# Patient Record
Sex: MALE | Race: WHITE | Employment: FULL TIME | URBAN - METROPOLITAN AREA
[De-identification: names, ages, dates, MRNs, and addresses within clinical notes are randomized per-mention and may not be internally consistent; named-entity substitution may affect disease eponyms.]

---

## 2017-12-08 PROBLEM — G72.0 STATIN MYOPATHY: Status: ACTIVE | Noted: 2017-12-08

## 2017-12-08 PROBLEM — T46.6X5A STATIN MYOPATHY: Status: ACTIVE | Noted: 2017-12-08

## 2018-03-22 RX ORDER — CEFAZOLIN SODIUM/WATER 2 G/20 ML
2 SYRINGE (ML) INTRAVENOUS ONCE
Status: CANCELLED | OUTPATIENT
Start: 2018-03-22 | End: 2018-03-22

## 2018-03-27 ENCOUNTER — HOSPITAL ENCOUNTER (OUTPATIENT)
Dept: SURGERY | Age: 58
Discharge: HOME | End: 2018-03-27
Attending: ORTHOPAEDIC SURGERY
Payer: COMMERCIAL

## 2018-03-27 ENCOUNTER — HOSPITAL ENCOUNTER (OUTPATIENT)
Dept: PHYSICAL THERAPY | Age: 58
Discharge: HOME | End: 2018-03-27
Payer: COMMERCIAL

## 2018-03-27 LAB
ALBUMIN SERPL-MCNC: 3.8 G/DL (ref 3.5–5)
ANION GAP SERPL CALC-SCNC: 12 MMOL/L (ref 7–16)
APPEARANCE UR: CLEAR
APTT PPP: 36.9 SEC (ref 23.2–35.3)
ATRIAL RATE: 61 BPM
BACTERIA SPEC CULT: NORMAL
BASOPHILS # BLD: 0.1 K/UL (ref 0–0.2)
BASOPHILS NFR BLD: 1 % (ref 0–2)
BILIRUB UR QL: NEGATIVE
BUN SERPL-MCNC: 19 MG/DL (ref 6–23)
CALCIUM SERPL-MCNC: 8.8 MG/DL (ref 8.3–10.4)
CALCULATED P AXIS, ECG09: 57 DEGREES
CALCULATED R AXIS, ECG10: 59 DEGREES
CALCULATED T AXIS, ECG11: 55 DEGREES
CHLORIDE SERPL-SCNC: 105 MMOL/L (ref 98–107)
CO2 SERPL-SCNC: 23 MMOL/L (ref 21–32)
COLOR UR: YELLOW
CREAT SERPL-MCNC: 1.04 MG/DL (ref 0.8–1.5)
DIAGNOSIS, 93000: NORMAL
DIFFERENTIAL METHOD BLD: ABNORMAL
EOSINOPHIL # BLD: 0.3 K/UL (ref 0–0.8)
EOSINOPHIL NFR BLD: 5 % (ref 0.5–7.8)
ERYTHROCYTE [DISTWIDTH] IN BLOOD BY AUTOMATED COUNT: 14.5 % (ref 11.9–14.6)
EST. AVERAGE GLUCOSE BLD GHB EST-MCNC: 111 MG/DL
GLUCOSE SERPL-MCNC: 180 MG/DL (ref 65–100)
GLUCOSE UR STRIP.AUTO-MCNC: NEGATIVE MG/DL
HBA1C MFR BLD: 5.5 % (ref 4.8–6)
HCT VFR BLD AUTO: 48 % (ref 41.1–50.3)
HGB BLD-MCNC: 16.2 G/DL (ref 13.6–17.2)
HGB UR QL STRIP: NEGATIVE
IMM GRANULOCYTES # BLD: 0 K/UL (ref 0–0.5)
IMM GRANULOCYTES NFR BLD AUTO: 0 % (ref 0–5)
INR PPP: 1
KETONES UR QL STRIP.AUTO: NEGATIVE MG/DL
LEUKOCYTE ESTERASE UR QL STRIP.AUTO: NEGATIVE
LYMPHOCYTES # BLD: 1.3 K/UL (ref 0.5–4.6)
LYMPHOCYTES NFR BLD: 20 % (ref 13–44)
MCH RBC QN AUTO: 31.7 PG (ref 26.1–32.9)
MCHC RBC AUTO-ENTMCNC: 33.8 G/DL (ref 31.4–35)
MCV RBC AUTO: 93.9 FL (ref 79.6–97.8)
MONOCYTES # BLD: 0.3 K/UL (ref 0.1–1.3)
MONOCYTES NFR BLD: 5 % (ref 4–12)
NEUTS SEG # BLD: 4.3 K/UL (ref 1.7–8.2)
NEUTS SEG NFR BLD: 69 % (ref 43–78)
NITRITE UR QL STRIP.AUTO: NEGATIVE
P-R INTERVAL, ECG05: 162 MS
PH UR STRIP: 5.5 [PH] (ref 5–9)
PLATELET # BLD AUTO: 198 K/UL (ref 150–450)
PMV BLD AUTO: 10.7 FL (ref 10.8–14.1)
POTASSIUM SERPL-SCNC: 4.1 MMOL/L (ref 3.5–5.1)
PROT UR STRIP-MCNC: NEGATIVE MG/DL
PROTHROMBIN TIME: 12.9 SEC (ref 11.5–14.5)
Q-T INTERVAL, ECG07: 412 MS
QRS DURATION, ECG06: 90 MS
QTC CALCULATION (BEZET), ECG08: 414 MS
RBC # BLD AUTO: 5.11 M/UL (ref 4.23–5.67)
SERVICE CMNT-IMP: NORMAL
SODIUM SERPL-SCNC: 140 MMOL/L (ref 136–145)
SP GR UR REFRACTOMETRY: 1.02 (ref 1–1.02)
UROBILINOGEN UR QL STRIP.AUTO: 0.2 EU/DL (ref 0.2–1)
VENTRICULAR RATE, ECG03: 61 BPM
WBC # BLD AUTO: 6.3 K/UL (ref 4.3–11.1)

## 2018-03-27 PROCEDURE — 80307 DRUG TEST PRSMV CHEM ANLYZR: CPT | Performed by: ORTHOPAEDIC SURGERY

## 2018-03-27 PROCEDURE — 77030027138 HC INCENT SPIROMETER -A

## 2018-03-27 PROCEDURE — 82040 ASSAY OF SERUM ALBUMIN: CPT | Performed by: ORTHOPAEDIC SURGERY

## 2018-03-27 PROCEDURE — 87641 MR-STAPH DNA AMP PROBE: CPT | Performed by: ORTHOPAEDIC SURGERY

## 2018-03-27 PROCEDURE — 93005 ELECTROCARDIOGRAM TRACING: CPT | Performed by: ORTHOPAEDIC SURGERY

## 2018-03-27 PROCEDURE — 81003 URINALYSIS AUTO W/O SCOPE: CPT | Performed by: ORTHOPAEDIC SURGERY

## 2018-03-27 PROCEDURE — 36415 COLL VENOUS BLD VENIPUNCTURE: CPT | Performed by: ORTHOPAEDIC SURGERY

## 2018-03-27 PROCEDURE — 85730 THROMBOPLASTIN TIME PARTIAL: CPT | Performed by: ORTHOPAEDIC SURGERY

## 2018-03-27 PROCEDURE — 85025 COMPLETE CBC W/AUTO DIFF WBC: CPT | Performed by: ORTHOPAEDIC SURGERY

## 2018-03-27 PROCEDURE — 97161 PT EVAL LOW COMPLEX 20 MIN: CPT

## 2018-03-27 PROCEDURE — 80048 BASIC METABOLIC PNL TOTAL CA: CPT | Performed by: ORTHOPAEDIC SURGERY

## 2018-03-27 PROCEDURE — 83036 HEMOGLOBIN GLYCOSYLATED A1C: CPT | Performed by: ORTHOPAEDIC SURGERY

## 2018-03-27 PROCEDURE — 85610 PROTHROMBIN TIME: CPT | Performed by: ORTHOPAEDIC SURGERY

## 2018-03-27 NOTE — PERIOP NOTES
Patient verified name, , and surgery as listed in [facility]. Type 3 surgery, PAT joint assessment complete. Labs per surgeon: cbc, bmp, UA, PT, PTT, albumin, nicotine, HgbA1C, MRSA nasal swab; results pending. Labs per anesthesia protocol: no additional labs needed. EKG:Done today- within anesthesia guidelines. Requested Echo report and carotid US report to be faxed to [phone] from Dr. Maggi Mckinley office. Hibiclens and instructions to return bottle on DOS given per hospital policy. Nasal Swab collected per MD order and instructions for Mupirocin nasal ointment if required. Patient provided with handouts including Guide to Surgery, Pain Management, Hand Hygiene, Blood Transfusion Education, and [place] [facility] Brochure. Patient answered medical/surgical history questions at their best of ability. All prior to admission medications documented in [facility]. Original medication prescription bottle not visualized during patient appointment. Patient instructed to hold all vitamins 7 days prior to surgery and NSAIDS 5 days prior to surgery. Medications to be held: mobic. Patient instructed to continue previous medications as prescribed prior to surgery and to take the following medications the day of surgery according to anesthesia guidelines with a small sip of water: atenolol, crestor and norvasc. Patient teach back successful and patient demonstrates knowledge of instruction.

## 2018-03-27 NOTE — PERIOP NOTES
PTT elevated and glucose 180- pt is not a known diabetic. HgbA1C is normal. Will have anesthesia review abnormal labs results. All other lab results within anesthesia guidelines. Lab results sent to PCP per surgeon's request.     Recent Results (from the past 12 hour(s))   CBC WITH AUTOMATED DIFF    Collection Time: 03/27/18  7:40 AM   Result Value Ref Range    WBC 6.3 4.3 - 11.1 K/uL    RBC 5.11 4.23 - 5.67 M/uL    HGB 16.2 13.6 - 17.2 g/dL    HCT 48.0 41.1 - 50.3 %    MCV 93.9 79.6 - 97.8 FL    MCH 31.7 26.1 - 32.9 PG    MCHC 33.8 31.4 - 35.0 g/dL    RDW 14.5 11.9 - 14.6 %    PLATELET 093 414 - 979 K/uL    MPV 10.7 (L) 10.8 - 14.1 FL    DF AUTOMATED      NEUTROPHILS 69 43 - 78 %    LYMPHOCYTES 20 13 - 44 %    MONOCYTES 5 4.0 - 12.0 %    EOSINOPHILS 5 0.5 - 7.8 %    BASOPHILS 1 0.0 - 2.0 %    IMMATURE GRANULOCYTES 0 0.0 - 5.0 %    ABS. NEUTROPHILS 4.3 1.7 - 8.2 K/UL    ABS. LYMPHOCYTES 1.3 0.5 - 4.6 K/UL    ABS. MONOCYTES 0.3 0.1 - 1.3 K/UL    ABS. EOSINOPHILS 0.3 0.0 - 0.8 K/UL    ABS. BASOPHILS 0.1 0.0 - 0.2 K/UL    ABS. IMM.  GRANS. 0.0 0.0 - 0.5 K/UL   PROTHROMBIN TIME + INR    Collection Time: 03/27/18  7:40 AM   Result Value Ref Range    Prothrombin time 12.9 11.5 - 14.5 sec    INR 1.0     PTT    Collection Time: 03/27/18  7:40 AM   Result Value Ref Range    aPTT 36.9 (H) 23.2 - 29.4 SEC   METABOLIC PANEL, BASIC    Collection Time: 03/27/18  7:40 AM   Result Value Ref Range    Sodium 140 136 - 145 mmol/L    Potassium 4.1 3.5 - 5.1 mmol/L    Chloride 105 98 - 107 mmol/L    CO2 23 21 - 32 mmol/L    Anion gap 12 7 - 16 mmol/L    Glucose 180 (H) 65 - 100 mg/dL    BUN 19 6 - 23 MG/DL    Creatinine 1.04 0.8 - 1.5 MG/DL    GFR est AA >60 >60 ml/min/1.73m2    GFR est non-AA >60 >60 ml/min/1.73m2    Calcium 8.8 8.3 - 10.4 MG/DL   URINALYSIS W/ RFLX MICROSCOPIC    Collection Time: 03/27/18  7:40 AM   Result Value Ref Range    Color YELLOW      Appearance CLEAR      Specific gravity 1.020 1.001 - 1.023      pH (UA) 5.5 5.0 - 9.0      Protein NEGATIVE  NEG mg/dL    Glucose NEGATIVE  mg/dL    Ketone NEGATIVE  NEG mg/dL    Bilirubin NEGATIVE  NEG      Blood NEGATIVE  NEG      Urobilinogen 0.2 0.2 - 1.0 EU/dL    Nitrites NEGATIVE  NEG      Leukocyte Esterase NEGATIVE  NEG     ALBUMIN    Collection Time: 03/27/18  7:40 AM   Result Value Ref Range    Albumin 3.8 3.5 - 5.0 g/dL   HEMOGLOBIN A1C WITH EAG    Collection Time: 03/27/18  7:40 AM   Result Value Ref Range    Hemoglobin A1c 5.5 4.8 - 6.0 %    Est. average glucose 111 mg/dL   EKG, 12 LEAD, INITIAL    Collection Time: 03/27/18  8:35 AM   Result Value Ref Range    Ventricular Rate 61 BPM    Atrial Rate 61 BPM    P-R Interval 162 ms    QRS Duration 90 ms    Q-T Interval 412 ms    QTC Calculation (Bezet) 414 ms    Calculated P Axis 57 degrees    Calculated R Axis 59 degrees    Calculated T Axis 55 degrees    Diagnosis       Normal sinus rhythm  Normal ECG  When compared with ECG of 16-SEP-2002 20:43,  QT has shortened  Confirmed by Rosy Finney MD (), COTY RODRIGUES ([id number]) on 3/27/2018 10:21:13 AM

## 2018-03-27 NOTE — PERIOP NOTES
Received recent echo report and carotid US report- both placed on chart for anesthesia reference if needed.

## 2018-03-27 NOTE — PROGRESS NOTES
Miky Bhtaia  : (29 y.o.) [facility] at [address], [place] [address].  [phone]       Physical Therapy Prehab Plan of Treatment and Evaluation Summary:3/27/2018    ICD-10: Treatment Diagnosis:   · Pain in Right Knee (M25.561)  · Stiffness of Right Knee, Not elsewhere classified (M25.661)  Precautions/Allergies:   Ambien [zolpidem]; Celebrex [celecoxib]; Rozerem [ramelteon]; and Sulfa (sulfonamide antibiotics)  MEDICAL/REFERRING DIAGNOSIS:  Unilateral primary osteoarthritis, right knee [M17.11]  REFERRING PHYSICIAN: India Odell MD  DATE OF SURGERY: 3/26/18   Assessment:   Comments:  He is motivated and prepared for surgery. He [place] going home at [place] with his son's support     PROBLEM LIST (Impacting functional limitations):  Mr. Bhatia presents with the following right lower extremity(s) problems:  1. Strength  2. Range of Motion  3. Home Exercise Program  4. Pain   INTERVENTIONS PLANNED:  1. Home Exercise Program  2. Educational Discussion     TREATMENT PLAN: Effective Dates: 3/27/2018 TO 3/27/2018. Frequency/Duration: Patient to continue to perform home exercise program at least twice per day up until his surgery. GOALS: (Goals have been discussed and agreed upon with patient.)  Discharge Goals: Time Frame: 1 Day  1. Patient will demonstrate independence with a home exercise program designed to increase strength, range of motion and pain control to minimize functional deficits and optimize patient for total joint replacement. Rehabilitation Potential For Stated Goals: Good  Regarding Miky Balwinder Powell's therapy, I certify that the treatment plan above will be carried out by a therapist or under their direction.   Thank you for this referral,  Robin Martinze, PT               HISTORY:   Present Symptoms:  Pain Intensity 1: 8 (at its worst)  Pain Location 1: Knee  Pain Orientation 1: Anterior, Right, Medial   History of Present Injury/Illness (Reason for Referral):  Medical/Referring Diagnosis: Unilateral primary osteoarthritis, right knee [M17.11]   Past Medical History/Comorbidities:   Mr. Swetha Antoine  has a past medical history of Alcohol dependence, binge pattern ([facility] [address] (9/16/2002); Back pain (4/13/10); CAD (coronary artery disease) (4/30/10); Chest pain (12/16/13); Cocaine abuse (1995); Degenerative arthritis of knee (9/10/09); Frontal sinusitis (6/29/10); Hyperlipidemia (3/28/14); Knee effusion (6/23/09); Knee pain (6/23/09); Tobacco use (6/29/10); Unspecified essential hypertension (4/11/14); Marquette Glance (04/11/14); and Viral warts, unspecified (4/18/14). Mr. Swetha Antoine  has a past surgical history that includes hx appendectomy (1982) and hx heent. Social History/Living Environment:   Home Environment: Private residence  # Steps to Enter: 3  Rails to Enter: No  One/Two Story Residence: One story  Living Alone: Yes  Support Systems: Child(anila)  Patient Expects to be Discharged to[de-identified] Private residence  Current DME Used/Available at Home: Sparkle Edwards, [address] [facility], [address] [facility] [address] chair  Tub or Shower Type: Shower  Work/Activity:  Works as a . Is on his feet for 10 hrs.   Dominant Side:  RIGHT  Current Medications:  See Pre-assessment nursing note   Number of Personal Factors/Comorbidities that affect the Plan of Care: 1-2: MODERATE COMPLEXITY   EXAMINATION:   ADLs (Current Functional Status):   Ambulation:  [x] Independent  [] Walk Indoors Only  [x] Walk Outdoors  [] Use Assistive Device  [] Use Wheelchair Only Dressing:  [x] Independent  Requires Assistance from Someone for:  [] Sock/Shoes  [] Pants  [] Everything   Bathing/Showering:   [x] Independent  [] Requires Assistance from Someone  [] Sponge Bath Only Household Activities:  [] Routine house and yard work  [] Light Housework Only  [x] None   Observation/Orthostatic Postural Assessment:    Genu varus right, Genu varus left, Leg length discrepancy, right (R LE 1/2\" shorter than L)  ROM/Flexibility:   AROM: Within functional limits (L LE)                       RLE AROM  R Knee Flexion: 85  R Knee Extension: 15   Strength:   Strength: Within functional limits (L LE)              RLE Strength  R Hip Flexion: 3+  R Knee Extension: 3+  R Ankle Dorsiflexion: 4   Functional Mobility:    Sensation: Intact (L LE)    Stand to Sit: Independent  Sit to Stand: Independent  Stand Pivot Transfers: Independent  Distance (ft): 1000 Feet (ft)  Ambulation - Level of Assistance: Independent  Speed/Mckenzie: Pace decreased (<100 feet/min)  Gait Abnormalities: Antalgic          Balance:    Sitting: Intact  Standing: Intact   Body Structures Involved:  1. Bones  2. Joints  3. Muscles Body Functions Affected:  1. Neuromusculoskeletal  2. Movement Related Activities and Participation Affected:  1. Mobility   Number of elements that affect the Plan of Care: 4+: HIGH COMPLEXITY   CLINICAL PRESENTATION:   Presentation: Stable and uncomplicated: LOW COMPLEXITY   CLINICAL DECISION MAKING:   Outcome Measure: Tool Used: Lower Extremity Functional Scale (LEFS)  Score:  Initial: 25/80 Most Recent: X/80 (Date: -- )   Interpretation of Score: 20 questions each scored on a 5 point scale with 0 representing \"extreme difficulty or unable to perform\" and 4 representing \"no difficulty\". The lower the score, the greater the functional disability. 80/80 represents no disability. Minimal detectable change is 9 points. Score 80 79-65 64-49 48-33 32-17 16-1 0   Modifier CH CI CJ CK CL CM CN     ? Mobility - Walking and Moving Around:     - CURRENT STATUS: CL - 60%-79% impaired, limited or restricted    - GOAL STATUS: CL - 60%-79% impaired, limited or restricted    - D/C STATUS:  CL - 60%-79% impaired, limited or restricted  Medical Necessity:   · Mr. Bianca Dickson is expected to optimize his lower extremity strength and ROM in preparation for joint replacement surgery.   Reason for Services/Other Comments:  · Achieve baseline assesment of musculoskeletal system, functional mobility and home environment. , educate in PT HEP in preparation for surgery, educate in hospital plan of care. Use of outcome tool(s) and clinical judgement create a POC that gives a: Clear prediction of patient's progress: LOW COMPLEXITY   TREATMENT:   Treatment/Session Assessment:  Patient was instructed in PT- HEP to increase strength and ROM in LEs. Answered all questions. · Post session pain:  2  · Compliance with Program/Exercises: compliant all of the time.   Total Treatment Duration:  PT Patient Time In/Time Out  Time In: 0715  Time Out: [address]10 [facility]
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
## 2018-03-28 VITALS
TEMPERATURE: 96.6 F | BODY MASS INDEX: 31.82 KG/M2 | WEIGHT: 247.9 LBS | HEIGHT: 74 IN | HEART RATE: 65 BPM | DIASTOLIC BLOOD PRESSURE: 82 MMHG | SYSTOLIC BLOOD PRESSURE: 151 MMHG | RESPIRATION RATE: 16 BRPM | OXYGEN SATURATION: 97 %

## 2018-03-28 LAB
COTININE UR QL SCN: NEGATIVE NG/ML
DRUG SCREEN COMMENT:, 753798: NORMAL

## 2018-03-29 NOTE — PROGRESS NOTES
18 0730   Oxygen Therapy   O2 Sat (%) 97 %   Pulse via Oximetry 71 beats per minute   O2 Device Room air   Pre-Treatment   Breath Sounds Bilateral Clear   Pre FEV1 (liters) 3.1 liters   % Predicted 74   Incentive Spirometry Treatment   Actual Volume (ml) 2750 ml     Initial respiratory Assessment completed with pt. Pt was interviewed and evaluated in [facility] prior to surgery. Patient ID:  Liz Munson  [id number]  45 y.o.  1960  Surgeon: Dr. Nik Coles  Date of Surgery: 2018  Procedure: Total Right Knee Arthroplasty  Primary Care Physician: Julius Chen -[phone]  Specialists:                                  Pt instructed in the use of Incentive Spirometry. Pt instructed to bring Incentive Spirometer back on date of surgery & to start using Is upon return to pt room. Pt taught proper cough technique    History of smokin/2 PPD FOR 40 YEARS                                   FORMER                          Quit date: 2015           Secondhand smoke:[employer]      Past procedures with Oxygen desaturation:NONE    Past Medical History:   Diagnosis Date    Alcohol dependence, binge pattern ([address] 2002    stopped 04    Arthritis     Back pain 4/13/10    CAD (coronary artery disease) 2010    Echo done 2018- LVEF > 65%.      Chest pain 13    Cocaine abuse     Degenerative arthritis of knee 9/10/09    Frontal sinusitis 6/29/10    Hyperlipidemia 2014    Controlled with meds     Knee effusion 09    Knee pain 09    Tobacco use 6/29/10    Unspecified essential hypertension 2014    Controlled with meds     Verruga peruana 14    Viral warts, unspecified 14    filliform/plantar/common/vir              HX OF COCAINE ABUSE AND ALCOHOL DEPENDENCY Respiratory history                                  SOB  ON EXERTION                                    Respiratory meds:                                         FAMILY PRESENT:                                                            NO                                        PAST SLEEP STUDY:                       NO  HX OF TELMA:                                       NO                                     TELMA assessment:                                               SLEEPS ON SIDE                                                               PHYSICAL EXAM   Body mass index is 31.83 kg/(m^2). Visit Vitals    /82 (BP 1 Location: Right arm, BP Patient Position: Sitting; At rest)    Pulse 65    Temp 96.6 °F (35.9 °C)    Resp 16    Ht 6' 2\" (1.88 m)    Wt 112.4 kg (247 lb 14.4 oz)    SpO2 97%    BMI 31.83 kg/m2     Neck circumference: 45     cm    Loud snoring:     STATES HE DOESN'T KNOW                             Witnessed apnea or wakening gasping or choking:,             DENIES,                                                                                                  Awakens with headaches:                                                  DENIES    Morning or daytime tiredness/ sleepiness:                    DENIES - NAPS 2 HOURS A T A TIME A COUPLE OF TIMES PER WEEK                                                                                       Dry mouth or sore throat in morning:                                                                                      DENIES    Scott stage:  4    SACS score:24    STOP/BAN                              CPAP:                       NONE                                               CONT SAT HS              Referrals:    Pt. Phone Number:

## 2018-03-30 NOTE — PERIOP NOTES
Nicotine level- Neg    3/28/2018  8:39 AM - Avinash, Lab In [place]   Component Results   Component Value Flag Ref Range Units Status   Cotinine Screen, urine NEGATIVE   Augkgq=329 ng/mL Final   Drug Screen Comment: Comment

## 2018-04-17 ENCOUNTER — ANESTHESIA EVENT (OUTPATIENT)
Dept: SURGERY | Age: 58
DRG: 470 | End: 2018-04-17
Payer: COMMERCIAL

## 2018-04-18 ENCOUNTER — HOSPITAL ENCOUNTER (INPATIENT)
Age: 58
LOS: 2 days | Discharge: HOME HEALTH | DRG: 470 | End: 2018-04-20
Attending: ORTHOPAEDIC SURGERY | Admitting: ORTHOPAEDIC SURGERY
Payer: COMMERCIAL

## 2018-04-18 ENCOUNTER — ANESTHESIA (OUTPATIENT)
Dept: SURGERY | Age: 58
DRG: 470 | End: 2018-04-18
Payer: COMMERCIAL

## 2018-04-18 ENCOUNTER — HOME HEALTH ADMISSION (OUTPATIENT)
Dept: HOME HEALTH SERVICES | Facility: HOME HEALTH | Age: 58
End: 2018-04-18
Payer: COMMERCIAL

## 2018-04-18 DIAGNOSIS — M17.11 PRIMARY OSTEOARTHRITIS OF RIGHT KNEE: Primary | ICD-10-CM

## 2018-04-18 PROBLEM — M17.9 OA (OSTEOARTHRITIS) OF KNEE: Status: ACTIVE | Noted: 2018-04-18

## 2018-04-18 LAB
ABO + RH BLD: NORMAL
BLOOD GROUP ANTIBODIES SERPL: NORMAL
GLUCOSE BLD STRIP.AUTO-MCNC: 104 MG/DL (ref 65–100)
HGB BLD-MCNC: 13.9 G/DL (ref 13.6–17.2)
SPECIMEN EXP DATE BLD: NORMAL

## 2018-04-18 PROCEDURE — 76210000016 HC OR PH I REC 1 TO 1.5 HR: Performed by: ORTHOPAEDIC SURGERY

## 2018-04-18 PROCEDURE — 97161 PT EVAL LOW COMPLEX 20 MIN: CPT

## 2018-04-18 PROCEDURE — 74011000250 HC RX REV CODE- 250

## 2018-04-18 PROCEDURE — 77030012935 HC DRSG AQUACEL BMS -B: Performed by: ORTHOPAEDIC SURGERY

## 2018-04-18 PROCEDURE — 65270000029 HC RM PRIVATE

## 2018-04-18 PROCEDURE — 77030003602 HC NDL NRV BLK BBMI -B: Performed by: ANESTHESIOLOGY

## 2018-04-18 PROCEDURE — 86900 BLOOD TYPING SEROLOGIC ABO: CPT | Performed by: ORTHOPAEDIC SURGERY

## 2018-04-18 PROCEDURE — 76942 ECHO GUIDE FOR BIOPSY: CPT | Performed by: ORTHOPAEDIC SURGERY

## 2018-04-18 PROCEDURE — 76010010054 HC POST OP PAIN BLOCK: Performed by: ORTHOPAEDIC SURGERY

## 2018-04-18 PROCEDURE — 85018 HEMOGLOBIN: CPT | Performed by: ORTHOPAEDIC SURGERY

## 2018-04-18 PROCEDURE — 94760 N-INVAS EAR/PLS OXIMETRY 1: CPT

## 2018-04-18 PROCEDURE — 77030003665 HC NDL SPN BBMI -A: Performed by: ANESTHESIOLOGY

## 2018-04-18 PROCEDURE — 0SRC0JA REPLACEMENT OF RIGHT KNEE JOINT WITH SYNTHETIC SUBSTITUTE, UNCEMENTED, OPEN APPROACH: ICD-10-PCS | Performed by: ORTHOPAEDIC SURGERY

## 2018-04-18 PROCEDURE — 74011250636 HC RX REV CODE- 250/636

## 2018-04-18 PROCEDURE — 74011250636 HC RX REV CODE- 250/636: Performed by: ORTHOPAEDIC SURGERY

## 2018-04-18 PROCEDURE — 77030018836 HC SOL IRR NACL ICUM -A: Performed by: ORTHOPAEDIC SURGERY

## 2018-04-18 PROCEDURE — 82962 GLUCOSE BLOOD TEST: CPT

## 2018-04-18 PROCEDURE — 77030034849: Performed by: ORTHOPAEDIC SURGERY

## 2018-04-18 PROCEDURE — 77030002922 HC SUT FBRWRE ARTH -B: Performed by: ORTHOPAEDIC SURGERY

## 2018-04-18 PROCEDURE — 36415 COLL VENOUS BLD VENIPUNCTURE: CPT | Performed by: ORTHOPAEDIC SURGERY

## 2018-04-18 PROCEDURE — 97165 OT EVAL LOW COMPLEX 30 MIN: CPT

## 2018-04-18 PROCEDURE — 77030007880 HC KT SPN EPDRL BBMI -B: Performed by: ANESTHESIOLOGY

## 2018-04-18 PROCEDURE — 74011250636 HC RX REV CODE- 250/636: Performed by: ANESTHESIOLOGY

## 2018-04-18 PROCEDURE — C1776 JOINT DEVICE (IMPLANTABLE): HCPCS | Performed by: ORTHOPAEDIC SURGERY

## 2018-04-18 PROCEDURE — 77030002933 HC SUT MCRYL J&J -A: Performed by: ORTHOPAEDIC SURGERY

## 2018-04-18 PROCEDURE — 74011000250 HC RX REV CODE- 250: Performed by: ORTHOPAEDIC SURGERY

## 2018-04-18 PROCEDURE — 77030011640 HC PAD GRND REM COVD -A: Performed by: ORTHOPAEDIC SURGERY

## 2018-04-18 PROCEDURE — 74011250637 HC RX REV CODE- 250/637: Performed by: ANESTHESIOLOGY

## 2018-04-18 PROCEDURE — 76010000162 HC OR TIME 1.5 TO 2 HR INTENSV-TIER 1: Performed by: ORTHOPAEDIC SURGERY

## 2018-04-18 PROCEDURE — 74011250637 HC RX REV CODE- 250/637: Performed by: ORTHOPAEDIC SURGERY

## 2018-04-18 PROCEDURE — 76060000034 HC ANESTHESIA 1.5 TO 2 HR: Performed by: ORTHOPAEDIC SURGERY

## 2018-04-18 PROCEDURE — 77030032490 HC SLV COMPR SCD KNE COVD -B

## 2018-04-18 PROCEDURE — 74011000250 HC RX REV CODE- 250: Performed by: ANESTHESIOLOGY

## 2018-04-18 PROCEDURE — 77030013727 HC IRR FAN PULSVC ZIMM -B: Performed by: ORTHOPAEDIC SURGERY

## 2018-04-18 PROCEDURE — 77030027520 HC SEAL BPLR AQMNTYS MEDT -F: Performed by: ORTHOPAEDIC SURGERY

## 2018-04-18 PROCEDURE — 86580 TB INTRADERMAL TEST: CPT | Performed by: ORTHOPAEDIC SURGERY

## 2018-04-18 PROCEDURE — 94762 N-INVAS EAR/PLS OXIMTRY CONT: CPT

## 2018-04-18 PROCEDURE — 77030020782 HC GWN BAIR PAWS FLX 3M -B: Performed by: ANESTHESIOLOGY

## 2018-04-18 PROCEDURE — 77030003666 HC NDL SPINAL BD -A: Performed by: ORTHOPAEDIC SURGERY

## 2018-04-18 PROCEDURE — 77030033067 HC SUT PDO STRATFX SPIR J&J -B: Performed by: ORTHOPAEDIC SURGERY

## 2018-04-18 PROCEDURE — 74011000302 HC RX REV CODE- 302: Performed by: ORTHOPAEDIC SURGERY

## 2018-04-18 DEVICE — PATELLA
Type: IMPLANTABLE DEVICE | Site: KNEE | Status: FUNCTIONAL
Brand: TRIATHLON

## 2018-04-18 DEVICE — TIBIAL COMPONENT
Type: IMPLANTABLE DEVICE | Site: KNEE | Status: FUNCTIONAL
Brand: TRIATHLON

## 2018-04-18 DEVICE — CRUCIATE RETAINING FEMORAL
Type: IMPLANTABLE DEVICE | Site: KNEE | Status: FUNCTIONAL
Brand: TRIATHLON

## 2018-04-18 DEVICE — TIBIAL BEARING INSERT
Type: IMPLANTABLE DEVICE | Site: KNEE | Status: FUNCTIONAL
Brand: TRIATHLON

## 2018-04-18 RX ORDER — CYCLOBENZAPRINE HCL 10 MG
5 TABLET ORAL 3 TIMES DAILY
Status: DISCONTINUED | OUTPATIENT
Start: 2018-04-18 | End: 2018-04-20 | Stop reason: HOSPADM

## 2018-04-18 RX ORDER — ONDANSETRON 8 MG/1
8 TABLET, ORALLY DISINTEGRATING ORAL
Status: DISCONTINUED | OUTPATIENT
Start: 2018-04-18 | End: 2018-04-20 | Stop reason: HOSPADM

## 2018-04-18 RX ORDER — ONDANSETRON 2 MG/ML
INJECTION INTRAMUSCULAR; INTRAVENOUS AS NEEDED
Status: DISCONTINUED | OUTPATIENT
Start: 2018-04-18 | End: 2018-04-18 | Stop reason: HOSPADM

## 2018-04-18 RX ORDER — NALOXONE HYDROCHLORIDE 0.4 MG/ML
.2-.4 INJECTION, SOLUTION INTRAMUSCULAR; INTRAVENOUS; SUBCUTANEOUS
Status: DISCONTINUED | OUTPATIENT
Start: 2018-04-18 | End: 2018-04-20 | Stop reason: HOSPADM

## 2018-04-18 RX ORDER — ATENOLOL 50 MG/1
100 TABLET ORAL DAILY
Status: DISCONTINUED | OUTPATIENT
Start: 2018-04-19 | End: 2018-04-20 | Stop reason: HOSPADM

## 2018-04-18 RX ORDER — ASPIRIN 81 MG/1
81 TABLET ORAL EVERY 12 HOURS
Qty: 60 TAB | Refills: 0 | Status: SHIPPED | OUTPATIENT
Start: 2018-04-18 | End: 2018-12-21

## 2018-04-18 RX ORDER — DEXAMETHASONE SODIUM PHOSPHATE 4 MG/ML
INJECTION, SOLUTION INTRA-ARTICULAR; INTRALESIONAL; INTRAMUSCULAR; INTRAVENOUS; SOFT TISSUE AS NEEDED
Status: DISCONTINUED | OUTPATIENT
Start: 2018-04-18 | End: 2018-04-18 | Stop reason: HOSPADM

## 2018-04-18 RX ORDER — ACETAMINOPHEN 10 MG/ML
1000 INJECTION, SOLUTION INTRAVENOUS ONCE
Status: COMPLETED | OUTPATIENT
Start: 2018-04-18 | End: 2018-04-18

## 2018-04-18 RX ORDER — LIDOCAINE HYDROCHLORIDE 10 MG/ML
0.1 INJECTION INFILTRATION; PERINEURAL AS NEEDED
Status: DISCONTINUED | OUTPATIENT
Start: 2018-04-18 | End: 2018-04-18 | Stop reason: HOSPADM

## 2018-04-18 RX ORDER — SODIUM CHLORIDE, SODIUM LACTATE, POTASSIUM CHLORIDE, CALCIUM CHLORIDE 600; 310; 30; 20 MG/100ML; MG/100ML; MG/100ML; MG/100ML
100 INJECTION, SOLUTION INTRAVENOUS CONTINUOUS
Status: DISCONTINUED | OUTPATIENT
Start: 2018-04-18 | End: 2018-04-18 | Stop reason: HOSPADM

## 2018-04-18 RX ORDER — MIDAZOLAM HYDROCHLORIDE 1 MG/ML
INJECTION, SOLUTION INTRAMUSCULAR; INTRAVENOUS AS NEEDED
Status: DISCONTINUED | OUTPATIENT
Start: 2018-04-18 | End: 2018-04-18 | Stop reason: HOSPADM

## 2018-04-18 RX ORDER — MIDAZOLAM HYDROCHLORIDE 1 MG/ML
2 INJECTION, SOLUTION INTRAMUSCULAR; INTRAVENOUS ONCE
Status: COMPLETED | OUTPATIENT
Start: 2018-04-18 | End: 2018-04-18

## 2018-04-18 RX ORDER — MELOXICAM 7.5 MG/1
7.5 TABLET ORAL DAILY
Status: DISCONTINUED | OUTPATIENT
Start: 2018-04-19 | End: 2018-04-20 | Stop reason: HOSPADM

## 2018-04-18 RX ORDER — GABAPENTIN 100 MG/1
100 CAPSULE ORAL 2 TIMES DAILY
Status: DISCONTINUED | OUTPATIENT
Start: 2018-04-18 | End: 2018-04-20 | Stop reason: HOSPADM

## 2018-04-18 RX ORDER — ASPIRIN 81 MG/1
81 TABLET ORAL EVERY 12 HOURS
Status: DISCONTINUED | OUTPATIENT
Start: 2018-04-18 | End: 2018-04-20 | Stop reason: HOSPADM

## 2018-04-18 RX ORDER — ONDANSETRON 8 MG/1
8 TABLET, ORALLY DISINTEGRATING ORAL
Qty: 60 TAB | Refills: 0 | Status: SHIPPED | OUTPATIENT
Start: 2018-04-18 | End: 2018-06-15

## 2018-04-18 RX ORDER — ROPIVACAINE HYDROCHLORIDE 5 MG/ML
INJECTION, SOLUTION EPIDURAL; INFILTRATION; PERINEURAL AS NEEDED
Status: DISCONTINUED | OUTPATIENT
Start: 2018-04-18 | End: 2018-04-18 | Stop reason: HOSPADM

## 2018-04-18 RX ORDER — ACETAMINOPHEN 500 MG
1000 TABLET ORAL ONCE
Status: DISCONTINUED | OUTPATIENT
Start: 2018-04-18 | End: 2018-04-18 | Stop reason: ALTCHOICE

## 2018-04-18 RX ORDER — KETOROLAC TROMETHAMINE 30 MG/ML
INJECTION, SOLUTION INTRAMUSCULAR; INTRAVENOUS AS NEEDED
Status: DISCONTINUED | OUTPATIENT
Start: 2018-04-18 | End: 2018-04-18 | Stop reason: HOSPADM

## 2018-04-18 RX ORDER — SODIUM CHLORIDE, SODIUM LACTATE, POTASSIUM CHLORIDE, CALCIUM CHLORIDE 600; 310; 30; 20 MG/100ML; MG/100ML; MG/100ML; MG/100ML
100 INJECTION, SOLUTION INTRAVENOUS CONTINUOUS
Status: DISCONTINUED | OUTPATIENT
Start: 2018-04-18 | End: 2018-04-20 | Stop reason: HOSPADM

## 2018-04-18 RX ORDER — OXYCODONE HCL 10 MG/1
10 TABLET, FILM COATED, EXTENDED RELEASE ORAL EVERY 12 HOURS
Status: DISCONTINUED | OUTPATIENT
Start: 2018-04-18 | End: 2018-04-20 | Stop reason: HOSPADM

## 2018-04-18 RX ORDER — FENTANYL CITRATE 50 UG/ML
100 INJECTION, SOLUTION INTRAMUSCULAR; INTRAVENOUS ONCE
Status: COMPLETED | OUTPATIENT
Start: 2018-04-18 | End: 2018-04-18

## 2018-04-18 RX ORDER — PROPOFOL 10 MG/ML
INJECTION, EMULSION INTRAVENOUS
Status: DISCONTINUED | OUTPATIENT
Start: 2018-04-18 | End: 2018-04-18 | Stop reason: HOSPADM

## 2018-04-18 RX ORDER — SODIUM CHLORIDE 9 MG/ML
INJECTION INTRAMUSCULAR; INTRAVENOUS; SUBCUTANEOUS AS NEEDED
Status: DISCONTINUED | OUTPATIENT
Start: 2018-04-18 | End: 2018-04-18 | Stop reason: HOSPADM

## 2018-04-18 RX ORDER — HYDROMORPHONE HYDROCHLORIDE 2 MG/ML
1 INJECTION, SOLUTION INTRAMUSCULAR; INTRAVENOUS; SUBCUTANEOUS
Status: DISCONTINUED | OUTPATIENT
Start: 2018-04-18 | End: 2018-04-20 | Stop reason: HOSPADM

## 2018-04-18 RX ORDER — DIPHENHYDRAMINE HCL 25 MG
25 CAPSULE ORAL
Status: DISCONTINUED | OUTPATIENT
Start: 2018-04-18 | End: 2018-04-20 | Stop reason: HOSPADM

## 2018-04-18 RX ORDER — SODIUM CHLORIDE 9 MG/ML
100 INJECTION, SOLUTION INTRAVENOUS CONTINUOUS
Status: DISPENSED | OUTPATIENT
Start: 2018-04-18 | End: 2018-04-20

## 2018-04-18 RX ORDER — ROSUVASTATIN CALCIUM 5 MG/1
10 TABLET, COATED ORAL
Status: DISCONTINUED | OUTPATIENT
Start: 2018-04-18 | End: 2018-04-20 | Stop reason: HOSPADM

## 2018-04-18 RX ORDER — ATENOLOL 50 MG/1
100 TABLET ORAL ONCE
Status: COMPLETED | OUTPATIENT
Start: 2018-04-18 | End: 2018-04-18

## 2018-04-18 RX ORDER — KETOROLAC TROMETHAMINE 30 MG/ML
15 INJECTION, SOLUTION INTRAMUSCULAR; INTRAVENOUS EVERY 8 HOURS
Status: COMPLETED | OUTPATIENT
Start: 2018-04-18 | End: 2018-04-19

## 2018-04-18 RX ORDER — TRANEXAMIC ACID 650 1/1
1300 TABLET ORAL
Status: COMPLETED | OUTPATIENT
Start: 2018-04-18 | End: 2018-04-18

## 2018-04-18 RX ORDER — NALOXONE HYDROCHLORIDE 0.4 MG/ML
0.04 INJECTION, SOLUTION INTRAMUSCULAR; INTRAVENOUS; SUBCUTANEOUS
Status: DISCONTINUED | OUTPATIENT
Start: 2018-04-18 | End: 2018-04-20 | Stop reason: HOSPADM

## 2018-04-18 RX ORDER — SODIUM CHLORIDE 0.9 % (FLUSH) 0.9 %
5-10 SYRINGE (ML) INJECTION EVERY 8 HOURS
Status: DISCONTINUED | OUTPATIENT
Start: 2018-04-18 | End: 2018-04-20 | Stop reason: HOSPADM

## 2018-04-18 RX ORDER — EPHEDRINE SULFATE 50 MG/ML
INJECTION, SOLUTION INTRAVENOUS AS NEEDED
Status: DISCONTINUED | OUTPATIENT
Start: 2018-04-18 | End: 2018-04-18 | Stop reason: HOSPADM

## 2018-04-18 RX ORDER — GABAPENTIN 100 MG/1
100 CAPSULE ORAL 2 TIMES DAILY
Qty: 60 CAP | Refills: 0 | Status: SHIPPED | OUTPATIENT
Start: 2018-04-18 | End: 2018-12-21

## 2018-04-18 RX ORDER — AMLODIPINE BESYLATE 5 MG/1
5 TABLET ORAL DAILY
Status: DISCONTINUED | OUTPATIENT
Start: 2018-04-19 | End: 2018-04-20 | Stop reason: HOSPADM

## 2018-04-18 RX ORDER — BUPIVACAINE HYDROCHLORIDE 7.5 MG/ML
INJECTION, SOLUTION INTRASPINAL AS NEEDED
Status: DISCONTINUED | OUTPATIENT
Start: 2018-04-18 | End: 2018-04-18 | Stop reason: HOSPADM

## 2018-04-18 RX ORDER — CEFAZOLIN SODIUM/WATER 2 G/20 ML
2 SYRINGE (ML) INTRAVENOUS EVERY 8 HOURS
Status: COMPLETED | OUTPATIENT
Start: 2018-04-18 | End: 2018-04-19

## 2018-04-18 RX ORDER — NEOMYCIN AND POLYMYXIN B SULFATES 40; 200000 MG/ML; [USP'U]/ML
SOLUTION IRRIGATION AS NEEDED
Status: DISCONTINUED | OUTPATIENT
Start: 2018-04-18 | End: 2018-04-18

## 2018-04-18 RX ORDER — HYDROMORPHONE HYDROCHLORIDE 2 MG/ML
0.5 INJECTION, SOLUTION INTRAMUSCULAR; INTRAVENOUS; SUBCUTANEOUS
Status: DISCONTINUED | OUTPATIENT
Start: 2018-04-18 | End: 2018-04-18 | Stop reason: ALTCHOICE

## 2018-04-18 RX ORDER — CEFAZOLIN SODIUM/WATER 2 G/20 ML
2 SYRINGE (ML) INTRAVENOUS ONCE
Status: COMPLETED | OUTPATIENT
Start: 2018-04-18 | End: 2018-04-18

## 2018-04-18 RX ORDER — MIDAZOLAM HYDROCHLORIDE 1 MG/ML
2 INJECTION, SOLUTION INTRAMUSCULAR; INTRAVENOUS
Status: DISCONTINUED | OUTPATIENT
Start: 2018-04-18 | End: 2018-04-18 | Stop reason: HOSPADM

## 2018-04-18 RX ORDER — CYCLOBENZAPRINE HCL 10 MG
5 TABLET ORAL 3 TIMES DAILY
Qty: 60 TAB | Refills: 0 | Status: SHIPPED | OUTPATIENT
Start: 2018-04-18 | End: 2018-06-15

## 2018-04-18 RX ORDER — DEXAMETHASONE SODIUM PHOSPHATE 100 MG/10ML
10 INJECTION INTRAMUSCULAR; INTRAVENOUS ONCE
Status: ACTIVE | OUTPATIENT
Start: 2018-04-19 | End: 2018-04-20

## 2018-04-18 RX ORDER — HYDROMORPHONE HYDROCHLORIDE 2 MG/1
2 TABLET ORAL
Qty: 60 TAB | Refills: 0 | Status: SHIPPED | OUTPATIENT
Start: 2018-04-18 | End: 2018-06-15

## 2018-04-18 RX ORDER — TRANEXAMIC ACID 100 MG/ML
INJECTION, SOLUTION INTRAVENOUS AS NEEDED
Status: DISCONTINUED | OUTPATIENT
Start: 2018-04-18 | End: 2018-04-18 | Stop reason: HOSPADM

## 2018-04-18 RX ORDER — ACETAMINOPHEN 500 MG
1000 TABLET ORAL EVERY 6 HOURS
Qty: 120 TAB | Refills: 0 | Status: SHIPPED | OUTPATIENT
Start: 2018-04-19

## 2018-04-18 RX ORDER — SODIUM CHLORIDE 0.9 % (FLUSH) 0.9 %
5-10 SYRINGE (ML) INJECTION AS NEEDED
Status: DISCONTINUED | OUTPATIENT
Start: 2018-04-18 | End: 2018-04-20 | Stop reason: HOSPADM

## 2018-04-18 RX ORDER — AMOXICILLIN 250 MG
2 CAPSULE ORAL DAILY
Status: DISCONTINUED | OUTPATIENT
Start: 2018-04-19 | End: 2018-04-20 | Stop reason: HOSPADM

## 2018-04-18 RX ORDER — ZOLPIDEM TARTRATE 5 MG/1
5 TABLET ORAL
Status: DISCONTINUED | OUTPATIENT
Start: 2018-04-18 | End: 2018-04-20 | Stop reason: HOSPADM

## 2018-04-18 RX ORDER — AMOXICILLIN 250 MG
2 CAPSULE ORAL DAILY
Qty: 120 TAB | Refills: 0 | Status: SHIPPED | OUTPATIENT
Start: 2018-04-19 | End: 2018-06-15

## 2018-04-18 RX ORDER — HYDROMORPHONE HYDROCHLORIDE 2 MG/1
2 TABLET ORAL
Status: DISCONTINUED | OUTPATIENT
Start: 2018-04-18 | End: 2018-04-20 | Stop reason: HOSPADM

## 2018-04-18 RX ORDER — OXYCODONE HYDROCHLORIDE 5 MG/1
5 TABLET ORAL
Status: DISCONTINUED | OUTPATIENT
Start: 2018-04-18 | End: 2018-04-18 | Stop reason: ALTCHOICE

## 2018-04-18 RX ORDER — ACETAMINOPHEN 500 MG
1000 TABLET ORAL EVERY 6 HOURS
Status: DISCONTINUED | OUTPATIENT
Start: 2018-04-19 | End: 2018-04-20 | Stop reason: HOSPADM

## 2018-04-18 RX ADMIN — DEXAMETHASONE SODIUM PHOSPHATE 10 MG: 4 INJECTION, SOLUTION INTRA-ARTICULAR; INTRALESIONAL; INTRAMUSCULAR; INTRAVENOUS; SOFT TISSUE at 09:52

## 2018-04-18 RX ADMIN — TRANEXAMIC ACID 650 MG: 650 TABLET, FILM COATED ORAL at 15:37

## 2018-04-18 RX ADMIN — LIDOCAINE HYDROCHLORIDE 0.1 ML: 10 INJECTION, SOLUTION INFILTRATION; PERINEURAL at 06:45

## 2018-04-18 RX ADMIN — CYCLOBENZAPRINE HYDROCHLORIDE 5 MG: 10 TABLET, FILM COATED ORAL at 22:53

## 2018-04-18 RX ADMIN — SODIUM CHLORIDE, SODIUM LACTATE, POTASSIUM CHLORIDE, AND CALCIUM CHLORIDE: 600; 310; 30; 20 INJECTION, SOLUTION INTRAVENOUS at 09:21

## 2018-04-18 RX ADMIN — SODIUM CHLORIDE, SODIUM LACTATE, POTASSIUM CHLORIDE, AND CALCIUM CHLORIDE: 600; 310; 30; 20 INJECTION, SOLUTION INTRAVENOUS at 09:42

## 2018-04-18 RX ADMIN — PROPOFOL: 10 INJECTION, EMULSION INTRAVENOUS at 10:46

## 2018-04-18 RX ADMIN — HYDROMORPHONE HYDROCHLORIDE 2 MG: 2 TABLET ORAL at 14:25

## 2018-04-18 RX ADMIN — TRANEXAMIC ACID 1300 MG: 650 TABLET, FILM COATED ORAL at 21:27

## 2018-04-18 RX ADMIN — FENTANYL CITRATE 100 MCG: 50 INJECTION INTRAMUSCULAR; INTRAVENOUS at 08:16

## 2018-04-18 RX ADMIN — Medication 2 G: at 09:29

## 2018-04-18 RX ADMIN — PROPOFOL: 10 INJECTION, EMULSION INTRAVENOUS at 10:11

## 2018-04-18 RX ADMIN — ATENOLOL 100 MG: 50 TABLET ORAL at 07:26

## 2018-04-18 RX ADMIN — ASPIRIN 81 MG: 81 TABLET, COATED ORAL at 21:27

## 2018-04-18 RX ADMIN — SODIUM CHLORIDE, SODIUM LACTATE, POTASSIUM CHLORIDE, AND CALCIUM CHLORIDE 100 ML/HR: 600; 310; 30; 20 INJECTION, SOLUTION INTRAVENOUS at 06:46

## 2018-04-18 RX ADMIN — SODIUM CHLORIDE 100 ML/HR: 900 INJECTION, SOLUTION INTRAVENOUS at 22:59

## 2018-04-18 RX ADMIN — ONDANSETRON 4 MG: 2 INJECTION INTRAMUSCULAR; INTRAVENOUS at 09:52

## 2018-04-18 RX ADMIN — EPHEDRINE SULFATE 10 MG: 50 INJECTION, SOLUTION INTRAVENOUS at 09:58

## 2018-04-18 RX ADMIN — KETOROLAC TROMETHAMINE: 30 INJECTION, SOLUTION INTRAMUSCULAR at 14:25

## 2018-04-18 RX ADMIN — GABAPENTIN 100 MG: 100 CAPSULE ORAL at 17:55

## 2018-04-18 RX ADMIN — HYDROMORPHONE HYDROCHLORIDE 1 MG: 2 INJECTION, SOLUTION INTRAMUSCULAR; INTRAVENOUS; SUBCUTANEOUS at 15:37

## 2018-04-18 RX ADMIN — HYDROMORPHONE HYDROCHLORIDE 2 MG: 2 TABLET ORAL at 22:57

## 2018-04-18 RX ADMIN — TRANEXAMIC ACID 1000 MG: 100 INJECTION, SOLUTION INTRAVENOUS at 09:40

## 2018-04-18 RX ADMIN — MIDAZOLAM HYDROCHLORIDE 2 MG: 1 INJECTION, SOLUTION INTRAMUSCULAR; INTRAVENOUS at 08:16

## 2018-04-18 RX ADMIN — ACETAMINOPHEN 1000 MG: 10 INJECTION, SOLUTION INTRAVENOUS at 17:56

## 2018-04-18 RX ADMIN — EPHEDRINE SULFATE 10 MG: 50 INJECTION, SOLUTION INTRAVENOUS at 10:16

## 2018-04-18 RX ADMIN — KETOROLAC TROMETHAMINE 15 MG: 30 INJECTION, SOLUTION INTRAMUSCULAR at 21:27

## 2018-04-18 RX ADMIN — OXYCODONE HYDROCHLORIDE 10 MG: 10 TABLET, FILM COATED, EXTENDED RELEASE ORAL at 21:28

## 2018-04-18 RX ADMIN — PROPOFOL 50 MCG/KG/MIN: 10 INJECTION, EMULSION INTRAVENOUS at 09:36

## 2018-04-18 RX ADMIN — CYCLOBENZAPRINE HYDROCHLORIDE 5 MG: 10 TABLET, FILM COATED ORAL at 14:25

## 2018-04-18 RX ADMIN — Medication 2 G: at 15:38

## 2018-04-18 RX ADMIN — SODIUM CHLORIDE 100 ML/HR: 900 INJECTION, SOLUTION INTRAVENOUS at 14:25

## 2018-04-18 RX ADMIN — BUPIVACAINE HYDROCHLORIDE 2 ML: 7.5 INJECTION, SOLUTION INTRASPINAL at 09:28

## 2018-04-18 RX ADMIN — MIDAZOLAM HYDROCHLORIDE 2 MG: 1 INJECTION, SOLUTION INTRAMUSCULAR; INTRAVENOUS at 09:26

## 2018-04-18 RX ADMIN — ROSUVASTATIN CALCIUM 10 MG: 5 TABLET, FILM COATED ORAL at 22:54

## 2018-04-18 RX ADMIN — TUBERCULIN PURIFIED PROTEIN DERIVATIVE 5 UNITS: 5 INJECTION, SOLUTION INTRADERMAL at 06:45

## 2018-04-18 RX ADMIN — TRANEXAMIC ACID 1000 MG: 100 INJECTION, SOLUTION INTRAVENOUS at 10:48

## 2018-04-18 NOTE — PERIOP NOTES
TRANSFER - OUT REPORT:    Verbal report given to [facility] RN on Hue Powell  being transferred to [facility] [place] room 336 for routine progression of care       Report consisted of patients Situation, Background, Assessment and   Recommendations(SBAR). Information from the following report(s) SBAR, Intake/Output and MAR was reviewed with the receiving nurse. Opportunity for questions and clarification was provided.       Patient transported with:   O2 @ 2 liters  Tech

## 2018-04-18 NOTE — PROGRESS NOTES
04/18/18 1432   Oxygen Therapy   O2 Sat (%) 96 %   Pulse via Oximetry 84 beats per minute   O2 Device Nasal cannula   O2 Flow Rate (L/min) 1.5 l/min   Patient achieved   3000  Ml/sec on IS. Patient encouraged to do 10 breaths every hour while awake-patient agreed and demonstrated. No shortness of breath or distress noted. BS are clear b/l. [facility] notes reviewed- Sat monitor #7 placed at pt's bedside.

## 2018-04-18 NOTE — PROGRESS NOTES
Admission Assessment Complete. Pt had a RTKA today Pt is A&Ox 3.  +2 pedal pulses BLE pharmacologcially paralzyed. Dressing is clean, dry and intact. IVF inusing. Dempsey is draining straw yellow urine. Pt denies any pain or need at this time. Bed low and locked. Side rails x3. Call light with in reach. Pt verbalizes understanding of call light.

## 2018-04-18 NOTE — OP NOTES
[address] [facility]  Total Knee Arthroplasty  Patient:Hayden Alonzo   : 1960  Medical Record RFAHOT:[id number]    Pre-operative Diagnosis:  Primary osteoarthritis of right knee [M17.11]    Post-operative Diagnosis: Primary osteoarthritis of right knee [M17.11]    Location: [place] [address]    Date of Procedure: 2018    Surgeon: Tonya Ramachandran MD    Assistant: NONE    Anesthesia: Spinal and adductor nerve block    Procedure:  Right Total Knee Arthroplasty    Tourniquet Time: 0 minutes    EBL: 445IT    Complications: None    Patient Condition upon Completion of Procedure: Stable    Implants:   Implant Name Type Inv. Item Serial No.  Lot No. LRB No. Used   PAT ASYM MTL-BK 11MM SZ A40 -- TRIATHLON - [id number]  PAT ASYM MTL-BK 11MM SZ A40 -- TRIATHLON DM2H [employer] HOW DM2H Right 1   COMPNT FEM CR TRIATHLN 7 R PA --  - [id number]  COMPNT FEM CR TRIATHLN 7 R PA --  DEB2N [employer] HOW DEB2N Right 1   BASEPLT TIB PC TRITNM SZ 8 -- TRIATHLON - [id number]  BASEPLT TIB PC TRITNM SZ 8 -- TRIATHLON [id number] [employer] [facility] [employer] HOW [id number] Right 1   INSERT TIB ARTC BEAR SZ8 19MM -- TRIATHLON - [id number]   INSERT TIB ARTC BEAR SZ8 19MM -- TRIATHLON [id number] [employer] HOW [id number] Right 1       Intra-Operative Findings: Pre-operatively we assess the motion of the patients knee and noted that the knee lacked approximately 10 degrees of extension. Intra-operatively we noted that the articular surfaces were arthritic with cartilage loss of both the medial and lateral compartments. The patella was examined and found to be in poor condition. The patella was  resurfaced. With trial components in place we assessed knee motion and found that the knee was able to fully extend. Flexion was felt to be 140 degrees. Description of Procedure:    Kevin Bocanegra had undergone adductor canal block in the preoperative holding area.  Kevin Bocanegra was brought to the operating room, positioned on the operating room table, and after appropriate identification underwent spinal anesthesia. A vasquez catheter was then placed. IV antibiotics were administered per proticol. The right leg and was then prepped and draped in the usual sterile manner. Timeout was taken identifying the patient, procedure ,operative side and surgeon. Prior to incision one gram of IV transexamic acid was administered intravenously. An anterior longitudinal incision was made just medial to the tibial tubercle and extending proximal.  A subvastas capsular incision was performed. The medial capsular flap was elevated around to the midcoronal plane. The patella was subluxed laterally and patellar fat pat partially excised. The knee was flexed and externally rotated. The articular surface revealed cartilage loss with exposed bone and bone spurs throughout all three compartments. The anterior cruciate ligament was resected. We first addressed our distal femoral resection. Using intramedullary instrumentation we resected 8mm of distal femur using a 5 degree valgus cut angle. Medial and lateral condylar cuts were verified to be level using the capture of the distal femoral cutting jig. We next addressed our proximal tibia. Using extramedullary intrstrumentation we resected 2mm of bone as measured from the more involved compartment. Our cut was verified to be perpendicular to the meachanical axis of the tibia as referenced from the tibial tubercle, the long axis of the tibia, the center of the ankle, and the patients 2nd toe. Our slope was cut with the goal of 0-3 degrees posterior slope. At this point a spacer block was used to verify that the knee was able to obtain full extension and was balanced in extension.  We did  not have to resect additional bone to achieve this goal. Once the knee was felt to fully extend and showed good balance in extension the distal femoral pins were removed and we moved on to finishing our distal femoral preparation. Prior to sizing our distal femur we marked Campbell's Line and our transepicondylar axis. Using sizing instrumentation the femur was sized to a #7 component. The anterior and posterior cuts along with the anterior and posterior chamfer cuts were then made using the 4-in-1 cutting block. Osteophytes were removed from the tibial and femoral surfaces. The PCL was assessed and felt to be in good condition and be very stable. Medial and lateral meniscus' were removed along with any redundant tissue. Any posterior osteophytes were removed. We then returned our attention to the tibial side. The tibia was then sized to a #8 component. The tibial component was assessed in terms of position and rotation. Once we felt that we had obtained symmetric coverage of the proximal tibia and had rotated the tibial tray to a point where the midline of the component was bisecting the middle and medial 3rd of the tibial tubercle we marked the proximal tibia with bovie cautery to scotty rotation and also pinned the tibial tray into place. We then performed a trial of the knee with trial components in place. We felt that with a 19 mm polyethylene trial in place the knee had acceptable varus and valgus stability throughout arc of motion, was able to fully extend, was not too tight in flexion, and had acceptable stability with anterior  Drawer testing. No additional surgical releases were required. Again we assessed our PCL and felt it was stable and in good condition. The patella was then everted and examined. The patella was felt to be in poor condition and the decision was made to  resurface the patella. Bone was resected to accomodate a size 40mm patella button. A trial reduction revealed appropriate tracking through the patellofemoral groove with no lateral retinacular release required.  Again patellar tracking was assessed and it was felt that the patella was tracking appropriately within the femoral trochlear groove. At this point the patella was irrigated of any debride and the final patellar component was inserted which was a tritanium-backed cementless component. At this point the trial polyethylene component and trial femoral component were removed. We again assessed our tibial tray and verified that we were satisfied with its position. We did not have to adjust its position. The proximal tibia was punched and drilled per protocol for the system. We irrigated and debrided all bony surfaces of residual tissue and bone. We then inserted the tibial and femoral components and noted them to be well seated. We then inserted our final polyethylene component which was a 19mm thick. Jaiden Powell's knee was placed through a range of motion and noted to be stable as mentioned above with the trial components. In additional we checked patellar tracking one last time which was felt to be appropriate. A lavage of diluted betadine solution of  17.5 ml Betadine in 500 of 0.9% normal saline was allowed to soak in the wound for 3 minutes after implanting of the prosthesis. The wound was irrigated with normal saline again before closure. We then carefully injected periarticular cocktail about and capsule and subcutaneous tissue. In addition, one additional gram of transexemic acid was given at the end of the case for a total dose of 2 grams. No drain was placed. The capsular layer was closed using a combination of 0-Stratafix bidirectional barbed suture and #2 Fiberwire. The subcutaneous layer was closed using a 2-0 Monocril interrupted suture. The skin was closed using staples. A sterile dressing was applied. The sponge count and needle counts were correct. Patient was transferred to the hospital stretcher and transported to recovery in stable condition.     Signed By: Georges Morocho MD

## 2018-04-18 NOTE — PERIOP NOTES
TRANSFER - OUT REPORT:    Verbal report given to [address] [place] [address] on Sheila Herman  being transferred to preop block for routine progression of care       Report consisted of patients Situation, Background, Assessment and   Recommendations(SBAR). Information from the following report(s) SBAR, MAR and Recent Results was reviewed with the receiving nurse. Lines:   Peripheral IV 04/18/18 Left Arm (Active)   Site Assessment Clean, dry, & intact 4/18/2018  6:36 AM   Phlebitis Assessment 0 4/18/2018  6:36 AM   Dressing Status Clean, dry, & intact 4/18/2018  6:36 AM   Dressing Type Transparent;Tape 4/18/2018  6:36 AM   Hub Color/Line Status Green; Infusing 4/18/2018  6:36 AM   Action Taken Blood drawn 4/18/2018  6:36 AM        Opportunity for questions and clarification was provided. Patient transported with:   [facility] Dr Miriam Rivas for orders as pt did not take Atenolol or Norvasc this AM- orders given for only Atenolol.

## 2018-04-18 NOTE — ANESTHESIA POSTPROCEDURE EVALUATION
Post-Anesthesia Evaluation and Assessment    Patient: Danielle Calvo MRN: [id number]  SSN: [id number]    YOB: 1960  Age: 59 y.o. Sex: male       Cardiovascular Function/Vital Signs  Visit Vitals    /63    Pulse 60    Temp 37.1 °C (98.8 °F)    Resp 16    Ht 6' 2\" (1.88 m)    Wt 112.4 kg (247 lb 14.4 oz)    SpO2 96%    BMI 31.83 kg/m2       Patient is status post spinal anesthesia for Procedure(s):  RIGHT KNEE TOTAL ARTHROPLASTY. Nausea/Vomiting: None    Postoperative hydration reviewed and adequate. Pain:  Pain Scale 1: Visual (04/18/18 1102)  Pain Intensity 1: 0 (04/18/18 1102)   Managed    Neurological Status:   Neuro (WDL): Exceptions to WDL (04/18/18 1102)  Neuro  Neurologic State: Drowsy (04/18/18 1102)  Orientation Level: Oriented to person (04/18/18 1102)       Mental Status and Level of Consciousness: Awake. Pulmonary Status:   O2 Device: Nasal cannula (04/18/18 1113)   Adequate oxygenation and airway patent    Complications related to anesthesia: None    Post-anesthesia assessment completed.  No concerns    Signed By: Argentina Sierra MD     April 18, 2018

## 2018-04-18 NOTE — PROGRESS NOTES
[address]  Face to Face Encounter    Patients Name: Nathan Bocanegra    YOB: 1960    Ordering Physician: Jess Bee    Primary Diagnosis: Primary osteoarthritis of right knee [M17.11]   S/p right TKA    Date of Face to Face:   4/18/2018                                  Face to Face Encounter findings are related to primary reason for home care:   yes. 1. I certify that the patient needs intermittent care as follows: physical therapy: gait/stair training    2. I certify that this patient is homebound, that is: 1) patient requires the use of a walker device, special transportation, or assistance of another to leave the home; or 2) patient's condition makes leaving the home medically contraindicated; and 3) patient has a normal inability to leave the home and leaving the home requires considerable and taxing effort. Patient may leave the home for infrequent and short duration for medical reasons, and occasional absences for non-medical reasons. Homebound status is due to the following functional limitations: Patient's ambulation limited secondary to severe pain and requires the use of an assistive device and the assistance of a caregiver for safe completion. Patient with strength and ROM deficits limiting ambulation endurance requiring the use of an assistive device and the assistance of a caregiver. Patient deemed temporarily homebound secondary to increased risk for infection when leaving home and going out into the community. 3. I certify that this patient is under my care and that I, or a nurse practitioner or  [id number], or clinical nurse specialist, or certified nurse midwife, working with me, had a Face-to-Face Encounter that meets the physician Face-to-Face Encounter requirements.   The following are the clinical findings from the [address] [place] [address] encounter that support the need for skilled services and is a summary of the encounter: see hospital chart        Tabathaailyn Aidamadalyn Merary Hall, [address]  4/18/2018      THE FOLLOWING TO BE COMPLETED BY THE COMMUNITY PHYSICIAN:    I concur with the findings described above from the F2F encounter that this patient is homebound and in need of a skilled service.     Certifying Physician: _____________________________________      Printed Certifying Physician Name: _____________________________________    Date: _________________

## 2018-04-18 NOTE — PROGRESS NOTES
Problem: Self Care Deficits Care Plan (Adult)  Goal: *Acute Goals and Plan of Care (Insert Text)  GOALS:   DISCHARGE GOALS (in preparation for going home/rehab):  3 days  1. Mr. Bocanegra will perform one lower body dressing activity with minimal assistance required to demonstrate improved functional mobility and safety. 2.  Mr. Bocanegra will perform one lower body bathing activity with minimal assistance required to demonstrate improved functional mobility and safety. 3.  Mr. Bocanegra will perform toileting/toilet transfer with contact guard assistance to demonstrate improved functional mobility and safety. 4.  Mr. Bocanegra will perform shower transfer with contact guard assistance to demonstrate improved functional mobility and safety. JOINT CAMP OCCUPATIONAL THERAPY TKA: Initial Assessment and PM 4/18/2018  INPATIENT: Hospital Day: 1  Payor: Marietta Bonilla / Plan: Feliciano Bryan / Product Type: Commerical /      NAME/AGE/GENDER: Afua Melvin is a 59 y.o. male   PRIMARY DIAGNOSIS:  Primary osteoarthritis of right knee [M17.11]   Procedure(s) and Anesthesia Type:     * RIGHT KNEE TOTAL ARTHROPLASTY - Spinal (Right)  ICD-10: Treatment Diagnosis:    · Pain in Right Knee (M25.561)  · Stiffness of Right Knee, Not elsewhere classified (M25.661)  · Other lack of cordination (R27.8)      ASSESSMENT:     Mr. Bocanegra is s/p right TKA and presents with decreased weight bearing on right LE and decreased independence with functional mobility and activities of daily living as compared to baseline level of function and safety. Patient would benefit from skilled Occupational Therapy to maximize independence and safety with self-care task and functional mobility.   Pt would also benefit from education on adaptive equipment and safety precautions in preparation for going home or for recommendations for post-hospital rehab program.  Patient plans for further rehab at home with home health services and good family support friends. OT reviewed therapy schedule and plan of care with patient. Patient was able to transfer and preform self care skills as charted below. Patient instructed to call for assistance when needing to get up from the bed and all needs in reach. Patient verbalized understanding of call light. This section established at most recent assessment   PROBLEM LIST (Impairments causing functional limitations):  1. Decreased Strength  2. Decreased ADL/Functional Activities  3. Decreased Transfer Abilities  4. Increased Pain  5. Increased Fatigue  6. Decreased Flexibility/Joint Mobility  7. Decreased Knowledge of Precautions   INTERVENTIONS PLANNED: (Benefits and precautions of occupational therapy have been discussed with the patient.)  1. Activities of daily living training  2. Adaptive equipment training  3. Balance training  4. Clothing management  5. Donning&doffing training  6. Theraputic activity     TREATMENT PLAN: Frequency/Duration: Follow patient 1 time to address above goals. Rehabilitation Potential For Stated Goals: Good     RECOMMENDED REHABILITATION/EQUIPMENT: (at time of discharge pending progress): Continue Skilled Therapy and Home Health: Physical Therapy. OCCUPATIONAL PROFILE AND HISTORY:   History of Present Injury/Illness (Reason for Referral): Pt presents this date s/p (right) TKA. Past Medical History/Comorbidities:   Mr. Antonio Elizondo  has a past medical history of Alcohol dependence, binge pattern ([facility] [address] (9/16/2002); Arthritis; Back pain (4/13/10); CAD (coronary artery disease) (04/30/2010); Chest pain (12/16/13); Cocaine abuse (1995); Degenerative arthritis of knee (9/10/09); Frontal sinusitis (6/29/10); Hyperlipidemia (03/28/2014); Knee effusion (6/23/09); Knee pain (6/23/09); Tobacco use (6/29/10); Unspecified essential hypertension (04/11/2014); Fior Cast (04/11/14); and Viral warts, unspecified (4/18/14).   Mr. Antonio Elizondo  has a past surgical history that includes hx appendectomy (1982); hx heent; hx knee arthroscopy (Bilateral); and hx orthopaedic. Social History/Living Environment:   Home Environment: Private residence  # Steps to Enter: 3  One/Two Story Residence: One story  Living Alone: Yes  Support Systems: Friends \ neighbors  Patient Expects to be Discharged to[de-identified] Private residence  Current DME Used/Available at Home: [facility], [address] chair  Tub or Shower Type: Shower  Prior Level of Function/Work/Activity:  Independent and working full time 10 hour days     Number of Personal Factors/Comorbidities that affect the Plan of Care: Brief history (0):  LOW COMPLEXITY   ASSESSMENT OF OCCUPATIONAL PERFORMANCE[de-identified]   Most Recent Physical Functioning:   Balance  Sitting: Intact  Standing: Impaired;Pull to stand; With support                    Coordination  Fine Motor Skills-Upper: Left Intact; Right Intact  Gross Motor Skills-Upper: Left Intact; Right Intact         Mental Status  Neurologic State: Alert; Appropriate for age  Orientation Level: Appropriate for age  Cognition: Appropriate decision making; Appropriate for age attention/concentration; Appropriate safety awareness; Follows commands  Perception: Appears intact  Perseveration: No perseveration noted  Safety/Judgement: Awareness of environment                Basic ADLs (From Assessment) Complex ADLs (From Assessment)   Basic ADL  Feeding: Setup  Oral Facial Hygiene/Grooming: Supervision  Bathing: Moderate assistance  Upper Body Dressing: Supervision  Lower Body Dressing: Moderate assistance  Toileting:  Total assistance (catheter)     Grooming/Bathing/Dressing Activities of Daily Living     Cognitive Retraining  Safety/Judgement: Awareness of environment                 Functional Transfers  Toilet Transfer : Minimum assistance;Assist x2  Shower Transfer: Minimum assistance;Assist x2     Bed/Mat Mobility  Supine to Sit: Contact guard assistance  Sit to Supine: Contact guard assistance  Sit to Stand: Minimum assistance;Assist x2  Scooting: Contact guard assistance         Physical Skills Involved:  1. Range of Motion  2. Balance  3. Strength Cognitive Skills Affected (resulting in the inability to perform in a timely and safe manner): 1. none Psychosocial Skills Affected:  1. none   Number of elements that affect the Plan of Care: 1-3:  LOW COMPLEXITY   CLINICAL DECISION MAKIN[address] [place] [address] AM-PAC 6 Clicks   Daily Activity Inpatient Short Form  How much help from another person does the patient currently need. .. Total A Lot A Little None   1. Putting on and taking off regular lower body clothing? [] 1   [x] 2   [] 3   [] 4   2. Bathing (including washing, rinsing, drying)? [] 1   [x] 2   [] 3   [] 4   3. Toileting, which includes using toilet, bedpan or urinal?   [x] 1   [] 2   [] 3   [] 4   4. Putting on and taking off regular upper body clothing? [] 1   [] 2   [x] 3   [] 4   5. Taking care of personal grooming such as brushing teeth? [] 1   [] 2   [x] 3   [] 4   6. Eating meals? [] 1   [] 2   [] 3   [x] 4   © , Trustees of [address] [place] [address], under license to [employer]. All rights reserved     Score:  Initial: 15 Most Recent: X (Date: -- )    Interpretation of Tool:  Represents activities that are increasingly more difficult (i.e. Bed mobility, Transfers, Gait). Score 24 23 22-20 19-15 14-10 9-7 6     Modifier CH CI CJ CK CL CM CN      ?  Self Care:     - CURRENT STATUS: CK - 40%-59% impaired, limited or restricted    - GOAL STATUS: CJ - 20%-39% impaired, limited or restricted    - D/C STATUS:  ---------------To be determined---------------  Payor: [employer] / Plan: Osorio Womack / Product Type: Commerical /      Medical Necessity:     · Patient is expected to demonstrate progress in balance, coordination and functional technique to decrease assistance required with self care and functional mobility and improve safety during self care and functional mobility. Reason for Services/Other Comments:  · Patient continues to require skilled intervention due to decreased self care and functional mobility. Use of outcome tool(s) and clinical judgement create a POC that gives a: LOW COMPLEXITY            TREATMENT:   (In addition to Assessment/Re-Assessment sessions the following treatments were rendered)     Pre-treatment Symptoms/Complaints:    Pain: Initial:   Pain Intensity 1: 0  Post Session:  1/10 rest, iceman in place     Assessment/Reassessment only, no treatment provided today    Treatment/Session Assessment:     Response to Treatment:  Tolerated well, very motivated. Education:  [] Home Exercises  [x] Fall Precautions  [] Hip Precautions [] Going Home Video  [x] Knee/Hip Prosthesis Review  [x] Walker Management/Safety [x] Adaptive Equipment as Needed       Interdisciplinary Collaboration:   o Physical Therapist  o Occupational Therapist  o Registered Nurse    After treatment position/precautions:   o Supine in bed  o Bed alarm/tab alert on  o Bed/Chair-wheels locked  o Bed in low position  o Call light within reach  o RN notified  o Side rails x 3     Compliance with Program/Exercises: compliant all of the time. Recommendations/Intent for next treatment session:  Treatment next visit will focus on increasing Mr. Chan Cowart independence with bed mobility, transfers, self care, functional mobility, modalities for pain, and patient education.       Total Treatment Duration:  OT Patient Time In/Time Out  Time In: 1320  Time Out: Kamila Varela 85, OT

## 2018-04-18 NOTE — ANESTHESIA PROCEDURE NOTES
Spinal Block    Performed by: Ilan Castillo  Authorized by: Ilan Castillo     Pre-procedure:   Indications: primary anesthetic  Preanesthetic Checklist: patient identified, risks and benefits discussed, anesthesia consent, patient being monitored and timeout performed    Timeout Time: 09:25          Spinal Block:   Patient Position:  Seated  Prep Region:  Lumbar  Prep: chlorhexidine and patient draped      Location:  L3-4  Technique:  Single shot  Local:  Lidocaine 1%  Local Dose (mL):  3    Needle:   Needle Type:  Pencan  Needle Gauge:  25 G  Attempts:  1      Events: CSF confirmed, no blood with aspiration and no paresthesia        Assessment:  Insertion:  Uncomplicated  Patient tolerance:  Patient tolerated the procedure well with no immediate complications

## 2018-04-18 NOTE — PROGRESS NOTES
TRANSFER - IN REPORT:    Verbal report received from [facility] (name) on Kayla Bocanegra  being received from PACU (unit) for routine progression of care      Report consisted of patients Situation, Background, Assessment and   Recommendations(SBAR). Information from the following report(s) SBAR, Kardex, OR Summary, Procedure Summary, Intake/Output, MAR, Accordion and Med Rec Status was reviewed with the receiving nurse. Opportunity for questions and clarification was provided. Assessment completed upon patients arrival to unit and care assumed.
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
## 2018-04-18 NOTE — PROGRESS NOTES
Problem: Mobility Impaired (Adult and Pediatric)  Goal: *Acute Goals and Plan of Care (Insert Text)  GOALS (1-4 days):  (1.)Mr. Bocanegra will move from supine to sit and sit to supine  in bed with SUPERVISION. (2.)Mr. Bocanegra will transfer from bed to chair and chair to bed with STAND BY ASSIST using the least restrictive device. (3.)Mr. Bocanegra will ambulate with STAND BY ASSIST for 200 feet with the least restrictive device. (4.)Mr. Bocanegra will ambulate up/down 3 steps with no railing with MINIMAL ASSIST with no device. (5.)Mr. Bocanegra will increase right knee ROM to 5°-80°.  ________________________________________________________________________________________________    PHYSICAL THERAPY Joint camp tKa: Initial Assessment, Treatment Day: Day of Assessment, PM 4/18/2018  INPATIENT: Hospital Day: 1  Payor: Sacha Aviles / Plan: Damaris Romero / Product Type: Commerical /      NAME/AGE/GENDER: Bahman Roe is a 59 y.o. male   PRIMARY DIAGNOSIS:  Primary osteoarthritis of right knee [M17.11]   Procedure(s) and Anesthesia Type:     * RIGHT KNEE TOTAL ARTHROPLASTY - Spinal (Right)  ICD-10: Treatment Diagnosis:    · Pain in Right Knee (M25.561)  · Stiffness of Right Knee, Not elsewhere classified (M25.661)  · Difficulty in walking, Not elsewhere classified (R26.2)      ASSESSMENT:     Mr. Bocanegra presents with impaired strength & mobility s/p right TKA. Pt also had decreased stability during out of bed activity. Pt will benefit from follow up therapy to help restore safe function prior to returning home with carver. This section established at most recent assessment   PROBLEM LIST (Impairments causing functional limitations):  1. Decreased Strength  2. Decreased ADL/Functional Activities  3. Decreased Transfer Abilities  4. Decreased Ambulation Ability/Technique  5. Decreased Balance  6. Increased Pain  7. Decreased Activity Tolerance  8. Decreased Flexibility/Joint Mobility  9.  Decreased [place] with Home Exercise Program   INTERVENTIONS PLANNED: (Benefits and precautions of physical therapy have been discussed with the patient.)  1. Bed Mobility  2. Gait Training  3. Home Exercise Program (HEP)  4. Therapeutic Exercise/Strengthening  5. Transfer Training  6. Range of Motion: active/assisted/passive  7. Therapeutic Activities  8. Group Therapy     TREATMENT PLAN: Frequency/Duration: Follow patient BID for duration of hospital stay to address above goals. Rehabilitation Potential For Stated Goals: Good     RECOMMENDED REHABILITATION/EQUIPMENT: (at time of discharge pending progress): Continue Skilled Therapy and Home Health: Physical Therapy. HISTORY:   History of Present Injury/Illness (Reason for Referral): The patient has end stage arthritis of the right knee. The patient was evaluated and examined during a consultation prior to today. The patient complains of knee pain with activities, reports pain as mostly occurring along the joint lines, reports stiffness of the knee with prolonged inactivity, and swelling/pain at the end of the day and after increased physical activity. The pain affects the patients activities of daily living and quality of life. The patient has attempted and exhausted conservative treatment. There have been no changes to the patient's orthopedic condition since the last office visit. Past Medical History/Comorbidities:   Mr. Vika Woody  has a past medical history of Alcohol dependence, binge pattern ([address] (9/16/2002); Arthritis; Back pain (4/13/10); CAD (coronary artery disease) (04/30/2010); Chest pain (12/16/13); Cocaine abuse (1995); Degenerative arthritis of knee (9/10/09); Frontal sinusitis (6/29/10); Hyperlipidemia (03/28/2014); Knee effusion (6/23/09); Knee pain (6/23/09); Tobacco use (6/29/10); Unspecified essential hypertension (04/11/2014); [place] (04/11/14); and Viral warts, unspecified (4/18/14).   Mr. Vika Woody  has a past surgical history that includes hx appendectomy (1982); hx heent; hx knee arthroscopy (Bilateral); and hx orthopaedic. Social History/Living Environment:   Home Environment: Private residence  # Steps to Enter: 3  Rails to Enter: No  One/Two Story Residence: One story  Living Alone: Yes  Support Systems: Family member(s)  Patient Expects to be Discharged to[de-identified] Private residence  Current DME Used/Available at Home: [employer], [address] [employer] [address] chair  Tub or Shower Type: Shower  Prior Level of Function/Work/Activity:  Pt was independent without an assistive device prior to this admission   Number of Personal Factors/Comorbidities that affect the Plan of Care: 3+: HIGH COMPLEXITY   EXAMINATION:   Most Recent Physical Functioning:   Gross Assessment: Yes  Gross Assessment  AROM: Within functional limits (left LE)  Strength: Within functional limits (left LE)  Coordination: Within functional limits (left LE)         RLE PROM  R Knee Flexion: 60 (~post op)  R Knee Extension: -15 (~post op)           Bed Mobility  Supine to Sit: Contact guard assistance  Sit to Supine: Contact guard assistance  Scooting: Contact guard assistance    Transfers  Sit to Stand: Minimum assistance;Assist x2  Stand to Sit: Minimum assistance;Assist x2  Bed to Chair:  (NT)    Balance  Sitting: Intact; Without support  Standing: Impaired; With support (walker)              Weight Bearing Status  Right Side Weight Bearing: As tolerated  Distance (ft): 5 Feet (ft)  Ambulation - Level of Assistance: Minimal assistance;Assist x2  Assistive Device: Walker, rolling  Speed/Mckenzie: Shuffled; Slow  Step Length: Left shortened;Right shortened  Stance: Right decreased  Gait Abnormalities: Antalgic;Decreased step clearance        Braces/Orthotics: none    Right Knee Cold  Type: Cryocuff      Body Structures Involved:  1. Joints  2. Muscles Body Functions Affected:  1. Sensory/Pain  2. Movement Related Activities and Participation Affected:  1. General Tasks and Demands  2.  Mobility Number of elements that affect the Plan of Care: 4+: HIGH COMPLEXITY   CLINICAL PRESENTATION:   Presentation: Stable and uncomplicated: LOW COMPLEXITY   CLINICAL DECISION MAKIN [facility] [address] [id number] AM-PAC 6 Clicks   Basic Mobility Inpatient Short Form  How much difficulty does the patient currently have. .. Unable A Lot A Little None   1. Turning over in bed (including adjusting bedclothes, sheets and blankets)? [] 1   [] 2   [x] 3   [] 4   2. Sitting down on and standing up from a chair with arms ( e.g., wheelchair, bedside commode, etc.)   [] 1   [] 2   [x] 3   [] 4   3. Moving from lying on back to sitting on the side of the bed? [] 1   [] 2   [x] 3   [] 4   How much help from another person does the patient currently need. .. Total A Lot A Little None   4. Moving to and from a bed to a chair (including a wheelchair)? [] 1   [] 2   [x] 3   [] 4   5. Need to walk in hospital room? [] 1   [] 2   [x] 3   [] 4   6. Climbing 3-5 steps with a railing? [x] 1   [] 2   [] 3   [] 4   © , Trustees of [facility] [address] [address], under license to [employer]. All rights reserved        Score:  Initial: 16 Most Recent: X (Date: -- )    Interpretation of Tool:  Represents activities that are increasingly more difficult (i.e. Bed mobility, Transfers, Gait). Score 24 23 22-20 19-15 14-10 9-7 6     Modifier CH CI CJ CK CL CM CN      ? Mobility - Walking and Moving Around:     - CURRENT STATUS: CK - 40%-59% impaired, limited or restricted    - GOAL STATUS: CJ - 20%-39% impaired, limited or restricted    - D/C STATUS:  ---------------To be determined---------------  Payor: GENERIC COMMERCIAL / Plan: [facility] GENERIC COMMERCIAL / Product Type: Commerical /      Medical Necessity:     · Patient is expected to demonstrate progress in strength, range of motion, balance, coordination and functional technique to decrease assistance required with bed mobility, transfers & gait.   Reason for Services/Other Comments:  · Patient continues to require skilled intervention due to pt not independent with functional mobility. Use of outcome tool(s) and clinical judgement create a POC that gives a: Clear prediction of patient's progress: LOW COMPLEXITY            TREATMENT:   (In addition to Assessment/Re-Assessment sessions the following treatments were rendered)     Pre-treatment Symptoms/Complaints:  none  Pain: Initial: visual scale  Pain Intensity 1: 3  Pain Location 1: Knee  Pain Orientation 1: Right  Pain Intervention(s) 1: Cold pack, Repositioned  Post Session:  3/10     Assessment/Reassessment only, no treatment provided today       Date:   Date:   Date:     ACTIVITY/EXERCISE AM PM AM PM AM PM   GROUP THERAPY  []  []  []  []  []  []   Ankle Pumps         Quad Sets         Gluteal Sets         Hip ABd/ADduction         Straight Leg Raises         Knee Slides         Short Arc Quads         Long Arc Quads         Chair Slides                  B = bilateral; AA = active assistive; A = active; P = passive      Treatment/Session Assessment:     Response to Treatment:  Tolerated well    Education:  [] Home Exercises  [x] Fall Precautions  [] Hip Precautions [] D/C Instruction Review  [] Knee/Hip Prosthesis Review  [x] Walker Management/Safety [] Adaptive Equipment as Needed       Interdisciplinary Collaboration:   o Occupational Therapist  o Registered Nurse    After treatment position/precautions:   o Supine in bed  o Bed/Chair-wheels locked  o Bed in low position  o Call light within reach  o RN notified    Compliance with Program/Exercises: Will assess as treatment progresses. Recommendations/Intent for next treatment session:  Treatment next visit will focus on increasing Mr. Bermudez Gravely independence with bed mobility, transfers, gait training, strength/ROM exercises, modalities for pain, and patient education.       Total Treatment Duration:  PT Patient Time In/Time Out  Time In: 1310  Time Out: [address] [facility] [place]

## 2018-04-18 NOTE — ANESTHESIA PROCEDURE NOTES
Peripheral Block    Start time: 4/18/2018 8:16 AM  End time: 4/18/2018 8:20 AM  Performed by: Ilan Castillo  Authorized by: Ilan Castillo       Pre-procedure: Indications: at surgeon's request and post-op pain management    Preanesthetic Checklist: patient identified, risks and benefits discussed, site marked, timeout performed, anesthesia consent given and patient being monitored    Timeout Time: 08:16          Block Type:   Block Type: Adductor canal  Laterality:  Right  Monitoring:  Standard ASA monitoring, continuous pulse ox, frequent vital sign checks, heart rate, oxygen and responsive to questions  Injection Technique:  Single shot  Procedures: ultrasound guided    Prep: chlorhexidine    Location:  Mid thigh  Needle Type:  Stimuplex (4 inch)  Needle Gauge:  20 G  Needle Localization:  Ultrasound guidance  Medication Injected:  0.5%  ropivacaine  Adds:  Epi 1:400K  Volume (mL):  30  dexamethasone  Volume (mL):  0.5    Assessment:  Number of attempts:  1  Injection Assessment:  Incremental injection every 5 mL, local visualized surrounding nerve on ultrasound, negative aspiration for blood, no intravascular symptoms, no paresthesia and ultrasound image on chart  Patient tolerance:  Patient tolerated the procedure well with no immediate complications  3 cc 1% Lidocaine injected at needle insertion site.

## 2018-04-18 NOTE — IP AVS SNAPSHOT
[address] [facility] [address] 
[phone] Patient: Jassi Bocanegra MRN: [id number] EST:65/65/1825 About your hospitalization You were admitted on:  April 18, 2018 You last received care in the:  [facility] You were discharged on:  April 20, 2018 Why you were hospitalized Your primary diagnosis was:  Not on File Your diagnoses also included:  Oa (Osteoarthritis) Of Knee Follow-up Information Follow up With Details Comments Contact Info Eliana Hurt MD  As needed [address] [facility] [place] [address] 
[phone] Richie Mosley MD  As scheduled by office [address] [facility] [address] [place] [address] 
[phone] [address]  Will contact you within 48 hrs [address] [facility] [address] [facility] [address] 
[phone] Your Scheduled Appointments Friday June 08, 2018  7:40 AM EDT  
LAB with IMD LAB [facility] ([address] [facility] 
[address] [facility] [id number] [phone] Friday Corine 15, 2018  9:00 AM EDT Office Visit with Eliana Hurt MD  
[facility] Diagnostics ([address] 2 [facility] 
[address] [facility] [id number] [phone] Discharge Orders None A check scotty indicates which time of day the medication should be taken. My Medications START taking these medications Instructions Each Dose to Equal  
 Morning Noon Evening Bedtime  
 acetaminophen 500 mg tablet Commonly known as:  TYLENOL Your next dose is: Today Take 2 Tabs by mouth every six (6) hours. 1000 mg  
    
   
   
  
   
  
 aspirin delayed-release 81 mg tablet Your next dose is: Today Take 1 Tab by mouth every twelve (12) hours every twelve (12) hours. 81 mg  
    
   
   
  
   
  
 cyclobenzaprine 10 mg tablet Commonly known as:  FLEXERIL Your next dose is: Today Take 0.5 Tabs by mouth three (3) times daily. 5 mg  
    
   
   
  
   
  
 gabapentin 100 mg capsule Commonly known as:  NEURONTIN Your next dose is: Today Take 1 Cap by mouth two (2) times a day. 100 mg HYDROmorphone 2 mg tablet Commonly known as:  DILAUDID Your next dose is: Today Take 1 Tab by mouth every four (4) hours as needed. Max Daily Amount: 12 mg.  
 2 mg  
    
   
   
  
   
  
 ondansetron 8 mg disintegrating tablet Commonly known as:  ZOFRAN ODT Your next dose is: Today Take 1 Tab by mouth every eight (8) hours as needed for Nausea. 8 mg  
    
   
   
  
   
  
 senna-docusate 8.6-50 mg per tablet Commonly known as:  Albaro Wills Your next dose is:  Tomorrow Take 2 Tabs by mouth daily. 2 Tab CHANGE how you take these medications Instructions Each Dose to Equal  
 Morning Noon Evening Bedtime  
 meloxicam 7.5 mg tablet Commonly known as:  MOBIC What changed:  how much to take Your next dose is:  Tomorrow Take 2 Tabs by mouth daily. 15 mg CONTINUE taking these medications Instructions Each Dose to Equal  
 Morning Noon Evening Bedtime  
 amLODIPine 5 mg tablet Commonly known as:  Roselle Ruth Your next dose is:  Tomorrow Take 1 Tab by mouth daily. 5 mg  
    
  
   
   
   
  
 atenolol 50 mg tablet Commonly known as:  TENORMIN Your next dose is:  Tomorrow Take 2 Tabs by mouth daily. 100 mg  
    
  
   
   
   
  
 rosuvastatin 10 mg tablet Commonly known as:  CRESTOR Your next dose is: Today Take 1 Tab by mouth daily. 10 mg Where to Get Your Medications Information on where to get these meds will be given to you by the nurse or doctor. ! Ask your nurse or doctor about these medications  
  acetaminophen 500 mg tablet  
 aspirin delayed-release 81 mg tablet  
 cyclobenzaprine 10 mg tablet  
 gabapentin 100 mg capsule HYDROmorphone 2 mg tablet  
 ondansetron 8 mg disintegrating tablet  
 senna-docusate 8.6-50 mg per tablet Opioid Education Prescription Opioids: What You Need to Know: 
 
Prescription opioids can be used to help relieve moderate-to-severe pain and are often prescribed following a surgery or injury, or for certain health conditions. These medications can be an important part of treatment but also come with serious risks. Opioids are strong pain medicines. Examples include hydrocodone, oxycodone, fentanyl, and morphine. Heroin is an example of an illegal opioid. It is important to work with your health care provider to make sure you are getting the safest, most effective care. WHAT ARE THE RISKS AND SIDE EFFECTS OF OPIOID USE? Prescription opioids carry serious risks of addiction and overdose, especially with prolonged use. An opioid overdose, often marked by slow breathing, can cause sudden death. The use of prescription opioids can have a number of side effects as well, even when taken as directed. · Tolerance-meaning you might need to take more of a medication for the same pain relief · Physical dependence-meaning you have symptoms of withdrawal when the medication is stopped. Withdrawal symptoms can include nausea, sweating, chills, diarrhea, stomach cramps, and muscle aches. Withdrawal can last up to several weeks, depending on which drug you took and how long you took it. · Increased sensitivity to pain · Constipation · Nausea, vomiting, and dry mouth · Sleepiness and dizziness · Confusion · Depression · Low levels of testosterone that can result in lower sex drive, energy, and strength · Itching and sweating RISKS ARE GREATER WITH:      
· History of drug misuse, substance use disorder, or overdose · Mental health conditions (such as depression or anxiety) · Sleep apnea · Older age (72 years or older) · Pregnancy Avoid alcohol while taking prescription opioids. Also, unless specifically advised by your health care provider, medications to avoid include: · Benzodiazepines (such as Xanax or Valium) · Muscle relaxants (such as Soma or Flexeril) · Hypnotics (such as Ambien or Lunesta) · Other prescription opioids KNOW YOUR OPTIONS Talk to your health care provider about ways to manage your pain that don't involve prescription opioids. Some of these options may actually work better and have fewer risks and side effects. Options may include: 
· Pain relievers such as acetaminophen, ibuprofen, and naproxen · Some medications that are also used for depression or seizures · Physical therapy and exercise · Counseling to help patients learn how to cope better with triggers of pain and stress. · Application of heat or cold compress · Massage therapy · Relaxation techniques Be Informed Make sure you know the name of your medication, how much and how often to take it, and its potential risks & side effects. IF YOU ARE PRESCRIBED OPIOIDS FOR PAIN: 
· Never take opioids in greater amounts or more often than prescribed. Remember the goal is not to be pain-free but to manage your pain at a tolerable level. · Follow up with your primary care provider to: · Work together to create a plan on how to manage your pain. · Talk about ways to help manage your pain that don't involve prescription opioids. · Talk about any and all concerns and side effects. · Help prevent misuse and abuse. · Never sell or share prescription opioids · Help prevent misuse and abuse.  
· Store prescription opioids in a secure place and out of reach of others (this may include visitors, children, friends, and family). · Safely dispose of unused/unwanted prescription opioids: Find your community drug take-back program or your pharmacy mail-back program, or flush them down the toilet, following guidance from the Food and Drug Administration (www.fda.gov/Drugs/ResourcesForYou). · Visit www.cdc.gov/drugoverdose to learn about the risks of opioid abuse and overdose. · If you believe you may be struggling with addiction, tell your health care provider and ask for guidance or call [address] [place] [address] at [phone]. Discharge Instructions 100[address] [facility] Patient Discharge Instructions Adam Mayberry / [id number] : 1960 Admitted 2018 Discharged: 2018 IF YOU HAVE ANY PROBLEMS ONCE YOU ARE AT HOME CALL THE FOLLOWING NUMBERS:  
Main office number: [phone] Take Home Medications · It is important that you take the medication exactly as they are prescribed. · Keep your medication in the bottles provided by the pharmacist and keep a list of the medication names, dosages, and times to be taken in your wallet. · Do not take other medications without consulting your doctor. What to do at [facility] Resume your prehospital diet. If you have excessive nausea or vomitting call your doctor's office Home Physical Therapy is arranged. Use rolling walker when walking. Patients who have had a joint replacement should not drive until you are seen for your follow up appointment by Dr. Heidy Unger. When to Call - Call if you have a temperature greater then 101 
- Unable to keep food down - Loose control of your bladder or bowel function - Are unable to bear any weight  
- Need a pain medication refill DISCHARGE SUMMARY from Nurse The following personal items collected during your admission are returned to you: Dental Appliance: Dental Appliances: None Vision: Visual Aid: Glasses Hearing Aid:   na 
Jewelry: Jewelry: None Clothing:   self Other Valuables: Other Valuables: Cell Phone (pt states son has wallet ad he is ok to leave cell phone in [facility] in [place]) Valuables sent to safe:   na 
 
PATIENT INSTRUCTIONS: 
 
After general anesthesia or intravenous sedation, for 24 hours or while taking prescription Narcotics: · Limit your activities · Do not drive and operate hazardous machinery · Do not make important personal or business decisions · Do  not drink alcoholic beverages · If you have not urinated within 8 hours after discharge, please contact your surgeon on call. Report the following to your surgeon: 
· Excessive pain, swelling, redness or odor of or around the surgical area · Temperature over 101 · Nausea and vomiting lasting longer than 4 hours or if unable to take medications · Any signs of decreased circulation or nerve impairment to extremity: change in color, persistent  numbness, tingling, coldness or increase pain · Any questions, call office @ [phone] Keep scheduled follow up appointment. If need to change, call office @ [phone]. *  Please give a list of your current medications to your Primary Care Provider. *  Please update this list whenever your medications are discontinued, doses are 
    changed, or new medications (including over-the-counter products) are added. *  Please carry medication information at all times in case of emergency situations. Total Knee Replacement: What to Expect at Home Your Recovery When you leave the hospital, you should be able to move around with a walker or crutches. But you will need someone to help you at home for the next few weeks or until you have more energy and can move around better. If there is no one to help you at home, you may go to a rehabilitation center. You will go home with a bandage and stitches or staples. Change the bandage as your doctor tells you to. Your doctor will remove your stitches or staples 10 to 21 days after your surgery. You may still have some mild pain, and the area may be swollen for 3 to 6 months after surgery. Your knee will continue to improve for 6 to 12 months. You will probably use a walker for 1 to 3 weeks and then use crutches. When you are ready, you can use a cane. You will probably be able to walk on your own in 4 to 8 weeks. You will need to do months of physical rehabilitation (rehab) after a knee replacement. Rehab will help you strengthen the muscles of the knee and help you regain movement. After you recover, your artificial knee will allow you to do normal daily activities with less pain or no pain at all. You may be able to hike, dance, ride a bike, and play golf. Talk to your doctor about whether you can do more strenuous activities. Always tell your caregivers that you have an artificial knee. How long it will take to walk on your own, return to normal activities, and go back to work depends on your health and how well your rehabilitation (rehab) program goes. The better you do with your rehab exercises, the quicker you will get your strength and movement back. This care sheet gives you a general idea about how long it will take for you to recover. But each person recovers at a different pace. Follow the steps below to get better as quickly as possible. How can you care for yourself at home? Activity ? · Rest when you feel tired. You may take a nap, but do not stay in bed all day. When you sit, use a chair with arms. You can use the arms to help you stand up. ? · Work with your physical therapist to find the best way to exercise. You may be able to take frequent, short walks using crutches or a walker.  What you can do as your knee heals will depend on whether your new knee is cemented or uncemented. You may not be able to do certain things for a while if your new knee is uncemented. ? · After your knee has healed enough, you can do more strenuous activities with caution. ¨ You can golf, but use a golf cart, and do not wear shoes with spikes. ¨ You can bike on a flat road or on a stationary bike. Avoid biking up hills. ¨ Your doctor may suggest that you stay away from activities that put stress on your knee. These include tennis or badminton, squash or racquetball, contact sports like football, jumping (such as in basketball), jogging, or running. ¨ Avoid activities where you might fall. These include horseback riding, skiing, and mountain biking. ? · Do not sit for more than 1 hour at a time. Get up and walk around for a while before you sit again. If you must sit for a long time, prop up your leg with a chair or footstool. This will help you avoid swelling. ? · Ask your doctor when you can shower. You may need to take sponge baths until your stitches or staples have been removed. ? · Ask your doctor when you can drive again. It may take up to 8 weeks after knee replacement surgery before it is safe for you to drive. ? · When you get into a car, sit on the edge of the seat. Then pull in your legs, and turn to face the front. ? · You should be able to do many everyday activities 3 to 6 weeks after your surgery. You will probably need to take 4 to 16 weeks off from work. When you can go back to work depends on the type of work you do and how you feel. ? · Ask your doctor when it is okay for you to have sex. ? · Do not lift anything heavier than 10 pounds and do not lift weights for 12 weeks. Diet ? · By the time you leave the hospital, you should be eating your normal diet. If your stomach is upset, try bland, low-fat foods like plain rice, broiled chicken, toast, and yogurt. Your doctor may suggest that you take iron and vitamin supplements. ? · Drink plenty of fluids (unless your doctor tells you not to). ? · Eat healthy foods, and watch your portion sizes. Try to stay at your ideal weight. Too much weight puts more stress on your new knee. ? · You may notice that your bowel movements are not regular right after your surgery. This is common. Try to avoid constipation and straining with bowel movements. You may want to take a fiber supplement every day. If you have not had a bowel movement after a couple of days, ask your doctor about taking a mild laxative. Medicines ? · Your doctor will tell you if and when you can restart your medicines. He or she will also give you instructions about taking any new medicines. ? · If you take blood thinners, such as warfarin (Coumadin), clopidogrel (Plavix), or aspirin, be sure to talk to your doctor. He or she will tell you if and when to start taking those medicines again. Make sure that you understand exactly what your doctor wants you to do.  
? · Your doctor may give you a blood-thinning medicine to prevent blood clots. If you take a blood thinner, be sure you get instructions about how to take your medicine safely. Blood thinners can cause serious bleeding problems. This medicine could be in pill form or as a shot (injection). If a shot is necessary, your doctor will tell you how to do this. ? · Be safe with medicines. Take pain medicines exactly as directed. ¨ If the doctor gave you a prescription medicine for pain, take it as prescribed. ¨ If you are not taking a prescription pain medicine, ask your doctor if you can take an over-the-counter medicine. ¨ Plan to take your pain medicine 30 minutes before exercises. It is easier to prevent pain before it starts than to stop it once it has started. ? · If you think your pain medicine is making you sick to your stomach: 
¨ Take your medicine after meals (unless your doctor has told you not to). ¨ Ask your doctor for a different pain medicine. ? · If your doctor prescribed antibiotics, take them as directed. Do not stop taking them just because you feel better. You need to take the full course of antibiotics. Incision care ? · You will have a bandage over the cut (incision) and staples or stitches. Take the bandage off when your doctor says it is okay. ? · Your doctor will remove the staples or stitches 10 days to 3 weeks after the surgery and replace them with strips of tape. Leave the tape on for a week or until it falls off. Exercise ? · Your rehab program will give you a number of exercises to do to help you get back your knee's range of motion and strength. Always do them as your therapist tells you. Ice and elevation ? · For pain and swelling, put ice or a cold pack on the area for 10 to 20 minutes at a time. Put a thin cloth between the ice and your skin. Other instructions ? · Continue to wear your support stockings as your doctor says. These help to prevent blood clots. The length of time that you will have to wear them depends on your activity level and the amount of swelling. ? · You have metal pieces in your knee. These may set off some airport metal detectors. Carry a medical alert card that says you have an artificial joint, just in case. Follow-up care is a key part of your treatment and safety. Be sure to make and go to all appointments, and call your doctor if you are having problems. It's also a good idea to know your test results and keep a list of the medicines you take. When should you call for help? Call 911 anytime you think you may need emergency care. For example, call if: 
? · You passed out (lost consciousness). ? · You have severe trouble breathing. ? · You have sudden chest pain and shortness of breath, or you cough up blood. ?Call your doctor now or seek immediate medical care if: 
? · You have signs of infection, such as: 
¨ Increased pain, swelling, warmth, or redness. ¨ Red streaks leading from the incision. ¨ Pus draining from the incision. ¨ A fever. ? · You have signs of a blood clot, such as: 
¨ Pain in your calf, back of the knee, thigh, or groin. ¨ Redness and swelling in your leg or groin. ? · Your incision comes open and begins to bleed, or the bleeding increases. ? · You have pain that does not get better after you take pain medicine. ? Watch closely for changes in your health, and be sure to contact your doctor if: 
? · You do not have a bowel movement after taking a laxative. Where can you learn more? Go to http://juan-leti.info/. Enter A555 in the search box to learn more about \"Total Knee Replacement: What to Expect at Home. \" Current as of: March 21, 2017 Content Version: 11.4 © 8667-0215 [employer]. Care instructions adapted under license by [employer] (which disclaims liability or warranty for this information). If you have questions about a medical condition or this instruction, always ask your healthcare professional. [address] any warranty or liability for your use of this information. These are general instructions for a healthy lifestyle: No smoking/ No tobacco products/ Avoid exposure to second hand smoke Surgeon General's Warning:  Quitting smoking now greatly reduces serious risk to your health. Obesity, smoking, and sedentary lifestyle greatly increases your risk for illness A healthy diet, regular physical exercise & weight monitoring are important for maintaining a healthy lifestyle You may be retaining fluid if you have a history of heart failure or if you experience any of the following symptoms:  Weight gain of 3 pounds or more overnight or 5 pounds in a week, increased swelling in our hands or feet or shortness of breath while lying flat in bed.   Please call your doctor as soon as you notice any of these symptoms; do not wait until your next office visit. Recognize signs and symptoms of STROKE: 
 
F-face looks uneven A-arms unable to move or move even S-speech slurred or non-existent T-time-call 911 as soon as signs and symptoms begin-DO NOT go Back to bed or wait to see if you get better-TIME IS BRAIN. The discharge information has been reviewed with the patient. The patient verbalized understanding. Information obtained by : 
I understand that if any problems occur once I am at home I am to contact my physician. I understand and acknowledge receipt of the instructions indicated above. Physician's or R.N.'s Signature                                                                  Date/Time Patient or Representative Signature                                                          Date/Time Introducing [facility]! [facility] introduces [employer] patient portal. Now you can access parts of your medical record, email your doctor's office, and request medication refills online. 1. In your internet browser, go to [email or web address] 2. Click on the First Time User? Click Here link in the Sign In box. You will see the New Member Sign Up page. 3. Enter your [employer] Access Code exactly as it appears below. You will not need to use this code after youve completed the sign-up process. If you do not sign up before the expiration date, you must request a new code. · [employer] Access Code: [email or web address] Expires: 6/4/2018  5:59 AM 
 
4. Enter the last four digits of your Social Security Number (xxxx) and Date of Birth (mm/dd/yyyy) as indicated and click Submit.  You will be taken to the next sign-up page. 5. Create a [employer] ID. This will be your [employer] login ID and cannot be changed, so think of one that is secure and easy to remember. 6. Create a [employer] password. You can change your password at any time. 7. Enter your Password Reset Question and Answer. This can be used at a later time if you forget your password. 8. Enter your e-mail address. You will receive e-mail notification when new information is available in [address] 9. Click Sign Up. You can now view and download portions of your medical record. 10. Click the Download Summary menu link to download a portable copy of your medical information. If you have questions, please visit the Frequently Asked Questions section of the [employer] website. Remember, [employer] is NOT to be used for urgent needs. For medical emergencies, dial 911. Now available from your iPhone and Android! Introducing Cade Mike As a [facility] patient, I wanted to make you aware of our electronic visit tool called C[place]. [employer] allows you to connect within minutes with a medical provider 24 hours a day, seven days a week via a mobile device or tablet or logging into a secure website from your computer. You can access Cade Mike from anywhere in the [place]. A virtual visit might be right for you when you have a simple condition and feel like you just dont want to get out of bed, or cant get away from work for an appointment, when your regular [facility] provider is not available (evenings, weekends or holidays), or when youre out of town and need minor care. Electronic visits cost only $49 and if the [employer] provider determines a prescription is needed to treat your condition, one can be electronically transmitted to a nearby pharmacy*. Please take a moment to enroll today if you have not already done so.   The enrollment process is free and takes just a few minutes. To enroll, please download the [facility] 24/7 clarita to your tablet or phone, or visit [email or web address] to enroll on your computer. And, as an [address] [place] [address] patient with a [employer] account, the results of your visits will be scanned into your electronic medical record and your primary care provider will be able to view the scanned results. We urge you to continue to see your regular [facility] provider for your ongoing medical care. And while your primary care provider may not be the one available when you seek a [facility] virtual visit, the peace of mind you get from getting a real diagnosis real time can be priceless. For more information on [employer], view our Frequently Asked Questions (FAQs) at [email or web address]. Sincerely, 
 
Cristy Reddy MD 
Chief Medical Officer Azeb Dixon *:  certain medications cannot be prescribed via [facility] Providers Seen During Your Hospitalization Provider Specialty Primary office phone Valdo Sheppard MD Orthopedic Surgery [phone] Immunizations Administered for This Admission Name Date  
 TB Skin Test (PPD) Intradermal 4/18/2018 Your Primary Care Physician (PCP) Primary Care Physician Office Phone Office Fax [facility] [phone] You are allergic to the following Allergen Reactions Ambien (Zolpidem) Anxiety Celebrex (Celecoxib) Rash Rozerem (Ramelteon) Unknown (comments) Sulfa (Sulfonamide Antibiotics) Rash Recent Documentation Height Weight BMI Smoking Status 1.88 m 112.4 kg 31.83 kg/m2 Former Smoker Emergency Contacts Name Discharge Info Relation Home Work Mobile Mckay Horton  Friend [5] [phone] Patient Belongings The following personal items are in your possession at time of discharge: Dental Appliances: None  Visual Aid: Glasses   Hearing Aids/Status: Does not own  Home Medications: None   Jewelry: None       Other Valuables: Cell Phone (pt states son has wallet ad he is ok to leave cell phone in suitcase in rom) Please provide this summary of care documentation to your next provider. Signatures-by signing, you are acknowledging that this After Visit Summary has been reviewed with you and you have received a copy. Patient Signature:  ____________________________________________________________ Date:  ____________________________________________________________  
  
[facility] Provider Signature:  ____________________________________________________________ Date:  ____________________________________________________________

## 2018-04-18 NOTE — PERIOP NOTES
TRANSFER - IN REPORT:    Verbal report received from [facility] on Daylene Lord  being received from 3 ortho for routine progression of care      Report consisted of patients Situation, Background, Assessment and   Recommendations(SBAR). Information from the following report(s) SBAR was reviewed with the receiving nurse. Opportunity for questions and clarification was provided. Assessment completed upon patients arrival to unit and care assumed.

## 2018-04-18 NOTE — ANESTHESIA PREPROCEDURE EVALUATION
Anesthetic History   No history of anesthetic complications            Review of Systems / Medical History  Pertinent labs reviewed    Pulmonary  Within defined limits                 Neuro/Psych   Within defined limits           Cardiovascular    Hypertension              Exercise tolerance: >4 METS     GI/Hepatic/Renal  Within defined limits              Endo/Other        Obesity and arthritis     Other Findings   Comments: H/o EtOH abuse         Physical Exam    Airway  Mallampati: III  TM Distance: 4 - 6 cm  Neck ROM: normal range of motion   Mouth opening: Normal     Cardiovascular          Murmur: Grade 2, Mitral area     Dental    Dentition: Poor dentition and Loose teeth  Comments: Multiple missing upper teeth, some slightly loose   Pulmonary  Breath sounds clear to auscultation               Abdominal  GI exam deferred       Other Findings            Anesthetic Plan    ASA: 2  Anesthesia type: spinal      Post-op pain plan if not by surgeon: peripheral nerve block single    Induction: Intravenous  Anesthetic plan and risks discussed with: Patient

## 2018-04-18 NOTE — PROGRESS NOTES
Care Management Interventions  Mode of Transport at Discharge: Self  Transition of Care Consult (CM Consult): [address]: Yes  Discharge Durable Medical Equipment: No  Physical Therapy Consult: Yes  Occupational Therapy Consult: Yes  Current Support Network: Lives with Spouse  Confirm Follow Up Transport: Family  Plan discussed with Pt/Family/Caregiver: Yes  Freedom of Choice Offered: Yes  Discharge Location  Discharge Placement: Home with home health  Patient is a 59y.o. year old male admitted for Right TKA . Patient lives with His spouse and plans to return home on discharge. Order received to arrange home health. Patient without preference towards agency. Referral sent to [facility]. Patient denies any equipment needs as he has a walker and raised toilet seat. Will follow until discharge.   Rojelio Cantor

## 2018-04-19 LAB
ANION GAP SERPL CALC-SCNC: 11 MMOL/L (ref 7–16)
BUN SERPL-MCNC: 18 MG/DL (ref 6–23)
CALCIUM SERPL-MCNC: 8 MG/DL (ref 8.3–10.4)
CHLORIDE SERPL-SCNC: 105 MMOL/L (ref 98–107)
CO2 SERPL-SCNC: 22 MMOL/L (ref 21–32)
CREAT SERPL-MCNC: 1.16 MG/DL (ref 0.8–1.5)
GLUCOSE SERPL-MCNC: 225 MG/DL (ref 65–100)
HGB BLD-MCNC: 13.1 G/DL (ref 13.6–17.2)
POTASSIUM SERPL-SCNC: 4.3 MMOL/L (ref 3.5–5.1)
SODIUM SERPL-SCNC: 138 MMOL/L (ref 136–145)

## 2018-04-19 PROCEDURE — 97110 THERAPEUTIC EXERCISES: CPT

## 2018-04-19 PROCEDURE — 65270000029 HC RM PRIVATE

## 2018-04-19 PROCEDURE — 97116 GAIT TRAINING THERAPY: CPT

## 2018-04-19 PROCEDURE — 85018 HEMOGLOBIN: CPT | Performed by: ORTHOPAEDIC SURGERY

## 2018-04-19 PROCEDURE — 80048 BASIC METABOLIC PNL TOTAL CA: CPT | Performed by: ORTHOPAEDIC SURGERY

## 2018-04-19 PROCEDURE — 74011250637 HC RX REV CODE- 250/637: Performed by: ORTHOPAEDIC SURGERY

## 2018-04-19 PROCEDURE — 97150 GROUP THERAPEUTIC PROCEDURES: CPT

## 2018-04-19 PROCEDURE — 36415 COLL VENOUS BLD VENIPUNCTURE: CPT | Performed by: ORTHOPAEDIC SURGERY

## 2018-04-19 PROCEDURE — 94760 N-INVAS EAR/PLS OXIMETRY 1: CPT

## 2018-04-19 PROCEDURE — 97535 SELF CARE MNGMENT TRAINING: CPT

## 2018-04-19 PROCEDURE — 94762 N-INVAS EAR/PLS OXIMTRY CONT: CPT

## 2018-04-19 PROCEDURE — 74011250636 HC RX REV CODE- 250/636: Performed by: ORTHOPAEDIC SURGERY

## 2018-04-19 RX ADMIN — ACETAMINOPHEN 1000 MG: 500 TABLET, FILM COATED ORAL at 17:29

## 2018-04-19 RX ADMIN — Medication 2 G: at 00:14

## 2018-04-19 RX ADMIN — HYDROMORPHONE HYDROCHLORIDE 2 MG: 2 TABLET ORAL at 15:57

## 2018-04-19 RX ADMIN — ACETAMINOPHEN 1000 MG: 500 TABLET, FILM COATED ORAL at 05:32

## 2018-04-19 RX ADMIN — SENNOSIDES AND DOCUSATE SODIUM 2 TABLET: 8.6; 5 TABLET ORAL at 08:00

## 2018-04-19 RX ADMIN — OXYCODONE HYDROCHLORIDE 10 MG: 10 TABLET, FILM COATED, EXTENDED RELEASE ORAL at 08:02

## 2018-04-19 RX ADMIN — ASPIRIN 81 MG: 81 TABLET, COATED ORAL at 08:00

## 2018-04-19 RX ADMIN — CYCLOBENZAPRINE HYDROCHLORIDE 5 MG: 10 TABLET, FILM COATED ORAL at 15:57

## 2018-04-19 RX ADMIN — ASPIRIN 81 MG: 81 TABLET, COATED ORAL at 20:33

## 2018-04-19 RX ADMIN — MELOXICAM 7.5 MG: 7.5 TABLET ORAL at 08:00

## 2018-04-19 RX ADMIN — HYDROMORPHONE HYDROCHLORIDE 2 MG: 2 TABLET ORAL at 20:32

## 2018-04-19 RX ADMIN — GABAPENTIN 100 MG: 100 CAPSULE ORAL at 08:02

## 2018-04-19 RX ADMIN — CYCLOBENZAPRINE HYDROCHLORIDE 5 MG: 10 TABLET, FILM COATED ORAL at 08:02

## 2018-04-19 RX ADMIN — HYDROMORPHONE HYDROCHLORIDE 2 MG: 2 TABLET ORAL at 12:07

## 2018-04-19 RX ADMIN — GABAPENTIN 100 MG: 100 CAPSULE ORAL at 17:29

## 2018-04-19 RX ADMIN — HYDROMORPHONE HYDROCHLORIDE 2 MG: 2 TABLET ORAL at 08:09

## 2018-04-19 RX ADMIN — KETOROLAC TROMETHAMINE 15 MG: 30 INJECTION, SOLUTION INTRAMUSCULAR at 16:01

## 2018-04-19 RX ADMIN — ACETAMINOPHEN 1000 MG: 500 TABLET, FILM COATED ORAL at 12:07

## 2018-04-19 RX ADMIN — ACETAMINOPHEN 1000 MG: 500 TABLET, FILM COATED ORAL at 00:14

## 2018-04-19 RX ADMIN — OXYCODONE HYDROCHLORIDE 10 MG: 10 TABLET, FILM COATED, EXTENDED RELEASE ORAL at 20:31

## 2018-04-19 RX ADMIN — ROSUVASTATIN CALCIUM 10 MG: 5 TABLET, FILM COATED ORAL at 20:31

## 2018-04-19 RX ADMIN — CYCLOBENZAPRINE HYDROCHLORIDE 5 MG: 10 TABLET, FILM COATED ORAL at 20:32

## 2018-04-19 RX ADMIN — KETOROLAC TROMETHAMINE 15 MG: 30 INJECTION, SOLUTION INTRAMUSCULAR at 05:32

## 2018-04-19 NOTE — PROGRESS NOTES
Problem: Mobility Impaired (Adult and Pediatric)  Goal: *Acute Goals and Plan of Care (Insert Text)  GOALS (1-4 days):  (1.)Mr. Bocanegra will move from supine to sit and sit to supine  in bed with SUPERVISION. (2.)Mr. Bocanegra will transfer from bed to chair and chair to bed with STAND BY ASSIST using the least restrictive device. 4/19  (3.)Mr. Bocanegra will ambulate with STAND BY ASSIST for 200 feet with the least restrictive device. 4/19  (4.)Mr. Bocanegra will ambulate up/down 3 steps with no railing with MINIMAL ASSIST with no device. (5.)Mr. Bocanegra will increase right knee ROM to 5°-80°.  ________________________________________________________________________________________________    PHYSICAL THERAPY Joint camp tKa: Daily Note, PM 4/19/2018  INPATIENT: Hospital Day: 2  Payor: Balinda Phalen / Plan: [employer] / Product Type: Commerical /      NAME/AGE/GENDER: Dennys Santiago is a 59 y.o. male   PRIMARY DIAGNOSIS:  Primary osteoarthritis of right knee [M17.11]   Procedure(s) and Anesthesia Type:     * RIGHT KNEE TOTAL ARTHROPLASTY - Spinal (Right)  ICD-10: Treatment Diagnosis:    · Pain in Right Knee (M25.561)  · Stiffness of Right Knee, Not elsewhere classified (M25.661)  · Difficulty in walking, Not elsewhere classified (R26.2)      ASSESSMENT:     Mr. Bocanegra presents with impaired strength & mobility s/p right TKA. Pt also had decreased stability during out of bed activity. Pt will benafit from up therapy to help restore safe function prior to returning home with cargiver. 4/19 am he supine upon arrival.  He performs exercises in the bed without any problems. He has a good quad contraction and can do a SLR without any help. He increase his distance using RW with SBA. He will remain in the chair and come to group this afternoon. 4/19 pm he walk to the gym and back using RW with SBA and no LOB. While int he gym he performs exercises without any problems.       This section established at most recent assessment   PROBLEM LIST (Impairments causing functional limitations):  1. Decreased Strength  2. Decreased ADL/Functional Activities  3. Decreased Transfer Abilities  4. Decreased Ambulation Ability/Technique  5. Decreased Balance  6. Increased Pain  7. Decreased Activity Tolerance  8. Decreased Flexibility/Joint Mobility  9. Decreased [place] with Home Exercise Program   INTERVENTIONS PLANNED: (Benefits and precautions of physical therapy have been discussed with the patient.)  1. Bed Mobility  2. Gait Training  3. Home Exercise Program (HEP)  4. Therapeutic Exercise/Strengthening  5. Transfer Training  6. Range of Motion: active/assisted/passive  7. Therapeutic Activities  8. Group Therapy     TREATMENT PLAN: Frequency/Duration: Follow patient BID for duration of hospital stay to address above goals. Rehabilitation Potential For Stated Goals: Good     RECOMMENDED REHABILITATION/EQUIPMENT: (at time of discharge pending progress): Continue Skilled Therapy and Home Health: Physical Therapy. HISTORY:   History of Present Injury/Illness (Reason for Referral): The patient has end stage arthritis of the right knee. The patient was evaluated and examined during a consultation prior to today. The patient complains of knee pain with activities, reports pain as mostly occurring along the joint lines, reports stiffness of the knee with prolonged inactivity, and swelling/pain at the end of the day and after increased physical activity. The pain affects the patients activities of daily living and quality of life. The patient has attempted and exhausted conservative treatment. There have been no changes to the patient's orthopedic condition since the last office visit. Past Medical History/Comorbidities:   Mr. Bocanegra  has a past medical history of Alcohol dependence, binge pattern ([facility] [address] (9/16/2002); Arthritis; Back pain (4/13/10); CAD (coronary artery disease) (04/30/2010);  Chest pain (12/16/13); Cocaine abuse (1995); Degenerative arthritis of knee (9/10/09); Frontal sinusitis (6/29/10); Hyperlipidemia (03/28/2014); Knee effusion (6/23/09); Knee pain (6/23/09); Tobacco use (6/29/10); Unspecified essential hypertension (04/11/2014); [place]lo [place] (04/11/14); and Viral warts, unspecified (4/18/14). Mr. Bianca Franklin  has a past surgical history that includes hx appendectomy (1982); hx heent; hx knee arthroscopy (Bilateral); and hx orthopaedic. Social History/Living Environment:   Home Environment: Private residence  # Steps to Enter: 3  Rails to Enter: No  One/Two Story Residence: One story  Living Alone: Yes  Support Systems: Family member(s)  Patient Expects to be Discharged to[de-identified] Private residence  Current DME Used/Available at Home: [address], [address] chair  Tub or Shower Type: Shower  Prior Level of Function/Work/Activity:  Pt was independent without an assistive device prior to this admission   Number of Personal Factors/Comorbidities that affect the Plan of Care: 3+: HIGH COMPLEXITY   EXAMINATION:   Most Recent Physical Functioning:               RLE AROM  R Knee Flexion: 96  R Knee Extension: 6            Bed Mobility  Supine to Sit: Contact guard assistance  Sit to Supine:  (left up in chair)    Transfers  Sit to Stand: Stand-by assistance  Stand to Sit: Stand-by assistance    Balance  Sitting: Intact  Standing: With support              Weight Bearing Status  Right Side Weight Bearing: As tolerated  Distance (ft): 104 Feet (ft) (x 2)  Ambulation - Level of Assistance: Stand-by assistance  Assistive Device: Walker, rolling  Speed/Mckenzie: Shuffled; Slow  Step Length: Left shortened  Stance: Right decreased  Gait Abnormalities: Decreased step clearance        Braces/Orthotics: none    Right Knee Cold  Type: Cryocuff      Body Structures Involved:  1. Joints  2. Muscles Body Functions Affected:  1. Sensory/Pain  2. Movement Related Activities and Participation Affected:  1.  General Tasks and Demands  2. Mobility   Number of elements that affect the Plan of Care: 4+: HIGH COMPLEXITY   CLINICAL PRESENTATION:   Presentation: Stable and uncomplicated: LOW COMPLEXITY   CLINICAL DECISION MAKING:   [facility] AM-PAC 6 Clicks   Basic Mobility Inpatient Short Form  How much difficulty does the patient currently have. .. Unable A Lot A Little None   1. Turning over in bed (including adjusting bedclothes, sheets and blankets)? [] 1   [] 2   [x] 3   [] 4   2. Sitting down on and standing up from a chair with arms ( e.g., wheelchair, bedside commode, etc.)   [] 1   [] 2   [x] 3   [] 4   3. Moving from lying on back to sitting on the side of the bed? [] 1   [] 2   [x] 3   [] 4   How much help from another person does the patient currently need. .. Total A Lot A Little None   4. Moving to and from a bed to a chair (including a wheelchair)? [] 1   [] 2   [x] 3   [] 4   5. Need to walk in hospital room? [] 1   [] 2   [x] 3   [] 4   6. Climbing 3-5 steps with a railing? [x] 1   [] 2   [] 3   [] 4   © 2007, Trustees of [facility], under license to [employer]). All rights reserved        Score:  Initial: 16 Most Recent: X (Date: -- )    Interpretation of Tool:  Represents activities that are increasingly more difficult (i.e. Bed mobility, Transfers, Gait). Score 24 23 22-20 19-15 14-10 9-7 6     Modifier CH CI CJ CK CL CM CN      ? Mobility - Walking and Moving Around:     - CURRENT STATUS: CK - 40%-59% impaired, limited or restricted    - GOAL STATUS: CJ - 20%-39% impaired, limited or restricted    - D/C STATUS:  ---------------To be determined---------------  Payor: GENERIC COMMERCIAL / Plan: [employer] GENERIC COMMERCIAL / Product Type: Commerical /      Medical Necessity:     · Patient is expected to demonstrate progress in strength, range of motion, balance, coordination and functional technique to decrease assistance required with bed mobility, transfers & gait.   Reason for Services/Other Comments:  · Patient continues to require skilled intervention due to pt not independent with functional mobility. Use of outcome tool(s) and clinical judgement create a POC that gives a: Clear prediction of patient's progress: LOW COMPLEXITY            TREATMENT:   (In addition to Assessment/Re-Assessment sessions the following treatments were rendered)     Pre-treatment Symptoms/Complaints:  none  Pain: Initial: visual scale  Pain Intensity 1: 0 (0/10 after therapy)  Post Session:       Gait Training (15 Minutes):  Gait training to improve and/or restore physical functioning as related to mobility. Ambulated 104 Feet (ft) (x 2) with Stand-by assistance using a Walker, rolling and minimal   related to their knee position and motion to promote proper body alignment. Therapeutic Exercise: (45 Minutes (group therapy)):  Exercises per grid below to improve strength. Date:  4/19   Date:   Date:     ACTIVITY/EXERCISE AM PM AM PM AM PM   GROUP THERAPY  []  [x]  []  []  []  []   Ankle Pumps 15 15       Quad Sets 15 15       Gluteal Sets 15 15       Hip ABd/ADduction 15 15       Straight Leg Raises 15 15       Knee Slides 15 15       Short Arc Quads 15 15       Long Arc Quads         Chair Slides  15                B = bilateral; AA = active assistive; A = active; P = passive      Treatment/Session Assessment:     Response to Treatment:  Tolerated therapy well    Education:  [] Home Exercises  [x] Fall Precautions  [] Hip Precautions [] D/C Instruction Review  [] Knee/Hip Prosthesis Review  [x] Walker Management/Safety [] Adaptive Equipment as Needed       Interdisciplinary Collaboration:   o Registered Nurse    After treatment position/precautions:   o Up in chair  o Bed/Chair-wheels locked  o Bed in low position  o Call light within reach  o RN notified    Compliance with Program/Exercises: Will assess as treatment progresses.     Recommendations/Intent for next treatment session:  Treatment next visit will focus on increasing Mr. Reji Del Cid independence with bed mobility, transfers, gait training, strength/ROM exercises, modalities for pain, and patient education.       Total Treatment Duration:  PT Patient Time In/Time Out  Time In: 1300  Time Out: 9[address] Saranya Minor, PTA

## 2018-04-19 NOTE — PROGRESS NOTES
Problem: Mobility Impaired (Adult and Pediatric)  Goal: *Acute Goals and Plan of Care (Insert Text)  GOALS (1-4 days):  (1.)Mr. Bocanegra will move from supine to sit and sit to supine  in bed with SUPERVISION. (2.)Mr. Bocanegra will transfer from bed to chair and chair to bed with STAND BY ASSIST using the least restrictive device. (3.)Mr. Bocanegra will ambulate with STAND BY ASSIST for 200 feet with the least restrictive device. (4.)Mr. Bocanegra will ambulate up/down 3 steps with no railing with MINIMAL ASSIST with no device. (5.)Mr. Bocanegra will increase right knee ROM to 5°-80°.  ________________________________________________________________________________________________    PHYSICAL THERAPY [facility] camp tKa: Daily Note, AM 4/19/2018  INPATIENT: Hospital Day: 2  Payor: Sherie Chilel / Plan: Mary Michelle / Product Type: Commerical /      NAME/AGE/GENDER: Lara Wright is a 59 y.o. male   PRIMARY DIAGNOSIS:  Primary osteoarthritis of right knee [M17.11]   Procedure(s) and Anesthesia Type:     * RIGHT KNEE TOTAL ARTHROPLASTY - Spinal (Right)  ICD-10: Treatment Diagnosis:    · Pain in Right Knee (M25.561)  · Stiffness of Right Knee, Not elsewhere classified (M25.661)  · Difficulty in walking, Not elsewhere classified (R26.2)      ASSESSMENT:     Mr. Bocanegra presents with impaired strength & mobility s/p right TKA. Pt also had decreased stability during out of bed activity. Pt will benafit from up therapy to help restore safe function prior to returning home with cargiver. 4/19 am he supine upon arrival.  He performs exercises in the bed without any problems. He has a good quad contraction and can do a SLR without any help. He increase his distance using RW with SBA. He will remain in the chair and come to group this afternoon. This section established at most recent assessment   PROBLEM LIST (Impairments causing functional limitations):  1. Decreased Strength  2.  Decreased ADL/Functional Activities  3. Decreased Transfer Abilities  4. Decreased Ambulation Ability/Technique  5. Decreased Balance  6. Increased Pain  7. Decreased Activity Tolerance  8. Decreased Flexibility/Joint Mobility  9. Decreased [place] with Home Exercise Program   INTERVENTIONS PLANNED: (Benefits and precautions of physical therapy have been discussed with the patient.)  1. Bed Mobility  2. Gait Training  3. Home Exercise Program (HEP)  4. Therapeutic Exercise/Strengthening  5. Transfer Training  6. Range of Motion: active/assisted/passive  7. Therapeutic Activities  8. Group Therapy     TREATMENT PLAN: Frequency/Duration: Follow patient BID for duration of hospital stay to address above goals. Rehabilitation Potential For Stated Goals: Good     RECOMMENDED REHABILITATION/EQUIPMENT: (at time of discharge pending progress): Continue Skilled Therapy and Home Health: Physical Therapy. HISTORY:   History of Present Injury/Illness (Reason for Referral): The patient has end stage arthritis of the right knee. The patient was evaluated and examined during a consultation prior to today. The patient complains of knee pain with activities, reports pain as mostly occurring along the joint lines, reports stiffness of the knee with prolonged inactivity, and swelling/pain at the end of the day and after increased physical activity. The pain affects the patients activities of daily living and quality of life. The patient has attempted and exhausted conservative treatment. There have been no changes to the patient's orthopedic condition since the last office visit. Past Medical History/Comorbidities:   Mr. Segundo Goldman  has a past medical history of Alcohol dependence, binge pattern ([facility] [address] (9/16/2002); Arthritis; Back pain (4/13/10); CAD (coronary artery disease) (04/30/2010); Chest pain (12/16/13); Cocaine abuse (1995); Degenerative arthritis of knee (9/10/09); Frontal sinusitis (6/29/10); Hyperlipidemia (03/28/2014);  Knee effusion (6/23/09); Knee pain (6/23/09); Tobacco use (6/29/10); Unspecified essential hypertension (04/11/2014); [place] (04/11/14); and Viral warts, unspecified (4/18/14). Mr. Liberty Gutierrez  has a past surgical history that includes hx appendectomy (1982); hx heent; hx knee arthroscopy (Bilateral); and hx orthopaedic. Social History/Living Environment:   Home Environment: Private residence  # Steps to Enter: 3  Rails to Enter: No  One/Two Story Residence: One story  Living Alone: Yes  Support Systems: Family member(s)  Patient Expects to be Discharged to[de-identified] Private residence  Current DME Used/Available at Home: [address] [employer], [address] chair  Tub or Shower Type: Shower  Prior Level of Function/Work/Activity:  Pt was independent without an assistive device prior to this admission   Number of Personal Factors/Comorbidities that affect the Plan of Care: 3+: HIGH COMPLEXITY   EXAMINATION:   Most Recent Physical Functioning:                            Bed Mobility  Supine to Sit: Contact guard assistance  Sit to Supine:  (left up in chair)    Transfers  Sit to Stand: Contact guard assistance  Stand to Sit: Contact guard assistance                   Weight Bearing Status  Right Side Weight Bearing: As tolerated  Distance (ft): 40 Feet (ft)  Ambulation - Level of Assistance: Contact guard assistance  Assistive Device: Walker, rolling  Speed/Mckenzie: Shuffled; Slow  Step Length: Left shortened;Right shortened  Stance: Right decreased  Gait Abnormalities: Decreased step clearance        Braces/Orthotics: none    Right Knee Cold  Type: Cryocuff      Body Structures Involved:  1. Joints  2. Muscles Body Functions Affected:  1. Sensory/Pain  2. Movement Related Activities and Participation Affected:  1. General Tasks and Demands  2.  Mobility   Number of elements that affect the Plan of Care: 4+: HIGH COMPLEXITY   CLINICAL PRESENTATION:   Presentation: Stable and uncomplicated: LOW COMPLEXITY   CLINICAL DECISION MAKING:   [employer] AM-PAC 6 Clicks   Basic Mobility Inpatient Short Form  How much difficulty does the patient currently have. .. Unable A Lot A Little None   1. Turning over in bed (including adjusting bedclothes, sheets and blankets)? [] 1   [] 2   [x] 3   [] 4   2. Sitting down on and standing up from a chair with arms ( e.g., wheelchair, bedside commode, etc.)   [] 1   [] 2   [x] 3   [] 4   3. Moving from lying on back to sitting on the side of the bed? [] 1   [] 2   [x] 3   [] 4   How much help from another person does the patient currently need. .. Total A Lot A Little None   4. Moving to and from a bed to a chair (including a wheelchair)? [] 1   [] 2   [x] 3   [] 4   5. Need to walk in hospital room? [] 1   [] 2   [x] 3   [] 4   6. Climbing 3-5 steps with a railing? [x] 1   [] 2   [] 3   [] 4   © 2007, Trustees of [address] [place], [place] [id number], under license to [employer]. All rights reserved        Score:  Initial: 16 Most Recent: X (Date: -- )    Interpretation of Tool:  Represents activities that are increasingly more difficult (i.e. Bed mobility, Transfers, Gait). Score 24 23 22-20 19-15 14-10 9-7 6     Modifier CH CI CJ CK CL CM CN      ? Mobility - Walking and Moving Around:     - CURRENT STATUS: CK - 40%-59% impaired, limited or restricted    - GOAL STATUS: CJ - 20%-39% impaired, limited or restricted    - D/C STATUS:  ---------------To be determined---------------  Payor: GENERIC COMMERCIAL / Plan: [facility] GENERIC COMMERCIAL / Product Type: Commerical /      Medical Necessity:     · Patient is expected to demonstrate progress in strength, range of motion, balance, coordination and functional technique to decrease assistance required with bed mobility, transfers & gait. Reason for Services/Other Comments:  · Patient continues to require skilled intervention due to pt not independent with functional mobility.    Use of outcome tool(s) and clinical judgement create a POC that gives a: Clear prediction of patient's progress: LOW COMPLEXITY            TREATMENT:   (In addition to Assessment/Re-Assessment sessions the following treatments were rendered)     Pre-treatment Symptoms/Complaints:  none  Pain: Initial: visual scale  Pain Intensity 1: 0  Post Session:       Gait Training (15 Minutes):  Gait training to improve and/or restore physical functioning as related to mobility. Ambulated 40 Feet (ft) with Contact guard assistance using a Walker, rolling and minimal   related to their knee position and motion to promote proper body alignment. Therapeutic Exercise: (15 Minutes):  Exercises per grid below to improve strength. Date:  4/19   Date:   Date:     ACTIVITY/EXERCISE AM PM AM PM AM PM   GROUP THERAPY  []  []  []  []  []  []   Ankle Pumps 15        Quad Sets 15        Gluteal Sets 15        Hip ABd/ADduction 15        Straight Leg Raises 15        Knee Slides 15        Short Arc Quads 15        Long Arc Quads         Chair Slides                  B = bilateral; AA = active assistive; A = active; P = passive      Treatment/Session Assessment:     Response to Treatment:  Tolerated session well    Education:  [] Home Exercises  [x] Fall Precautions  [] Hip Precautions [] D/C Instruction Review  [] Knee/Hip Prosthesis Review  [x] Walker Management/Safety [] Adaptive Equipment as Needed       Interdisciplinary Collaboration:   o Registered Nurse    After treatment position/precautions:   o Up in chair  o Bed/Chair-wheels locked  o Bed in low position  o Call light within reach  o RN notified    Compliance with Program/Exercises: Will assess as treatment progresses. Recommendations/Intent for next treatment session:  Treatment next visit will focus on increasing Mr. Aline Ramachandran independence with bed mobility, transfers, gait training, strength/ROM exercises, modalities for pain, and patient education.       Total Treatment Duration:  PT Patient Time In/Time Out  Time In: 0700  Time Out: 0730    Roz CASTANEDA Candelaria Brink, PTA

## 2018-04-19 NOTE — PROGRESS NOTES
Problem: Self Care Deficits Care Plan (Adult)  Goal: *Acute Goals and Plan of Care (Insert Text)  GOALS:   DISCHARGE GOALS (in preparation for going home/rehab):  3 days  1. Mr. Jayesh Jon will perform one lower body dressing activity with minimal assistance required to demonstrate improved functional mobility and safety. GOAL MET 4/19/2018  2. Mr. Jayesh Jon will perform one lower body bathing activity with minimal assistance required to demonstrate improved functional mobility and safety. GOAL MET 4/19/2018  3. Mr. Jayesh Jon will perform toileting/toilet transfer with contact guard assistance to demonstrate improved functional mobility and safety. GOAL MET 4/19/2018  4. Mr. Jayesh Jon will perform shower transfer with contact guard assistance to demonstrate improved functional mobility and safety. GOAL MET 4/19/2018    JOINT CAMP OCCUPATIONAL THERAPY TKA: Daily Note, Discharge, Treatment Day: 1st and AM 4/19/2018  INPATIENT: Hospital Day: 2  Payor: Diane Harley / Plan: Didi Tavera / Product Type: Commerical /      NAME/AGE/GENDER: Meghan Goss is a 59 y.o. male   PRIMARY DIAGNOSIS:  Primary osteoarthritis of right knee [M17.11]   Procedure(s) and Anesthesia Type:     * RIGHT KNEE TOTAL ARTHROPLASTY - Spinal (Right)  ICD-10: Treatment Diagnosis:    · Pain in Right Knee (M25.561)  · Stiffness of Right Knee, Not elsewhere classified (M25.661)  · Other lack of cordination (R27.8)      ASSESSMENT:      Mr. Jayesh Jon is s/p R TKA and presents with decreased weight bearing on R LE and decreased independence with functional mobility and activities of daily living. Patient completed shower and dressing as charter below in ADL grid and is ambulating with rolling walker with supervision. Patient has met 3/4 goals and plans to return home with good family support. Family able to provide patient with appropriate level of assistance at this time.   OT reviewed safety precautions throughout session and therapy schedule for the remainder of today. Patient instructed to call for assistance when needing to get up from recliner and all needs in reach. Patient verbalized understanding of call light. This section established at most recent assessment   PROBLEM LIST (Impairments causing functional limitations):  1. Decreased Strength  2. Decreased ADL/Functional Activities  3. Decreased Transfer Abilities  4. Increased Pain  5. Increased Fatigue  6. Decreased Flexibility/Joint Mobility  7. Decreased Knowledge of Precautions   INTERVENTIONS PLANNED: (Benefits and precautions of occupational therapy have been discussed with the patient.)  1. Activities of daily living training  2. Adaptive equipment training  3. Balance training  4. Clothing management  5. Donning&doffing training  6. Theraputic activity     TREATMENT PLAN: Frequency/Duration: Follow patient 1 time to address above goals. Rehabilitation Potential For Stated Goals: Good     RECOMMENDED REHABILITATION/EQUIPMENT: (at time of discharge pending progress): Continue Skilled Therapy and Home Health: Physical Therapy. OCCUPATIONAL PROFILE AND HISTORY:   History of Present Injury/Illness (Reason for Referral): Pt presents this date s/p (right) TKA. Past Medical History/Comorbidities:   Mr. Bocanegra  has a past medical history of Alcohol dependence, binge pattern ([facility] [address] (9/16/2002); Arthritis; Back pain (4/13/10); CAD (coronary artery disease) (04/30/2010); Chest pain (12/16/13); Cocaine abuse (1995); Degenerative arthritis of knee (9/10/09); Frontal sinusitis (6/29/10); Hyperlipidemia (03/28/2014); Knee effusion (6/23/09); Knee pain (6/23/09); Tobacco use (6/29/10); Unspecified essential hypertension (04/11/2014); Seleta [place] (04/11/14); and Viral warts, unspecified (4/18/14). Mr. Bocanegra  has a past surgical history that includes hx appendectomy (1982); hx heent; hx knee arthroscopy (Bilateral); and hx orthopaedic.   Social History/Living Environment:   Home Environment: Private residence  # Steps to Enter: 3  Rails to [facility]: No  One/Two Story Residence: One story  Living Alone: Yes  Support Systems: Family member(s)  Patient Expects to be Discharged to[de-identified] Private residence  Current DME Used/Available at Home: [address] [facility], [address] chair  Tub or Shower Type: Shower  Prior Level of Function/Work/Activity:  Independent and working full time 10 hour days     Number of Personal Factors/Comorbidities that affect the Plan of Care: Brief history (0):  LOW COMPLEXITY   ASSESSMENT OF OCCUPATIONAL PERFORMANCE[de-identified]   Most Recent Physical Functioning:   Balance  Sitting: Intact  Standing: With support                              Mental Status  Neurologic State: Alert  Orientation Level: Oriented X4  Cognition: Appropriate decision making; Appropriate for age attention/concentration  Perception: Appears intact  Perseveration: No perseveration noted  Safety/Judgement: Fall prevention                Basic ADLs (From Assessment) Complex ADLs (From Assessment)   Basic ADL  Feeding: Setup  Oral Facial Hygiene/Grooming: Supervision  Bathing: Moderate assistance  Type of Bath: Full, Shower, Basin/Soap/Water  Upper Body Dressing: Supervision  Lower Body Dressing: Moderate assistance  Toileting:  Total assistance (catheter)     Grooming/Bathing/Dressing Activities of Daily Living     Cognitive Retraining  Safety/Judgement: Fall prevention   Upper Body Bathing  Bathing Assistance: Modified independent  Position Performed: Standing;Seated in chair  Adaptive Equipment: Shower chair     Lower Body Bathing  Bathing Assistance: Modified independent  Perineal  : Independent  Lower Body : Modified independent  Adaptive Equipment: Shower chair     Upper [facility]: [address] [place] [address] Transfer: Supervision   Lower Body Dressing Assistance  Dressing Assistance: Supervision/set up  Underpants: Supervision/set-up  Pants With Elastic Waist: Supervision/set-up  Socks: Supervision/set-up  Cues: Don;Doff;Physical assistance Bed/Mat Mobility  Supine to Sit: Contact guard assistance  Sit to Supine:  (left up in chair)  Sit to Stand: Supervision         Physical Skills Involved:  1. Range of Motion  2. Balance  3. Strength Cognitive Skills Affected (resulting in the inability to perform in a timely and safe manner): 1. none Psychosocial Skills Affected:  1. none   Number of elements that affect the Plan of Care: 1-3:  LOW COMPLEXITY   CLINICAL DECISION MAKIN[address] [place] [address] AM-PAC 6 Clicks   Daily Activity Inpatient Short Form  How much help from another person does the patient currently need. .. Total A Lot A Little None   1. Putting on and taking off regular lower body clothing? [] 1   [x] 2   [] 3   [] 4   2. Bathing (including washing, rinsing, drying)? [] 1   [x] 2   [] 3   [] 4   3. Toileting, which includes using toilet, bedpan or urinal?   [x] 1   [] 2   [] 3   [] 4   4. Putting on and taking off regular upper body clothing? [] 1   [] 2   [x] 3   [] 4   5. Taking care of personal grooming such as brushing teeth? [] 1   [] 2   [x] 3   [] 4   6. Eating meals? [] 1   [] 2   [] 3   [x] 4   © , Trustees of [address] [place] [address], under license to [email or web address]. All rights reserved     Score:  Initial: 15 Most Recent: X (Date: -- )    Interpretation of Tool:  Represents activities that are increasingly more difficult (i.e. Bed mobility, Transfers, Gait). Score 24 23 22-20 19-15 14-10 9-7 6     Modifier CH CI CJ CK CL CM CN      ?  Self Care:     - CURRENT STATUS: CK - 40%-59% impaired, limited or restricted    - GOAL STATUS: CJ - 20%-39% impaired, limited or restricted    - D/C STATUS:  ---------------To be determined---------------  Payor: GENERIC COMMERCIAL / Plan: [facility] GENERIC COMMERCIAL / Product Type: Commerical /      ·                 TREATMENT:   (In addition to Assessment/Re-Assessment sessions the following treatments were rendered)     Pre-treatment Symptoms/Complaints:    Pain: Initial:   Pain Intensity 1: 0  Post Session:  1/10 rest, iceman in place     Self Care: (25): Procedure(s) (per grid) utilized to improve and/or restore self-care/home management as related to dressing and bathing. Required min verbal and manual cueing to facilitate activities of daily living skills. Treatment/Session Assessment:     Response to Treatment:  Tolerated well, very motivated. Education:  [] Home Exercises  [x] Fall Precautions  [] Hip Precautions [] Going Home Video  [x] Knee/Hip Prosthesis Review  [x] Walker Management/Safety [x] Adaptive Equipment as Needed       Interdisciplinary Collaboration:   o Physical Therapist  o Certified Occupational Therapy Assistant  o Registered Nurse    After treatment position/precautions:   o Up in chair  o Bed/Chair-wheels locked  o Bed in low position  o Call light within reach  o RN notified     Compliance with Program/Exercises: compliant all of the time. Pt doing well all goals met and will do well at home with support from family. Patient will be discharged home with home health PT. No further Occupation Therapy warranted, will discharge Occupational Therapy services.       Total Treatment Duration:  OT Patient Time In/Time Out  Time In: 0840  Time Out: Soraya Zavaleta

## 2018-04-19 NOTE — PROGRESS NOTES
Shift assessment complete. Pt resting in bed. Call light within reach. Bed low to ground and wheels locked. Pt able to dosi/plantar flex bilaterally with +2 pedal pulses. Dressing is dry and intact. Ambulates to bathroom with walker. IS at bedside. No needs at this time.

## 2018-04-19 NOTE — PROGRESS NOTES
04/19/18 0745   Oxygen Therapy   O2 Sat (%) 93 %   Pulse via Oximetry 71 beats per minute   O2 Device Room air   O2 Flow Rate (L/min) 0 l/min   Incentive Spirometry Treatment   Actual Volume (ml) 3750 ml   Number of Attempts Greater than 3

## 2018-04-19 NOTE — PROGRESS NOTES
04/18/18 2028   Oxygen Therapy   O2 Sat (%) 91 %   Pulse via Oximetry 74 beats per minute   O2 Device Room air  (Placed on NC 2lpm for tonight. )   O2 Flow Rate (L/min) 0 l/min   Patient placed on continuous sat monitor # 7 and  2 L NC HS per protocol. Monitor history deleted prior to placing on patient.

## 2018-04-20 VITALS
OXYGEN SATURATION: 97 % | SYSTOLIC BLOOD PRESSURE: 124 MMHG | BODY MASS INDEX: 31.82 KG/M2 | WEIGHT: 247.9 LBS | TEMPERATURE: 98.3 F | RESPIRATION RATE: 18 BRPM | HEART RATE: 66 BPM | DIASTOLIC BLOOD PRESSURE: 83 MMHG | HEIGHT: 74 IN

## 2018-04-20 LAB — HGB BLD-MCNC: 12.7 G/DL (ref 13.6–17.2)

## 2018-04-20 PROCEDURE — 97150 GROUP THERAPEUTIC PROCEDURES: CPT

## 2018-04-20 PROCEDURE — 97116 GAIT TRAINING THERAPY: CPT

## 2018-04-20 PROCEDURE — 94760 N-INVAS EAR/PLS OXIMETRY 1: CPT

## 2018-04-20 PROCEDURE — 36415 COLL VENOUS BLD VENIPUNCTURE: CPT | Performed by: ORTHOPAEDIC SURGERY

## 2018-04-20 PROCEDURE — 85018 HEMOGLOBIN: CPT | Performed by: ORTHOPAEDIC SURGERY

## 2018-04-20 PROCEDURE — 74011250637 HC RX REV CODE- 250/637: Performed by: ORTHOPAEDIC SURGERY

## 2018-04-20 RX ADMIN — Medication 10 ML: at 06:00

## 2018-04-20 RX ADMIN — ACETAMINOPHEN 1000 MG: 500 TABLET, FILM COATED ORAL at 00:16

## 2018-04-20 RX ADMIN — CYCLOBENZAPRINE HYDROCHLORIDE 5 MG: 10 TABLET, FILM COATED ORAL at 08:20

## 2018-04-20 RX ADMIN — ACETAMINOPHEN 1000 MG: 500 TABLET, FILM COATED ORAL at 05:26

## 2018-04-20 RX ADMIN — OXYCODONE HYDROCHLORIDE 10 MG: 10 TABLET, FILM COATED, EXTENDED RELEASE ORAL at 08:19

## 2018-04-20 RX ADMIN — MELOXICAM 7.5 MG: 7.5 TABLET ORAL at 08:20

## 2018-04-20 RX ADMIN — ASPIRIN 81 MG: 81 TABLET, COATED ORAL at 08:19

## 2018-04-20 RX ADMIN — SENNOSIDES AND DOCUSATE SODIUM 2 TABLET: 8.6; 5 TABLET ORAL at 08:19

## 2018-04-20 RX ADMIN — HYDROMORPHONE HYDROCHLORIDE 2 MG: 2 TABLET ORAL at 00:16

## 2018-04-20 RX ADMIN — HYDROMORPHONE HYDROCHLORIDE 2 MG: 2 TABLET ORAL at 08:22

## 2018-04-20 RX ADMIN — HYDROMORPHONE HYDROCHLORIDE 2 MG: 2 TABLET ORAL at 05:26

## 2018-04-20 RX ADMIN — GABAPENTIN 100 MG: 100 CAPSULE ORAL at 08:20

## 2018-04-20 NOTE — PROGRESS NOTES
Patient is alert and oriented x 4. Rates pain 6/10 to right knee. PRN pain meds given. Physical assessment done. No areas of concern noted. Call light within reach.

## 2018-04-20 NOTE — PROGRESS NOTES
Problem: Mobility Impaired (Adult and Pediatric)  Goal: *Acute Goals and Plan of Care (Insert Text)  GOALS (1-4 days):  (1.)Mr. Bocanegra will move from supine to sit and sit to supine  in bed with SUPERVISION. 4/20  (2.)Mr. Bocanegra will transfer from bed to chair and chair to bed with STAND BY ASSIST using the least restrictive device. 4/19  (3.)Mr. Bocanegra will ambulate with STAND BY ASSIST for 200 feet with the least restrictive device. 4/19  (4.)Mr. Bocanegra will ambulate up/down 3 steps with no railing with MINIMAL ASSIST with no device. 4/20  (5.)Mr. Bocanegra will increase right knee ROM to 5°-80°.4/20  ________________________________________________________________________________________________    PHYSICAL THERAPY [facility]Ka: Daily Note, AM 4/20/2018  INPATIENT: Hospital Day: 3  Payor: Beatrice Coleman / Plan: Ania Michel / Product Type: Commerical /      NAME/AGE/GENDER: Diamante Reich is a 59 y.o. male   PRIMARY DIAGNOSIS:  Primary osteoarthritis of right knee [M17.11]   Procedure(s) and Anesthesia Type:     * RIGHT KNEE TOTAL ARTHROPLASTY - Spinal (Right)  ICD-10: Treatment Diagnosis:    · Pain in Right Knee (M25.561)  · Stiffness of Right Knee, Not elsewhere classified (M25.661)  · Difficulty in walking, Not elsewhere classified (R26.2)      ASSESSMENT:     Mr. Bocanegra presents with impaired strength & mobility s/p right TKA. Pt also had decreased stability during out of bed activity. Pt will benafit from up therapy to help restore safe function prior to returning home with cargiver. 4/19 am he supine upon arrival.  He performs exercises in the bed without any problems. He has a good quad contraction and can do a SLR without any help. He increase his distance using RW with SBA. He will remain in the chair and come to group this afternoon. 4/19 pm he walk to the gym and back using RW with SBA and no LOB. While int he gym he performs exercises without any problems.   4/20 am he walk to the gym and back without LOB. While in the gym he performs exercises without any problems. He is ready for D/C. This section established at most recent assessment   PROBLEM LIST (Impairments causing functional limitations):  1. Decreased Strength  2. Decreased ADL/Functional Activities  3. Decreased Transfer Abilities  4. Decreased Ambulation Ability/Technique  5. Decreased Balance  6. Increased Pain  7. Decreased Activity Tolerance  8. Decreased Flexibility/Joint Mobility  9. Decreased [place] with Home Exercise Program   INTERVENTIONS PLANNED: (Benefits and precautions of physical therapy have been discussed with the patient.)  1. Bed Mobility  2. Gait Training  3. Home Exercise Program (HEP)  4. Therapeutic Exercise/Strengthening  5. Transfer Training  6. Range of Motion: active/assisted/passive  7. Therapeutic Activities  8. Group Therapy     TREATMENT PLAN: Frequency/Duration: Follow patient BID for duration of hospital stay to address above goals. Rehabilitation Potential For Stated Goals: Good     RECOMMENDED REHABILITATION/EQUIPMENT: (at time of discharge pending progress): Continue Skilled Therapy and Home Health: Physical Therapy. HISTORY:   History of Present Injury/Illness (Reason for Referral): The patient has end stage arthritis of the right knee. The patient was evaluated and examined during a consultation prior to today. The patient complains of knee pain with activities, reports pain as mostly occurring along the joint lines, reports stiffness of the knee with prolonged inactivity, and swelling/pain at the end of the day and after increased physical activity. The pain affects the patients activities of daily living and quality of life. The patient has attempted and exhausted conservative treatment. There have been no changes to the patient's orthopedic condition since the last office visit.   Past Medical History/Comorbidities:   Mr. Ananda Gilbert  has a past medical history of Alcohol dependence, binge pattern ([facility] [address] (9/16/2002); Arthritis; Back pain (4/13/10); CAD (coronary artery disease) (04/30/2010); Chest pain (12/16/13); Cocaine abuse (1995); Degenerative arthritis of knee (9/10/09); Frontal sinusitis (6/29/10); Hyperlipidemia (03/28/2014); Knee effusion (6/23/09); Knee pain (6/23/09); Tobacco use (6/29/10); Unspecified essential hypertension (04/11/2014); Janus Ek (04/11/14); and Viral warts, unspecified (4/18/14). Mr. Bocanegra  has a past surgical history that includes hx appendectomy (1982); hx heent; hx knee arthroscopy (Bilateral); and hx orthopaedic. Social History/Living Environment:   Home Environment: Private residence  # Steps to Enter: 3  Rails to Enter: No  One/Two Story Residence: One story  Living Alone: Yes  Support Systems: Family member(s)  Patient Expects to be Discharged to[de-identified] Private residence  Current DME Used/Available at Home: [employer] [employer], [address] chair  Tub or Shower Type: Shower  Prior Level of Function/Work/Activity:  Pt was independent without an assistive device prior to this admission   Number of Personal Factors/Comorbidities that affect the Plan of Care: 3+: HIGH COMPLEXITY   EXAMINATION:   Most Recent Physical Functioning:               RLE AROM  R Knee Flexion: 99  R Knee Extension: 5                 Transfers  Sit to Stand: Modified independent  Stand to Sit: Modified independent                   Weight Bearing Status  Right Side Weight Bearing: As tolerated  Distance (ft): 104 Feet (ft) (x 2)  Ambulation - Level of Assistance: Modified independent  Assistive Device: Walker, rolling  Speed/Mckenzie: Shuffled; Slow  Step Length: Left shortened  Stance: Right decreased  Gait Abnormalities: Decreased step clearance  Number of Stairs Trained: 6 (x 3)  Stairs - Level of Assistance: Stand-by assistance  Rail Use: Both     Braces/Orthotics: none    Right Knee Cold  Type: Cryocuff      Body Structures Involved:  1. Joints  2.  Muscles Body Functions Affected:  1. Sensory/Pain  2. Movement Related Activities and Participation Affected:  1. General Tasks and Demands  2. Mobility   Number of elements that affect the Plan of Care: 4+: HIGH COMPLEXITY   CLINICAL PRESENTATION:   Presentation: Stable and uncomplicated: LOW COMPLEXITY   CLINICAL DECISION MAKIN \[facility]\"" [address] [id number] AM-PAC 6 Clicks   Basic Mobility Inpatient Short Form  How much difficulty does the patient currently have. .. Unable A Lot A Little None   1. Turning over in bed (including adjusting bedclothes, sheets and blankets)? [] 1   [] 2   [x] 3   [] 4   2. Sitting down on and standing up from a chair with arms ( e.g., wheelchair, bedside commode, etc.)   [] 1   [] 2   [x] 3   [] 4   3. Moving from lying on back to sitting on the side of the bed? [] 1   [] 2   [x] 3   [] 4   How much help from another person does the patient currently need. .. Total A Lot A Little None   4. Moving to and from a bed to a chair (including a wheelchair)? [] 1   [] 2   [x] 3   [] 4   5. Need to walk in hospital room? [] 1   [] 2   [x] 3   [] 4   6. Climbing 3-5 steps with a railing? [x] 1   [] 2   [] 3   [] 4   © , Trustees of [address] [place] [address] [address] [address], under license to [employer]. All rights reserved        Score:  Initial: 16 Most Recent: X (Date: -- )    Interpretation of Tool:  Represents activities that are increasingly more difficult (i.e. Bed mobility, Transfers, Gait). Score 24 23 22-20 19-15 14-10 9-7 6     Modifier CH CI CJ CK CL CM CN      ?  Mobility - Walking and Moving Around:     - CURRENT STATUS: CK - 40%-59% impaired, limited or restricted    - GOAL STATUS: CJ - 20%-39% impaired, limited or restricted    - D/C STATUS:  ---------------To be determined---------------  Payor: GENERIC COMMERCIAL / Plan: [facility] GENERIC COMMERCIAL / Product Type: Commerical /      Medical Necessity:     · Patient is expected to demonstrate progress in strength, range of motion, balance, coordination and functional technique to decrease assistance required with bed mobility, transfers & gait. Reason for Services/Other Comments:  · Patient continues to require skilled intervention due to pt not independent with functional mobility. Use of outcome tool(s) and clinical judgement create a POC that gives a: Clear prediction of patient's progress: LOW COMPLEXITY            TREATMENT:   (In addition to Assessment/Re-Assessment sessions the following treatments were rendered)     Pre-treatment Symptoms/Complaints:  none  Pain: Initial: visual scale  Pain Intensity 1: 0  Post Session:       Gait Training (15 Minutes):  Gait training to improve and/or restore physical functioning as related to mobility. Ambulated 104 Feet (ft) (x 2) with Modified independent using a Walker, rolling and minimal   related to their knee position and motion to promote proper body alignment. Therapeutic Exercise: (45 Minutes (group therapy)):  Exercises per grid below to improve strength.     Date:  4/19   Date:  4/20   Date:     ACTIVITY/EXERCISE AM PM AM PM AM PM   GROUP THERAPY  []  [x]  [x]  []  []  []   Ankle Pumps 15 15 20      Quad Sets 15 15 20      Gluteal Sets 15 15 20      Hip ABd/ADduction 15 15 20      Straight Leg Raises 15 15 20      Knee Slides 15 15 20      Short Arc Quads 15 15 20      Long Arc Quads         Chair Slides  15 20               B = bilateral; AA = active assistive; A = active; P = passive     Treatment/Session Assessment:     Response to Treatment:  Tolerated group therapy well    Education:  [] Home Exercises  [x] Fall Precautions  [] Hip Precautions [] D/C Instruction Review  [] Knee/Hip Prosthesis Review  [x] Walker Management/Safety [] Adaptive Equipment as Needed       Interdisciplinary Collaboration:   o Registered Nurse    After treatment position/precautions:   o Up in chair  o Bed/Chair-wheels locked  o Bed in low position  o Call light within reach  o RN notified    Compliance with Program/Exercises: Will assess as treatment progresses. Recommendations/Intent for next treatment session:  Treatment next visit will focus on increasing Mr. Koby Rangel independence with bed mobility, transfers, gait training, strength/ROM exercises, modalities for pain, and patient education.       Total Treatment Duration:  PT Patient Time In/Time Out  Time In: 1030  Time Out: [address], PTA

## 2018-04-20 NOTE — PROGRESS NOTES
2018         Post Op day: 2 Days Post-Op     Admit Date: 2018  Admit Diagnosis: Primary osteoarthritis of right knee [M17.11]        Subjective: Patient stable. No acute events. Objective:   Visit Vitals    /62 (BP 1 Location: Right arm, BP Patient Position: At rest)    Pulse 66    Temp 98.1 °F (36.7 °C)    Resp 17    Ht 6' 2\" (1.88 m)    Wt 112.4 kg (247 lb 14.4 oz)    SpO2 97%    BMI 31.83 kg/m2    Temp (24hrs), Av.8 °F (36.6 °C), Min:97.6 °F (36.4 °C), Max:98.1 °F (36.7 °C)    Lab Results   Component Value Date/Time    HGB 12.7 (L) 2018 05:25 AM     Extremity Exam  Dressing clean and dry   Tibialis Anterior and Gastroc-Soleus functioning normally right lower extremity  Sensation intact to light touch on operative limb  Extremity perfused  TEDS/SCDS in place  No sign of DVT     Assessment / Plan :  WBAT RLE  Continue PT/OT  Continue current DVT prophylaxis in house. Discharge on ASA BID  DIspo-HH  Patient Active Problem List   Diagnosis Code    Viral warts B07.9    Essential hypertension I10    Hyperlipidemia E78.5    Frontal sinusitis J32.1    Elevated coronary artery calcium score R93.1    Degenerative arthritis of knee M17.10    Knee effusion M25.469    Special screening for malignant neoplasm of prostate Z12.5    Family history of Alzheimer's disease Z82.0    Family history of coronary artery disease in mother Z80.55    Fasting hyperglycemia R73.01    Overweight E66.3    Chronic vesicular hand dermatitis L30.1    Statin myopathy G72.0, T46.6X5A    OA (osteoarthritis) of knee M17.10          Signed By: Armani Alanis MD     I have reviewed the patients controlled substance prescription history, as maintained in the [address] [place] [address] prescription monitoring program, so that the prescription(s) for a  controlled substance can be given.

## 2018-04-20 NOTE — DISCHARGE INSTRUCTIONS
[facility]   Patient Discharge Instructions    Brisa Hortonsavannah / [id number] : 1960    Admitted 2018 Discharged: 2018     IF YOU HAVE ANY PROBLEMS ONCE YOU ARE AT HOME CALL THE FOLLOWING NUMBERS:   Main office number: [phone]    Take Home Medications     · It is important that you take the medication exactly as they are prescribed. · Keep your medication in the bottles provided by the pharmacist and keep a list of the medication names, dosages, and times to be taken in your wallet. · Do not take other medications without consulting your doctor. What to do at [address] your prehospital diet. If you have excessive nausea or vomitting call your doctor's office     Home Physical Therapy is arranged. Use rolling walker when walking. Patients who have had a joint replacement should not drive until you are seen for your follow up appointment by Dr. Diana Barron. When to Call    - Call if you have a temperature greater then 101  - Unable to keep food down  - Loose control of your bladder or bowel function  - Are unable to bear any weight   - Need a pain medication refill       DISCHARGE SUMMARY from Nurse    The following personal items collected during your admission are returned to you:   Dental Appliance: Dental Appliances: None  Vision: Visual Aid: Glasses  Hearing Aid:   na  Jewelry: Jewelry: None  Clothing:   self  Other Valuables: Other Valuables: Cell Phone (pt states son has wallet ad he is ok to leave cell phone in [facility] in [facility])  Valuables sent to safe:   na    PATIENT INSTRUCTIONS:    After general anesthesia or intravenous sedation, for 24 hours or while taking prescription Narcotics:  · Limit your activities  · Do not drive and operate hazardous machinery  · Do not make important personal or business decisions  · Do  not drink alcoholic beverages  · If you have not urinated within 8 hours after discharge, please contact your surgeon on call.     Report the following to your surgeon:  · Excessive pain, swelling, redness or odor of or around the surgical area  · Temperature over 101  · Nausea and vomiting lasting longer than 4 hours or if unable to take medications  · Any signs of decreased circulation or nerve impairment to extremity: change in color, persistent  numbness, tingling, coldness or increase pain  · Any questions, call office @ [phone]      Keep scheduled follow up appointment. If need to change, call office @ [phone]. *  Please give a list of your current medications to your Primary Care Provider. *  Please update this list whenever your medications are discontinued, doses are      changed, or new medications (including over-the-counter products) are added. *  Please carry medication information at all times in case of emergency situations. Total Knee Replacement: What to Expect at [address] [place] [address]    When you leave the hospital, you should be able to move around with a walker or crutches. But you will need someone to help you at home for the next few weeks or until you have more energy and can move around better. If there is no one to help you at home, you may go to a rehabilitation center. You will go home with a bandage and stitches or staples. Change the bandage as your doctor tells you to. Your doctor will remove your stitches or staples 10 to 21 days after your surgery. You may still have some mild pain, and the area may be swollen for 3 to 6 months after surgery. Your knee will continue to improve for 6 to 12 months. You will probably use a walker for 1 to 3 weeks and then use crutches. When you are ready, you can use a cane. You will probably be able to walk on your own in 4 to 8 weeks. You will need to do months of physical rehabilitation (rehab) after a knee replacement. Rehab will help you strengthen the muscles of the knee and help you regain movement.  After you recover, your artificial knee will allow you to do normal daily activities with less pain or no pain at all. You may be able to hike, dance, ride a bike, and play golf. Talk to your doctor about whether you can do more strenuous activities. Always tell your caregivers that you have an artificial knee. How long it will take to walk on your own, return to normal activities, and go back to work depends on your health and how well your rehabilitation (rehab) program goes. The better you do with your rehab exercises, the quicker you will get your strength and movement back. This care sheet gives you a general idea about how long it will take for you to recover. But each person recovers at a different pace. Follow the steps below to get better as quickly as possible. How can you care for yourself at home? Activity  ? · Rest when you feel tired. You may take a nap, but do not stay in bed all day. When you sit, use a chair with arms. You can use the arms to help you stand up. ? · Work with your physical therapist to find the best way to exercise. You may be able to take frequent, short walks using crutches or a walker. What you can do as your knee heals will depend on whether your new knee is cemented or uncemented. You may not be able to do certain things for a while if your new knee is uncemented. ? · After your knee has healed enough, you can do more strenuous activities with caution. ¨ You can golf, but use a golf cart, and do not wear shoes with spikes. ¨ You can bike on a flat road or on a stationary bike. Avoid biking up hills. ¨ Your doctor may suggest that you stay away from activities that put stress on your knee. These include tennis or badminton, squash or racquetball, contact sports like football, jumping (such as in basketball), jogging, or running. ¨ Avoid activities where you might fall. These include horseback riding, skiing, and mountain biking. ? · Do not sit for more than 1 hour at a time.  Get up and walk around for a while before you sit again. If you must sit for a long time, prop up your leg with a chair or footstool. This will help you avoid swelling. ? · Ask your doctor when you can shower. You may need to take sponge baths until your stitches or staples have been removed. ? · Ask your doctor when you can drive again. It may take up to 8 weeks after knee replacement surgery before it is safe for you to drive. ? · When you get into a car, sit on the edge of the seat. Then pull in your legs, and turn to face the front. ? · You should be able to do many everyday activities 3 to 6 weeks after your surgery. You will probably need to take 4 to 16 weeks off from work. When you can go back to work depends on the type of work you do and how you feel. ? · Ask your doctor when it is okay for you to have sex. ? · Do not lift anything heavier than 10 pounds and do not lift weights for 12 weeks. Diet  ? · By the time you leave the hospital, you should be eating your normal diet. If your stomach is upset, try bland, low-fat foods like plain rice, broiled chicken, toast, and yogurt. Your doctor may suggest that you take iron and vitamin supplements. ? · Drink plenty of fluids (unless your doctor tells you not to). ? · Eat healthy foods, and watch your portion sizes. Try to stay at your ideal weight. Too much weight puts more stress on your new knee. ? · You may notice that your bowel movements are not regular right after your surgery. This is common. Try to avoid constipation and straining with bowel movements. You may want to take a fiber supplement every day. If you have not had a bowel movement after a couple of days, ask your doctor about taking a mild laxative. Medicines  ? · Your doctor will tell you if and when you can restart your medicines. He or she will also give you instructions about taking any new medicines.    ? · If you take blood thinners, such as warfarin (Coumadin), clopidogrel (Plavix), or aspirin, be sure to talk to your doctor. He or she will tell you if and when to start taking those medicines again. Make sure that you understand exactly what your doctor wants you to do.   ? · Your doctor may give you a blood-thinning medicine to prevent blood clots. If you take a blood thinner, be sure you get instructions about how to take your medicine safely. Blood thinners can cause serious bleeding problems. This medicine could be in pill form or as a shot (injection). If a shot is necessary, your doctor will tell you how to do this. ? · Be safe with medicines. Take pain medicines exactly as directed. ¨ If the doctor gave you a prescription medicine for pain, take it as prescribed. ¨ If you are not taking a prescription pain medicine, ask your doctor if you can take an over-the-counter medicine. ¨ Plan to take your pain medicine 30 minutes before exercises. It is easier to prevent pain before it starts than to stop it once it has started. ? · If you think your pain medicine is making you sick to your stomach:  ¨ Take your medicine after meals (unless your doctor has told you not to). ¨ Ask your doctor for a different pain medicine. ? · If your doctor prescribed antibiotics, take them as directed. Do not stop taking them just because you feel better. You need to take the full course of antibiotics. Incision care  ? · You will have a bandage over the cut (incision) and staples or stitches. Take the bandage off when your doctor says it is okay. ? · Your doctor will remove the staples or stitches 10 days to 3 weeks after the surgery and replace them with strips of tape. Leave the tape on for a week or until it falls off. Exercise  ? · Your rehab program will give you a number of exercises to do to help you get back your knee's range of motion and strength. Always do them as your therapist tells you. Ice and elevation  ? · For pain and swelling, put ice or a cold pack on the area for 10 to 20 minutes at a time.  Put a thin cloth between the ice and your skin. Other instructions  ? · Continue to wear your support stockings as your doctor says. These help to prevent blood clots. The length of time that you will have to wear them depends on your activity level and the amount of swelling. ? · You have metal pieces in your knee. These may set off some airport metal detectors. Carry a medical alert card that says you have an artificial joint, just in case. Follow-up care is a key part of your treatment and safety. Be sure to make and go to all appointments, and call your doctor if you are having problems. It's also a good idea to know your test results and keep a list of the medicines you take. When should you call for help? Call 911 anytime you think you may need emergency care. For example, call if:  ? · You passed out (lost consciousness). ? · You have severe trouble breathing. ? · You have sudden chest pain and shortness of breath, or you cough up blood. ?Call your doctor now or seek immediate medical care if:  ? · You have signs of infection, such as:  ¨ Increased pain, swelling, warmth, or redness. ¨ Red streaks leading from the incision. ¨ Pus draining from the incision. ¨ A fever. ? · You have signs of a blood clot, such as:  ¨ Pain in your calf, back of the knee, thigh, or groin. ¨ Redness and swelling in your leg or groin. ? · Your incision comes open and begins to bleed, or the bleeding increases. ? · You have pain that does not get better after you take pain medicine. ? Watch closely for changes in your health, and be sure to contact your doctor if:  ? · You do not have a bowel movement after taking a laxative. Where can you learn more? Go to http://juan-leti.info/. Enter T256 in the search box to learn more about \"Total Knee Replacement: What to Expect at Home. \"  Current as of: March 21, 2017  Content Version: 11.4  © 4380-6589 [employer].  Care instructions adapted under license by [employer] (which disclaims liability or warranty for this information). If you have questions about a medical condition or this instruction, always ask your healthcare professional. [address] any warranty or liability for your use of this information. These are general instructions for a healthy lifestyle:    No smoking/ No tobacco products/ Avoid exposure to second hand smoke    Surgeon General's Warning:  Quitting smoking now greatly reduces serious risk to your health. Obesity, smoking, and sedentary lifestyle greatly increases your risk for illness    A healthy diet, regular physical exercise & weight monitoring are important for maintaining a healthy lifestyle    You may be retaining fluid if you have a history of heart failure or if you experience any of the following symptoms:  Weight gain of 3 pounds or more overnight or 5 pounds in a week, increased swelling in our hands or feet or shortness of breath while lying flat in bed. Please call your doctor as soon as you notice any of these symptoms; do not wait until your next office visit. Recognize signs and symptoms of STROKE:    F-face looks uneven    A-arms unable to move or move even    S-speech slurred or non-existent    T-time-call 911 as soon as signs and symptoms begin-DO NOT go       Back to bed or wait to see if you get better-TIME IS BRAIN. The discharge information has been reviewed with the patient. The patient verbalized understanding. Information obtained by :  I understand that if any problems occur once I am at home I am to contact my physician. I understand and acknowledge receipt of the instructions indicated above.                                                                                                                                            Physician's or R.N.'s Signature Date/Time                                                                                                                                              Patient or Representative Signature                                                          Date/Time

## 2018-04-20 NOTE — PROGRESS NOTES
04/19/18 2144   Oxygen Therapy   O2 Sat (%) 96 %   Pulse via Oximetry 64 beats per minute   O2 Device Room air   c/s 7 connected to pt. Hx deleted, no complications.

## 2018-04-21 ENCOUNTER — HOME CARE VISIT (OUTPATIENT)
Dept: SCHEDULING | Facility: HOME HEALTH | Age: 58
End: 2018-04-21
Payer: COMMERCIAL

## 2018-04-21 VITALS
SYSTOLIC BLOOD PRESSURE: 134 MMHG | HEART RATE: 68 BPM | RESPIRATION RATE: 17 BRPM | TEMPERATURE: 96.1 F | DIASTOLIC BLOOD PRESSURE: 82 MMHG

## 2018-04-21 PROCEDURE — 400013 HH SOC

## 2018-04-21 PROCEDURE — G0151 HHCP-SERV OF PT,EA 15 MIN: HCPCS

## 2018-04-23 ENCOUNTER — HOME CARE VISIT (OUTPATIENT)
Dept: SCHEDULING | Facility: HOME HEALTH | Age: 58
End: 2018-04-23
Payer: COMMERCIAL

## 2018-04-23 VITALS
TEMPERATURE: 99.3 F | DIASTOLIC BLOOD PRESSURE: 90 MMHG | HEART RATE: 76 BPM | SYSTOLIC BLOOD PRESSURE: 140 MMHG | RESPIRATION RATE: 20 BRPM

## 2018-04-23 PROCEDURE — G0157 HHC PT ASSISTANT EA 15: HCPCS

## 2018-04-25 ENCOUNTER — HOME CARE VISIT (OUTPATIENT)
Dept: SCHEDULING | Facility: HOME HEALTH | Age: 58
End: 2018-04-25
Payer: COMMERCIAL

## 2018-04-25 VITALS
RESPIRATION RATE: 16 BRPM | HEART RATE: 76 BPM | DIASTOLIC BLOOD PRESSURE: 80 MMHG | SYSTOLIC BLOOD PRESSURE: 112 MMHG | TEMPERATURE: 98.1 F

## 2018-04-25 PROCEDURE — G0157 HHC PT ASSISTANT EA 15: HCPCS

## 2018-04-27 ENCOUNTER — HOME CARE VISIT (OUTPATIENT)
Dept: SCHEDULING | Facility: HOME HEALTH | Age: 58
End: 2018-04-27
Payer: COMMERCIAL

## 2018-04-27 VITALS
DIASTOLIC BLOOD PRESSURE: 76 MMHG | TEMPERATURE: 99.7 F | SYSTOLIC BLOOD PRESSURE: 116 MMHG | RESPIRATION RATE: 14 BRPM | HEART RATE: 72 BPM

## 2018-04-27 PROCEDURE — G0157 HHC PT ASSISTANT EA 15: HCPCS

## 2018-04-30 ENCOUNTER — HOME CARE VISIT (OUTPATIENT)
Dept: SCHEDULING | Facility: HOME HEALTH | Age: 58
End: 2018-04-30
Payer: COMMERCIAL

## 2018-04-30 VITALS
RESPIRATION RATE: 16 BRPM | HEART RATE: 64 BPM | DIASTOLIC BLOOD PRESSURE: 82 MMHG | SYSTOLIC BLOOD PRESSURE: 110 MMHG | TEMPERATURE: 97.5 F

## 2018-04-30 PROCEDURE — G0157 HHC PT ASSISTANT EA 15: HCPCS

## 2018-05-02 ENCOUNTER — HOME CARE VISIT (OUTPATIENT)
Dept: SCHEDULING | Facility: HOME HEALTH | Age: 58
End: 2018-05-02
Payer: COMMERCIAL

## 2018-05-02 VITALS
DIASTOLIC BLOOD PRESSURE: 86 MMHG | SYSTOLIC BLOOD PRESSURE: 122 MMHG | TEMPERATURE: 98.1 F | RESPIRATION RATE: 24 BRPM | HEART RATE: 84 BPM

## 2018-05-02 PROCEDURE — G0157 HHC PT ASSISTANT EA 15: HCPCS

## 2018-05-08 ENCOUNTER — HOME CARE VISIT (OUTPATIENT)
Dept: SCHEDULING | Facility: HOME HEALTH | Age: 58
End: 2018-05-08
Payer: COMMERCIAL

## 2018-05-08 VITALS
TEMPERATURE: 99.5 F | SYSTOLIC BLOOD PRESSURE: 130 MMHG | RESPIRATION RATE: 14 BRPM | HEART RATE: 96 BPM | DIASTOLIC BLOOD PRESSURE: 82 MMHG

## 2018-05-08 PROCEDURE — G0157 HHC PT ASSISTANT EA 15: HCPCS

## 2018-05-11 ENCOUNTER — HOME CARE VISIT (OUTPATIENT)
Dept: SCHEDULING | Facility: HOME HEALTH | Age: 58
End: 2018-05-11
Payer: COMMERCIAL

## 2018-05-11 VITALS
RESPIRATION RATE: 16 BRPM | HEART RATE: 76 BPM | TEMPERATURE: 98.3 F | SYSTOLIC BLOOD PRESSURE: 124 MMHG | DIASTOLIC BLOOD PRESSURE: 84 MMHG

## 2018-05-11 PROCEDURE — G0157 HHC PT ASSISTANT EA 15: HCPCS

## 2018-05-15 ENCOUNTER — HOME CARE VISIT (OUTPATIENT)
Dept: SCHEDULING | Facility: HOME HEALTH | Age: 58
End: 2018-05-15
Payer: COMMERCIAL

## 2018-05-15 VITALS
TEMPERATURE: 98 F | SYSTOLIC BLOOD PRESSURE: 132 MMHG | RESPIRATION RATE: 19 BRPM | HEART RATE: 76 BPM | DIASTOLIC BLOOD PRESSURE: 80 MMHG

## 2018-05-15 PROCEDURE — G0151 HHCP-SERV OF PT,EA 15 MIN: HCPCS

## 2021-08-12 ENCOUNTER — HOSPITAL ENCOUNTER (EMERGENCY)
Age: 61
Discharge: HOME | End: 2021-08-12
Attending: EMERGENCY MEDICINE
Payer: COMMERCIAL

## 2021-08-12 ENCOUNTER — APPOINTMENT (OUTPATIENT)
Dept: CT IMAGING | Age: 61
End: 2021-08-12
Attending: EMERGENCY MEDICINE
Payer: COMMERCIAL

## 2021-08-12 VITALS
OXYGEN SATURATION: 95 % | RESPIRATION RATE: 16 BRPM | HEART RATE: 52 BPM | DIASTOLIC BLOOD PRESSURE: 84 MMHG | HEIGHT: 74 IN | TEMPERATURE: 98.5 F | WEIGHT: 240 LBS | BODY MASS INDEX: 30.8 KG/M2 | SYSTOLIC BLOOD PRESSURE: 132 MMHG

## 2021-08-12 DIAGNOSIS — K42.9 UMBILICAL HERNIA WITHOUT OBSTRUCTION AND WITHOUT GANGRENE: Primary | ICD-10-CM

## 2021-08-12 LAB
ALBUMIN SERPL-MCNC: 4.3 G/DL (ref 3.2–4.6)
ALBUMIN/GLOB SERPL: 1.3 {RATIO} (ref 1.2–3.5)
ALP SERPL-CCNC: 61 U/L (ref 50–136)
ALT SERPL-CCNC: 26 U/L (ref 12–65)
ANION GAP SERPL CALC-SCNC: 5 MMOL/L (ref 7–16)
AST SERPL-CCNC: 15 U/L (ref 15–37)
BASOPHILS # BLD: 0.1 K/UL (ref 0–0.2)
BASOPHILS NFR BLD: 1 % (ref 0–2)
BILIRUB SERPL-MCNC: 0.5 MG/DL (ref 0.2–1.1)
BUN SERPL-MCNC: 17 MG/DL (ref 8–23)
CALCIUM SERPL-MCNC: 9.5 MG/DL (ref 8.3–10.4)
CHLORIDE SERPL-SCNC: 111 MMOL/L (ref 98–107)
CO2 SERPL-SCNC: 25 MMOL/L (ref 21–32)
CREAT SERPL-MCNC: 0.93 MG/DL (ref 0.8–1.5)
DIFFERENTIAL METHOD BLD: NORMAL
EOSINOPHIL # BLD: 0.5 K/UL (ref 0–0.8)
EOSINOPHIL NFR BLD: 6 % (ref 0.5–7.8)
ERYTHROCYTE [DISTWIDTH] IN BLOOD BY AUTOMATED COUNT: 13.4 % (ref 11.9–14.6)
GLOBULIN SER CALC-MCNC: 3.4 G/DL (ref 2.3–3.5)
GLUCOSE SERPL-MCNC: 110 MG/DL (ref 65–100)
HCT VFR BLD AUTO: 46.6 % (ref 41.1–50.3)
HGB BLD-MCNC: 16 G/DL (ref 13.6–17.2)
IMM GRANULOCYTES # BLD AUTO: 0 K/UL (ref 0–0.5)
IMM GRANULOCYTES NFR BLD AUTO: 0 % (ref 0–5)
LYMPHOCYTES # BLD: 1.9 K/UL (ref 0.5–4.6)
LYMPHOCYTES NFR BLD: 24 % (ref 13–44)
MCH RBC QN AUTO: 32.1 PG (ref 26.1–32.9)
MCHC RBC AUTO-ENTMCNC: 34.3 G/DL (ref 31.4–35)
MCV RBC AUTO: 93.4 FL (ref 79.6–97.8)
MONOCYTES # BLD: 0.8 K/UL (ref 0.1–1.3)
MONOCYTES NFR BLD: 10 % (ref 4–12)
NEUTS SEG # BLD: 4.7 K/UL (ref 1.7–8.2)
NEUTS SEG NFR BLD: 59 % (ref 43–78)
NRBC # BLD: 0 K/UL (ref 0–0.2)
PLATELET # BLD AUTO: 219 K/UL (ref 150–450)
PMV BLD AUTO: 10.1 FL (ref 9.4–12.3)
POTASSIUM SERPL-SCNC: 3.9 MMOL/L (ref 3.5–5.1)
PROT SERPL-MCNC: 7.7 G/DL (ref 6.3–8.2)
RBC # BLD AUTO: 4.99 M/UL (ref 4.23–5.6)
SODIUM SERPL-SCNC: 141 MMOL/L (ref 136–145)
WBC # BLD AUTO: 7.9 K/UL (ref 4.3–11.1)

## 2021-08-12 PROCEDURE — 85025 COMPLETE CBC W/AUTO DIFF WBC: CPT

## 2021-08-12 PROCEDURE — 74011000258 HC RX REV CODE- 258: Performed by: EMERGENCY MEDICINE

## 2021-08-12 PROCEDURE — 80053 COMPREHEN METABOLIC PANEL: CPT

## 2021-08-12 PROCEDURE — 74177 CT ABD & PELVIS W/CONTRAST: CPT

## 2021-08-12 PROCEDURE — 96374 THER/PROPH/DIAG INJ IV PUSH: CPT

## 2021-08-12 PROCEDURE — 81003 URINALYSIS AUTO W/O SCOPE: CPT

## 2021-08-12 PROCEDURE — 74011000636 HC RX REV CODE- 636: Performed by: EMERGENCY MEDICINE

## 2021-08-12 PROCEDURE — 99284 EMERGENCY DEPT VISIT MOD MDM: CPT

## 2021-08-12 PROCEDURE — 96375 TX/PRO/DX INJ NEW DRUG ADDON: CPT

## 2021-08-12 PROCEDURE — 74011250636 HC RX REV CODE- 250/636: Performed by: EMERGENCY MEDICINE

## 2021-08-12 RX ORDER — HYDROMORPHONE HYDROCHLORIDE 1 MG/ML
1 INJECTION, SOLUTION INTRAMUSCULAR; INTRAVENOUS; SUBCUTANEOUS
Status: COMPLETED | OUTPATIENT
Start: 2021-08-12 | End: 2021-08-12

## 2021-08-12 RX ORDER — ONDANSETRON 2 MG/ML
4 INJECTION INTRAMUSCULAR; INTRAVENOUS
Status: COMPLETED | OUTPATIENT
Start: 2021-08-12 | End: 2021-08-12

## 2021-08-12 RX ORDER — SODIUM CHLORIDE 0.9 % (FLUSH) 0.9 %
10 SYRINGE (ML) INJECTION
Status: COMPLETED | OUTPATIENT
Start: 2021-08-12 | End: 2021-08-12

## 2021-08-12 RX ADMIN — ONDANSETRON 4 MG: 2 INJECTION INTRAMUSCULAR; INTRAVENOUS at 14:55

## 2021-08-12 RX ADMIN — Medication 10 ML: at 15:58

## 2021-08-12 RX ADMIN — IOPAMIDOL 100 ML: 755 INJECTION, SOLUTION INTRAVENOUS at 15:58

## 2021-08-12 RX ADMIN — HYDROMORPHONE HYDROCHLORIDE 1 MG: 1 INJECTION, SOLUTION INTRAMUSCULAR; INTRAVENOUS; SUBCUTANEOUS at 14:56

## 2021-08-12 RX ADMIN — SODIUM CHLORIDE 100 ML: 900 INJECTION, SOLUTION INTRAVENOUS at 15:58

## 2021-08-12 NOTE — ED TRIAGE NOTES
Pt in states abdominal pain starting today. States known umbilical hernia and was at Dr. Dereck Winter office to schedule surgery. States she was palpating abdomen and began having increased pain. No nausea or fever.

## 2021-08-12 NOTE — ED NOTES
I have reviewed discharge instructions with the patient. The patient verbalized understanding. Patient left ED via Discharge Method: ambulatory to Home with self    Opportunity for questions and clarification provided. Patient given 0 scripts. To continue your aftercare when you leave the hospital, you may receive an automated call from our care team to check in on how you are doing. This is a free service and part of our promise to provide the best care and service to meet your aftercare needs.  If you have questions, or wish to unsubscribe from this service please call [phone]. Thank you for Choosing our [address] [place] [address] Emergency Department.
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
## 2021-08-12 NOTE — ED NOTES
56year old male who presents today for abdominal wall hernia pain. He states he went to his surgeon today for visit in preparation for repair. He states shortly after leaving the office, he began having increased pain and tenderness at site of hernia. Pain is worse with sitting and palpation. VSS in triage. Abdomen is tender on exam, though difficult to examine due to patient positioning and pain. Patient evaluated initially in triage. Rapid Medical Evaluation was conducted and necessary orders have been placed. I have performed a medical screening exam.  Care will now be transferred to the attending physician in the emergency department.   Hood Bauer, JOVITA 2:25 PM

## 2021-08-12 NOTE — DISCHARGE INSTRUCTIONS
Clear liquid diet for 12 to 24 hours. Tylenol and Motrin for pain. Return if worsening pain with nausea and vomiting or fever. Hold pressure to the affected area when straining to have a bowel movement and avoid heavy lifting. Call Dr. Ike Luo office tomorrow for follow-up and recheck early afternoon if you do not hear from them before then. Gaila Labs, nurse practitioner should call you to check on you tomorrow.

## 2021-09-08 ENCOUNTER — HOSPITAL ENCOUNTER (OUTPATIENT)
Dept: SURGERY | Age: 61
Discharge: HOME | End: 2021-09-08

## 2021-09-09 VITALS — WEIGHT: 245 LBS | BODY MASS INDEX: 31.44 KG/M2 | HEIGHT: 74 IN

## 2021-09-09 NOTE — PERIOP NOTES
Patient verified name and . Order for consent NOT found in EHR ; patient verifies procedure. Type 2 surgery, Phone assessment complete. Orders not received. Labs per surgeon: none  Labs per anesthesia protocol:  Cbc and bmp in emr dated 21 (within [facility] protocols). Patient COVID test date  21  0850. The testing center is located at the [facility] [address]. If appointment is needed-patient provided telephone number of [phone]. Patient answered medical/surgical history questions at their best of ability. All prior to admission medications documented in [facility]. Patient instructed to take the following medications the day of surgery according to anesthesia guidelines with a small sip of water: amlodipine, atenolol, crestor On the day before surgery please take Acetaminophen 1000mg in the morning and then again before bed. You may substitute for Tylenol 650 mg. Hold all vitamins 7 days prior to surgery and NSAIDS 5 days prior to surgery. Prescription meds to hold:mobic      Patient instructed on the following:    > Arrive at [address], time of arrival to be called the day before by 1700  > NPO after midnight including gum, mints, and ice chips  > Responsible adult must drive patient to the hospital, stay during surgery, and patient will need supervision 24 hours after anesthesia  > Use antibacterial soap/hibiclens in shower the night before surgery and on the morning of surgery  > All piercings must be removed prior to arrival.    > Leave all valuables (money and jewelry) at home but bring insurance card and ID on DOS.   > Do not wear make-up, nail polish, lotions, cologne, perfumes, powders, or oil on skin. Artificial nails are not permitted.

## 2021-09-13 RX ORDER — SODIUM CHLORIDE 0.9 % (FLUSH) 0.9 %
5-40 SYRINGE (ML) INJECTION AS NEEDED
Status: CANCELLED | OUTPATIENT
Start: 2021-09-13

## 2021-09-13 RX ORDER — SODIUM CHLORIDE 0.9 % (FLUSH) 0.9 %
5-40 SYRINGE (ML) INJECTION EVERY 8 HOURS
Status: CANCELLED | OUTPATIENT
Start: 2021-09-13

## 2021-09-15 ENCOUNTER — ANESTHESIA EVENT (OUTPATIENT)
Dept: SURGERY | Age: 61
End: 2021-09-15
Payer: COMMERCIAL

## 2021-09-15 ENCOUNTER — HOSPITAL ENCOUNTER (OUTPATIENT)
Age: 61
Setting detail: OUTPATIENT SURGERY
Discharge: HOME | End: 2021-09-15
Attending: SURGERY | Admitting: SURGERY
Payer: COMMERCIAL

## 2021-09-15 ENCOUNTER — ANESTHESIA (OUTPATIENT)
Dept: SURGERY | Age: 61
End: 2021-09-15
Payer: COMMERCIAL

## 2021-09-15 VITALS
OXYGEN SATURATION: 93 % | TEMPERATURE: 97.9 F | SYSTOLIC BLOOD PRESSURE: 140 MMHG | HEIGHT: 74 IN | DIASTOLIC BLOOD PRESSURE: 86 MMHG | HEART RATE: 56 BPM | WEIGHT: 237.4 LBS | BODY MASS INDEX: 30.47 KG/M2 | RESPIRATION RATE: 16 BRPM

## 2021-09-15 DIAGNOSIS — K43.6 INCARCERATED VENTRAL HERNIA: ICD-10-CM

## 2021-09-15 PROCEDURE — 77030035029 HC NDL INSUF VERES DISP COVD -B: Performed by: SURGERY

## 2021-09-15 PROCEDURE — 2709999900 HC NON-CHARGEABLE SUPPLY: Performed by: SURGERY

## 2021-09-15 PROCEDURE — 77030031139 HC SUT VCRL2 J&J -A: Performed by: SURGERY

## 2021-09-15 PROCEDURE — 77030037088 HC TUBE ENDOTRACH ORAL NSL COVD-A: Performed by: ANESTHESIOLOGY

## 2021-09-15 PROCEDURE — 77030002933 HC SUT MCRYL J&J -A: Performed by: SURGERY

## 2021-09-15 PROCEDURE — 76010000934 HC OR TIME 1 TO 1.5HR INTENSV - TIER 2: Performed by: SURGERY

## 2021-09-15 PROCEDURE — 76060000034 HC ANESTHESIA 1.5 TO 2 HR: Performed by: SURGERY

## 2021-09-15 PROCEDURE — 77030040361 HC SLV COMPR DVT MDII -B: Performed by: SURGERY

## 2021-09-15 PROCEDURE — 74011250636 HC RX REV CODE- 250/636: Performed by: ANESTHESIOLOGY

## 2021-09-15 PROCEDURE — 76210000006 HC OR PH I REC 0.5 TO 1 HR: Performed by: SURGERY

## 2021-09-15 PROCEDURE — 77030022704 HC SUT VLOC COVD -B: Performed by: SURGERY

## 2021-09-15 PROCEDURE — 76210000020 HC REC RM PH II FIRST 0.5 HR: Performed by: SURGERY

## 2021-09-15 PROCEDURE — 74011250636 HC RX REV CODE- 250/636: Performed by: NURSE ANESTHETIST, CERTIFIED REGISTERED

## 2021-09-15 PROCEDURE — 77030019908 HC STETH ESOPH SIMS -A: Performed by: ANESTHESIOLOGY

## 2021-09-15 PROCEDURE — 74011000250 HC RX REV CODE- 250: Performed by: STUDENT IN AN ORGANIZED HEALTH CARE EDUCATION/TRAINING PROGRAM

## 2021-09-15 PROCEDURE — 77030035277 HC OBTRTR BLDELSS DISP INTU -B: Performed by: SURGERY

## 2021-09-15 PROCEDURE — 49653 PR LAP, VENTRAL HERNIA REPAIR,INCARCERATED: CPT | Performed by: SURGERY

## 2021-09-15 PROCEDURE — 74011000250 HC RX REV CODE- 250: Performed by: SURGERY

## 2021-09-15 PROCEDURE — 77030039425 HC BLD LARYNG TRULITE DISP TELE -A: Performed by: ANESTHESIOLOGY

## 2021-09-15 PROCEDURE — 77030040922 HC BLNKT HYPOTHRM STRY -A: Performed by: ANESTHESIOLOGY

## 2021-09-15 PROCEDURE — 77030033188 HC TBNG FLTRD BIIFUR DISP CNMD -C: Performed by: SURGERY

## 2021-09-15 PROCEDURE — 77030016151 HC PROTCTR LNS DFOG COVD -B: Performed by: SURGERY

## 2021-09-15 PROCEDURE — 74011250636 HC RX REV CODE- 250/636: Performed by: SURGERY

## 2021-09-15 PROCEDURE — 77030008606 HC TRCR ENDOSC KII AMR -B: Performed by: SURGERY

## 2021-09-15 PROCEDURE — 74011250636 HC RX REV CODE- 250/636: Performed by: STUDENT IN AN ORGANIZED HEALTH CARE EDUCATION/TRAINING PROGRAM

## 2021-09-15 PROCEDURE — 74011250637 HC RX REV CODE- 250/637: Performed by: ANESTHESIOLOGY

## 2021-09-15 PROCEDURE — S2900 ROBOTIC SURGICAL SYSTEM: HCPCS | Performed by: SURGERY

## 2021-09-15 PROCEDURE — C1781 MESH (IMPLANTABLE): HCPCS | Performed by: SURGERY

## 2021-09-15 DEVICE — MESH HERN 4.5IN VENTRAL POLYPR EPTFE REP LTWT CIR LO PROF L: Type: IMPLANTABLE DEVICE | Site: ABDOMEN | Status: FUNCTIONAL

## 2021-09-15 RX ORDER — MIDAZOLAM HYDROCHLORIDE 1 MG/ML
2 INJECTION, SOLUTION INTRAMUSCULAR; INTRAVENOUS
Status: COMPLETED | OUTPATIENT
Start: 2021-09-15 | End: 2021-09-15

## 2021-09-15 RX ORDER — LIDOCAINE HYDROCHLORIDE 20 MG/ML
INJECTION, SOLUTION EPIDURAL; INFILTRATION; INTRACAUDAL; PERINEURAL AS NEEDED
Status: DISCONTINUED | OUTPATIENT
Start: 2021-09-15 | End: 2021-09-15 | Stop reason: HOSPADM

## 2021-09-15 RX ORDER — ONDANSETRON 2 MG/ML
INJECTION INTRAMUSCULAR; INTRAVENOUS AS NEEDED
Status: DISCONTINUED | OUTPATIENT
Start: 2021-09-15 | End: 2021-09-15 | Stop reason: HOSPADM

## 2021-09-15 RX ORDER — PROPOFOL 10 MG/ML
INJECTION, EMULSION INTRAVENOUS AS NEEDED
Status: DISCONTINUED | OUTPATIENT
Start: 2021-09-15 | End: 2021-09-15 | Stop reason: HOSPADM

## 2021-09-15 RX ORDER — OXYCODONE AND ACETAMINOPHEN 10; 325 MG/1; MG/1
1 TABLET ORAL AS NEEDED
Status: DISCONTINUED | OUTPATIENT
Start: 2021-09-15 | End: 2021-09-15 | Stop reason: HOSPADM

## 2021-09-15 RX ORDER — OXYCODONE HYDROCHLORIDE 5 MG/1
5 TABLET ORAL
Status: DISCONTINUED | OUTPATIENT
Start: 2021-09-15 | End: 2021-09-15 | Stop reason: HOSPADM

## 2021-09-15 RX ORDER — DEXAMETHASONE SODIUM PHOSPHATE 4 MG/ML
INJECTION, SOLUTION INTRA-ARTICULAR; INTRALESIONAL; INTRAMUSCULAR; INTRAVENOUS; SOFT TISSUE AS NEEDED
Status: DISCONTINUED | OUTPATIENT
Start: 2021-09-15 | End: 2021-09-15 | Stop reason: HOSPADM

## 2021-09-15 RX ORDER — EPHEDRINE SULFATE/0.9% NACL/PF 50 MG/5 ML
SYRINGE (ML) INTRAVENOUS AS NEEDED
Status: DISCONTINUED | OUTPATIENT
Start: 2021-09-15 | End: 2021-09-15 | Stop reason: HOSPADM

## 2021-09-15 RX ORDER — OXYCODONE AND ACETAMINOPHEN 5; 325 MG/1; MG/1
1 TABLET ORAL
Qty: 28 TABLET | Refills: 0 | Status: SHIPPED | OUTPATIENT
Start: 2021-09-15 | End: 2021-09-22

## 2021-09-15 RX ORDER — ACETAMINOPHEN 500 MG
1000 TABLET ORAL ONCE
Status: COMPLETED | OUTPATIENT
Start: 2021-09-15 | End: 2021-09-15

## 2021-09-15 RX ORDER — SODIUM CHLORIDE 0.9 % (FLUSH) 0.9 %
5-40 SYRINGE (ML) INJECTION AS NEEDED
Status: DISCONTINUED | OUTPATIENT
Start: 2021-09-15 | End: 2021-09-15 | Stop reason: HOSPADM

## 2021-09-15 RX ORDER — ROCURONIUM BROMIDE 10 MG/ML
INJECTION, SOLUTION INTRAVENOUS AS NEEDED
Status: DISCONTINUED | OUTPATIENT
Start: 2021-09-15 | End: 2021-09-15 | Stop reason: HOSPADM

## 2021-09-15 RX ORDER — CYCLOBENZAPRINE HCL 10 MG
10 TABLET ORAL
Qty: 45 TABLET | Refills: 0 | Status: SHIPPED | OUTPATIENT
Start: 2021-09-15

## 2021-09-15 RX ORDER — CEFAZOLIN SODIUM/WATER 2 G/20 ML
2 SYRINGE (ML) INTRAVENOUS ONCE
Status: COMPLETED | OUTPATIENT
Start: 2021-09-15 | End: 2021-09-15

## 2021-09-15 RX ORDER — FENTANYL CITRATE 50 UG/ML
INJECTION, SOLUTION INTRAMUSCULAR; INTRAVENOUS AS NEEDED
Status: DISCONTINUED | OUTPATIENT
Start: 2021-09-15 | End: 2021-09-15 | Stop reason: HOSPADM

## 2021-09-15 RX ORDER — HYDROMORPHONE HYDROCHLORIDE 2 MG/ML
0.5 INJECTION, SOLUTION INTRAMUSCULAR; INTRAVENOUS; SUBCUTANEOUS
Status: DISCONTINUED | OUTPATIENT
Start: 2021-09-15 | End: 2021-09-15 | Stop reason: HOSPADM

## 2021-09-15 RX ORDER — SODIUM CHLORIDE 0.9 % (FLUSH) 0.9 %
5-40 SYRINGE (ML) INJECTION EVERY 8 HOURS
Status: DISCONTINUED | OUTPATIENT
Start: 2021-09-15 | End: 2021-09-15 | Stop reason: HOSPADM

## 2021-09-15 RX ORDER — BUPIVACAINE HYDROCHLORIDE 2.5 MG/ML
INJECTION, SOLUTION EPIDURAL; INFILTRATION; INTRACAUDAL AS NEEDED
Status: DISCONTINUED | OUTPATIENT
Start: 2021-09-15 | End: 2021-09-15 | Stop reason: HOSPADM

## 2021-09-15 RX ORDER — SODIUM CHLORIDE, SODIUM LACTATE, POTASSIUM CHLORIDE, CALCIUM CHLORIDE 600; 310; 30; 20 MG/100ML; MG/100ML; MG/100ML; MG/100ML
75 INJECTION, SOLUTION INTRAVENOUS CONTINUOUS
Status: DISCONTINUED | OUTPATIENT
Start: 2021-09-15 | End: 2021-09-15 | Stop reason: HOSPADM

## 2021-09-15 RX ORDER — KETOROLAC TROMETHAMINE 30 MG/ML
INJECTION, SOLUTION INTRAMUSCULAR; INTRAVENOUS AS NEEDED
Status: DISCONTINUED | OUTPATIENT
Start: 2021-09-15 | End: 2021-09-15 | Stop reason: HOSPADM

## 2021-09-15 RX ADMIN — KETOROLAC TROMETHAMINE 30 MG: 30 INJECTION, SOLUTION INTRAMUSCULAR; INTRAVENOUS at 12:12

## 2021-09-15 RX ADMIN — PROPOFOL 200 MG: 10 INJECTION, EMULSION INTRAVENOUS at 10:58

## 2021-09-15 RX ADMIN — CEFAZOLIN 2 G: 1 INJECTION, POWDER, FOR SOLUTION INTRAVENOUS at 11:09

## 2021-09-15 RX ADMIN — SODIUM CHLORIDE, SODIUM LACTATE, POTASSIUM CHLORIDE, AND CALCIUM CHLORIDE: 600; 310; 30; 20 INJECTION, SOLUTION INTRAVENOUS at 11:25

## 2021-09-15 RX ADMIN — FENTANYL CITRATE 100 MCG: 50 INJECTION INTRAMUSCULAR; INTRAVENOUS at 10:58

## 2021-09-15 RX ADMIN — ROCURONIUM BROMIDE 50 MG: 10 INJECTION, SOLUTION INTRAVENOUS at 10:58

## 2021-09-15 RX ADMIN — ONDANSETRON 4 MG: 2 INJECTION INTRAMUSCULAR; INTRAVENOUS at 12:03

## 2021-09-15 RX ADMIN — ACETAMINOPHEN 1000 MG: 500 TABLET, FILM COATED ORAL at 09:23

## 2021-09-15 RX ADMIN — LIDOCAINE HYDROCHLORIDE 100 MG: 20 INJECTION, SOLUTION EPIDURAL; INFILTRATION; INTRACAUDAL; PERINEURAL at 10:58

## 2021-09-15 RX ADMIN — Medication 20 MG: at 11:18

## 2021-09-15 RX ADMIN — ROCURONIUM BROMIDE 10 MG: 10 INJECTION, SOLUTION INTRAVENOUS at 11:59

## 2021-09-15 RX ADMIN — Medication 20 MG: at 11:06

## 2021-09-15 RX ADMIN — DEXAMETHASONE SODIUM PHOSPHATE 10 MG: 4 INJECTION, SOLUTION INTRAMUSCULAR; INTRAVENOUS at 11:10

## 2021-09-15 RX ADMIN — SODIUM CHLORIDE, SODIUM LACTATE, POTASSIUM CHLORIDE, AND CALCIUM CHLORIDE 75 ML/HR: 600; 310; 30; 20 INJECTION, SOLUTION INTRAVENOUS at 09:30

## 2021-09-15 RX ADMIN — MIDAZOLAM 2 MG: 1 INJECTION INTRAMUSCULAR; INTRAVENOUS at 09:58

## 2021-09-15 RX ADMIN — OXYCODONE AND ACETAMINOPHEN 1 TABLET: 10; 325 TABLET ORAL at 12:59

## 2021-09-15 NOTE — PERIOP NOTES
Christy Lopez [phone], will return to  pt. Pls have surgeon call Jakub with update after surgery. Will take approximately 10 minutes to get to hospital for pickup. Pt belongings in Pre-Op; one blue bag.

## 2021-09-15 NOTE — DISCHARGE INSTRUCTIONS
No bathtub or pool for 2 weeks. Ok to shower. No weight lifting greater than 20 lbs for 4 weeks. Remove the clear dressing and gauze dressing from your umbilicus in 5 days. Leave the Steri strips in place. They will fall off on their own in 7-10 days. Take tylenol or ibuprofen for as needed for mild pain every 4-6 hours. Percocet prescribed for mod to severe pain. This is a narcotic and can cause drowsiness, nausea and vomiting and constipation. Take Colace 100mg BID (over the counter) if constipated.     Instructions Following Ambulatory Surgery    Activity  · As tolerated and directed by your doctor  · Bathe or shower as directed by your doctor    Diet  · Clear liquids until no nausea or vomiting; then light diet for the first day  · Advance to regular diet on second day, unless your doctor orders otherwise  · If nausea and vomiting continues, call your doctor    Pain  · Take pain medication as directed by your doctor  ·  Call your doctor if pain is NOT relieved by medication  · DO NOT take aspirin or blood thinners until directed by your doctor    Follow-Up Phone Calls  · Will be made nursing staff  · If you have any problems, call your doctor as needed    Call your doctor if  · Excessive bleeding that does not stop after holding mild pressure over the area  · Temperature of 101 degrees F or above  · Redness,excessive swelling or bruising, and/or green or yellow, smelly discharge from incision    After Anesthesia  · For the first 24 hours: DO NOT Drive, Drink alcoholic beverages, or Make important decisions  · Be aware of dizziness following anesthesia and while taking pain medication

## 2021-09-15 NOTE — PERIOP NOTES
Debrief completed Y    Correct procedure Y    Count completed and verified Y    Specimen collected and verified N/A    Wound classification Y    Other

## 2021-09-15 NOTE — OP NOTES
Operative Report    Name: Brando Byrd      MRN: [id number]       : 1960       Age: 56 y.o. Sex: male    Date of Surgery: 9/15/2021     Preoperative Diagnosis: Ventral hernia without obstruction or gangrene [K43.9]     Postoperative Diagnosis: Ventral hernia without obstruction or gangrene [K43.9]       Name of procedure: Procedure(s): HERNIA VENTRAL REPAIR ROBOTIC ASSISTED W/ [address] [facility],[address] - [place] [address]      Surgeon: Haroldo Prince MD     Anesthesia: General     Complications: None    Estimated Blood Loss: Minimal           Specimens: * No specimens in log *    Implants:  Implant Name Type Inv. Item Serial No.  Lot No. LRB No. Used Action   MESH CONSTANZA 4.5IN VENTRAL POLYPR EPTFE REP LTWT CIR LO PROF L - [id number]  MESH CONSTANZA 4.5IN VENTRAL POLYPR EPTFE REP LTWT CIR LO PROF L  BARD DAVOL_WD [id number] N/A 1 Implanted       Findings: 3 x 3 cm abdominal wall hernia defect found and closed with a running 0 PDS suture. A 11.6 x 11.6 cm BARD PTFE mesh was placed in the peritoneal space and fixated with running 2-0 V-loc suture. Statement of Medical Necessity: Brando Byrd is a 56 y.o. male who presented to the clinic complaining of periumbilical bulge and abdominal pain which has been worsening with time. On exam, he was found to have a incarcerated ventral hernia. We discussed risks, benefits of laparoscopic and open surgery as well as alternatives of surgery. Patient understood and agreed to proceed with a robotic-assisted laparoscopic ventral hernia repair with mesh. We discussed risks of hernia repair including but not limited to pain, infection, bleeding, scar, conversion from laparoscopic to open, injury to organs, enterotomy, cautery injury, hernia recurrence, need for additional procedures. Procedure Details   After informed consent was taken, patient was taken to the operating room and placed in supine position.  Anesthesia was induced and patient was intubated. Patient received preoperative antibiotics. The abdomen was prepped and draped in standard sterile fashion. A timeout was performed with all team members present. A 1 cm incision was made in the left upper quadrant. A Veress needle was inserted. Saline drop test was normal. The abdomen was insufflated with carbon dioxide to a pressure of 15 mmHg and the patient tolerated insufflation well. The abdomen was bluntly accessed with a blunt 8 mm robotic trocar. A laparoscope was inserted and a general survey was performed. There was no evidence of intraabdominal injury from trocar placement. Two additional 8 mm robotic cannulas were inserted in the left side of the abdomen. The patient was placed in a right tilted position. The robot was docked. The instruments were carefully inserted under direct visualization. A general survey was performed and an incarcerated ventral hernia was seen near the umbilicus. There was omentum in the hernia. This was carefully reduced and divided away from hernia sac. The peritoneum was too thin and we were unable to create a preperitoneal plane. Using a combination of blunt and sharp dissection, the hernia sac was dissected off the fascial defect and reduced intraabdominal. There was clearance of the fascia about 5-6 cm circumferentially. A 3 x 3 cm abdominal hernia defect was identified and measured. The hernia defect was closed with 0 PDS in a running fashion. The insufflation pressure was reduced to 8 mmHg. A 11.6 x 11.6 cm mesh was used and then implanted the mesh into the peritoneal space using a circumferential 2-0 V Loc suture. We had wide overlap of the hernia defect and a very good repair. No bleeding was observed. The preperitoneal plane was closed with 2-0 V-loc suture in a running fashion. The robot was undocked. The abdomen was desufflated. The ports were removed. The skin of all cannula insertion sites was closed with 4-0 Monocryl subcuticular sutures. Mastisol and Steri strips were applied to the skin incisions. A pressure dressing was placed at the umbilicus to avoid seroma from dead space. All counts were reported as correct. The patient was awakened from anesthesia, transferred to a stretcher, and transported to the PACU in good condition.         Signed by: Moni Anders MD  [address] [facility]  9/15/2021 12:14 PM

## 2021-09-15 NOTE — ANESTHESIA PREPROCEDURE EVALUATION
Relevant Problems   CARDIOVASCULAR   (+) Essential hypertension       Anesthetic History   No history of anesthetic complications            Review of Systems / Medical History  Patient summary reviewed and pertinent labs reviewed    Pulmonary          Smoker      Comments: D/Michael smoking X 1 month   Neuro/Psych   Within defined limits           Cardiovascular    Hypertension          CAD    Exercise tolerance: >4 METS  Comments: Elevated Ca++ score  2018 echo- preserved EF, nl valve fx   GI/Hepatic/Renal  Within defined limits              Endo/Other        Morbid obesity     Other Findings   Comments: Hx of multi-substance abuse D/Michael X yrs         Physical Exam    Airway  Mallampati: III  TM Distance: > 6 cm  Neck ROM: normal range of motion   Mouth opening: Normal     Cardiovascular    Rhythm: regular           Dental    Dentition: Full lower dentures and Full upper dentures     Pulmonary                 Abdominal  GI exam deferred       Other Findings            Anesthetic Plan    ASA: 2  Anesthesia type: general          Induction: Intravenous  Anesthetic plan and risks discussed with: Patient
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
## 2022-03-19 PROBLEM — T46.6X5A STATIN MYOPATHY: Status: ACTIVE | Noted: 2017-12-08

## 2022-03-19 PROBLEM — G72.0 STATIN MYOPATHY: Status: ACTIVE | Noted: 2017-12-08

## 2022-03-20 PROBLEM — M17.9 OA (OSTEOARTHRITIS) OF KNEE: Status: ACTIVE | Noted: 2018-04-18

## 2022-04-19 ENCOUNTER — APPOINTMENT (OUTPATIENT)
Dept: CT IMAGING | Age: 62
End: 2022-04-19
Attending: STUDENT IN AN ORGANIZED HEALTH CARE EDUCATION/TRAINING PROGRAM
Payer: COMMERCIAL

## 2022-04-19 ENCOUNTER — HOSPITAL ENCOUNTER (EMERGENCY)
Age: 62
Discharge: HOME | End: 2022-04-19
Attending: STUDENT IN AN ORGANIZED HEALTH CARE EDUCATION/TRAINING PROGRAM
Payer: COMMERCIAL

## 2022-04-19 ENCOUNTER — APPOINTMENT (OUTPATIENT)
Dept: GENERAL RADIOLOGY | Age: 62
End: 2022-04-19
Attending: EMERGENCY MEDICINE
Payer: COMMERCIAL

## 2022-04-19 VITALS
SYSTOLIC BLOOD PRESSURE: 153 MMHG | RESPIRATION RATE: 19 BRPM | DIASTOLIC BLOOD PRESSURE: 89 MMHG | TEMPERATURE: 97.3 F | HEART RATE: 80 BPM | HEIGHT: 74 IN | WEIGHT: 240 LBS | OXYGEN SATURATION: 95 % | BODY MASS INDEX: 30.8 KG/M2

## 2022-04-19 DIAGNOSIS — J20.9 ACUTE BRONCHITIS, UNSPECIFIED ORGANISM: Primary | ICD-10-CM

## 2022-04-19 DIAGNOSIS — I71.20 THORACIC AORTIC ANEURYSM WITHOUT RUPTURE: ICD-10-CM

## 2022-04-19 LAB
ALBUMIN SERPL-MCNC: 3.9 G/DL (ref 3.2–4.6)
ALBUMIN/GLOB SERPL: 1.1 {RATIO} (ref 1.2–3.5)
ALP SERPL-CCNC: 73 U/L (ref 50–136)
ALT SERPL-CCNC: 92 U/L (ref 12–65)
ANION GAP SERPL CALC-SCNC: 5 MMOL/L (ref 7–16)
AST SERPL-CCNC: 85 U/L (ref 15–37)
BASOPHILS # BLD: 0.1 K/UL (ref 0–0.2)
BASOPHILS NFR BLD: 1 % (ref 0–2)
BILIRUB SERPL-MCNC: 0.6 MG/DL (ref 0.2–1.1)
BNP SERPL-MCNC: 26 PG/ML (ref 5–125)
BUN SERPL-MCNC: 9 MG/DL (ref 8–23)
CALCIUM SERPL-MCNC: 8.4 MG/DL (ref 8.3–10.4)
CHLORIDE SERPL-SCNC: 110 MMOL/L (ref 98–107)
CO2 SERPL-SCNC: 28 MMOL/L (ref 21–32)
COVID-19 RAPID TEST, COVR: NOT DETECTED
CREAT SERPL-MCNC: 0.93 MG/DL (ref 0.8–1.5)
D DIMER PPP FEU-MCNC: 0.61 UG/ML(FEU)
DIFFERENTIAL METHOD BLD: ABNORMAL
EOSINOPHIL # BLD: 0.2 K/UL (ref 0–0.8)
EOSINOPHIL NFR BLD: 3 % (ref 0.5–7.8)
ERYTHROCYTE [DISTWIDTH] IN BLOOD BY AUTOMATED COUNT: 13.3 % (ref 11.9–14.6)
GLOBULIN SER CALC-MCNC: 3.5 G/DL (ref 2.3–3.5)
GLUCOSE SERPL-MCNC: 100 MG/DL (ref 65–100)
HCT VFR BLD AUTO: 49 % (ref 41.1–50.3)
HGB BLD-MCNC: 17.4 G/DL (ref 13.6–17.2)
IMM GRANULOCYTES # BLD AUTO: 0 K/UL (ref 0–0.5)
IMM GRANULOCYTES NFR BLD AUTO: 0 % (ref 0–5)
LIPASE SERPL-CCNC: 100 U/L (ref 73–393)
LYMPHOCYTES # BLD: 2 K/UL (ref 0.5–4.6)
LYMPHOCYTES NFR BLD: 32 % (ref 13–44)
MAGNESIUM SERPL-MCNC: 2.5 MG/DL (ref 1.8–2.4)
MCH RBC QN AUTO: 33.3 PG (ref 26.1–32.9)
MCHC RBC AUTO-ENTMCNC: 35.5 G/DL (ref 31.4–35)
MCV RBC AUTO: 93.7 FL (ref 79.6–97.8)
MONOCYTES # BLD: 0.7 K/UL (ref 0.1–1.3)
MONOCYTES NFR BLD: 12 % (ref 4–12)
NEUTS SEG # BLD: 3.2 K/UL (ref 1.7–8.2)
NEUTS SEG NFR BLD: 51 % (ref 43–78)
NRBC # BLD: 0 K/UL (ref 0–0.2)
PLATELET # BLD AUTO: 217 K/UL (ref 150–450)
PMV BLD AUTO: 9.5 FL (ref 9.4–12.3)
POTASSIUM SERPL-SCNC: 3.8 MMOL/L (ref 3.5–5.1)
PROT SERPL-MCNC: 7.4 G/DL (ref 6.3–8.2)
RBC # BLD AUTO: 5.23 M/UL (ref 4.23–5.6)
SODIUM SERPL-SCNC: 143 MMOL/L (ref 136–145)
SOURCE, COVRS: NORMAL
TROPONIN-HIGH SENSITIVITY: 9 PG/ML (ref 0–14)
TROPONIN-HIGH SENSITIVITY: 9.6 PG/ML (ref 0–14)
WBC # BLD AUTO: 6.3 K/UL (ref 4.3–11.1)

## 2022-04-19 PROCEDURE — 83735 ASSAY OF MAGNESIUM: CPT

## 2022-04-19 PROCEDURE — 71045 X-RAY EXAM CHEST 1 VIEW: CPT

## 2022-04-19 PROCEDURE — 80053 COMPREHEN METABOLIC PANEL: CPT

## 2022-04-19 PROCEDURE — 85025 COMPLETE CBC W/AUTO DIFF WBC: CPT

## 2022-04-19 PROCEDURE — 85379 FIBRIN DEGRADATION QUANT: CPT

## 2022-04-19 PROCEDURE — 94762 N-INVAS EAR/PLS OXIMTRY CONT: CPT

## 2022-04-19 PROCEDURE — 94640 AIRWAY INHALATION TREATMENT: CPT

## 2022-04-19 PROCEDURE — 83880 ASSAY OF NATRIURETIC PEPTIDE: CPT

## 2022-04-19 PROCEDURE — 74011000250 HC RX REV CODE- 250: Performed by: STUDENT IN AN ORGANIZED HEALTH CARE EDUCATION/TRAINING PROGRAM

## 2022-04-19 PROCEDURE — 99285 EMERGENCY DEPT VISIT HI MDM: CPT

## 2022-04-19 PROCEDURE — 87635 SARS-COV-2 COVID-19 AMP PRB: CPT

## 2022-04-19 PROCEDURE — 74011000636 HC RX REV CODE- 636: Performed by: STUDENT IN AN ORGANIZED HEALTH CARE EDUCATION/TRAINING PROGRAM

## 2022-04-19 PROCEDURE — 84484 ASSAY OF TROPONIN QUANT: CPT

## 2022-04-19 PROCEDURE — 71260 CT THORAX DX C+: CPT

## 2022-04-19 PROCEDURE — 93005 ELECTROCARDIOGRAM TRACING: CPT | Performed by: STUDENT IN AN ORGANIZED HEALTH CARE EDUCATION/TRAINING PROGRAM

## 2022-04-19 PROCEDURE — 83690 ASSAY OF LIPASE: CPT

## 2022-04-19 PROCEDURE — 74011000258 HC RX REV CODE- 258: Performed by: STUDENT IN AN ORGANIZED HEALTH CARE EDUCATION/TRAINING PROGRAM

## 2022-04-19 PROCEDURE — 93005 ELECTROCARDIOGRAM TRACING: CPT | Performed by: EMERGENCY MEDICINE

## 2022-04-19 RX ORDER — SODIUM CHLORIDE 0.9 % (FLUSH) 0.9 %
5-10 SYRINGE (ML) INJECTION AS NEEDED
Status: DISCONTINUED | OUTPATIENT
Start: 2022-04-19 | End: 2022-04-19 | Stop reason: HOSPADM

## 2022-04-19 RX ORDER — ALBUTEROL SULFATE 90 UG/1
2 AEROSOL, METERED RESPIRATORY (INHALATION)
Qty: 6.7 G | Refills: 0 | Status: SHIPPED | OUTPATIENT
Start: 2022-04-19

## 2022-04-19 RX ORDER — GUAIFENESIN AND DEXTROMETHORPHAN HYDROBROMIDE 1200; 60 MG/1; MG/1
1 TABLET, EXTENDED RELEASE ORAL EVERY 12 HOURS
Qty: 20 TABLET | Refills: 0 | Status: SHIPPED | OUTPATIENT
Start: 2022-04-19 | End: 2022-04-24

## 2022-04-19 RX ORDER — IPRATROPIUM BROMIDE AND ALBUTEROL SULFATE 2.5; .5 MG/3ML; MG/3ML
3 SOLUTION RESPIRATORY (INHALATION)
Status: COMPLETED | OUTPATIENT
Start: 2022-04-19 | End: 2022-04-19

## 2022-04-19 RX ORDER — SODIUM CHLORIDE 0.9 % (FLUSH) 0.9 %
10 SYRINGE (ML) INJECTION
Status: DISCONTINUED | OUTPATIENT
Start: 2022-04-19 | End: 2022-04-19 | Stop reason: HOSPADM

## 2022-04-19 RX ORDER — PREDNISONE 20 MG/1
40 TABLET ORAL DAILY
Qty: 8 TABLET | Refills: 0 | Status: SHIPPED | OUTPATIENT
Start: 2022-04-19 | End: 2022-04-23

## 2022-04-19 RX ORDER — SODIUM CHLORIDE 0.9 % (FLUSH) 0.9 %
5-10 SYRINGE (ML) INJECTION EVERY 8 HOURS
Status: DISCONTINUED | OUTPATIENT
Start: 2022-04-19 | End: 2022-04-19 | Stop reason: HOSPADM

## 2022-04-19 RX ORDER — DOXYCYCLINE HYCLATE 100 MG
100 TABLET ORAL 2 TIMES DAILY
Qty: 20 TABLET | Refills: 0 | Status: SHIPPED | OUTPATIENT
Start: 2022-04-19 | End: 2022-04-29

## 2022-04-19 RX ADMIN — IOPAMIDOL 100 ML: 755 INJECTION, SOLUTION INTRAVENOUS at 16:07

## 2022-04-19 RX ADMIN — SODIUM CHLORIDE 100 ML: 9 INJECTION, SOLUTION INTRAVENOUS at 16:07

## 2022-04-19 RX ADMIN — IPRATROPIUM BROMIDE AND ALBUTEROL SULFATE 3 ML: .5; 3 SOLUTION RESPIRATORY (INHALATION) at 15:14

## 2022-04-19 NOTE — ED NOTES
I have reviewed discharge instructions with the patient. The patient verbalized understanding. Patient left ED via Discharge Method: ambulatory to Home with self. Opportunity for questions and clarification provided. Patient given 4 scripts. Follow up with pulmonary/cardiology discussed.

## 2022-04-19 NOTE — Clinical Note
[id number] [address] EMERGENCY DEPT  [address] [id number] [phone]    Work/School Note    Date: 4/19/2022    To Whom It May concern:    Chuy Bocanegra was seen and treated today in the emergency room by the following provider(s):  Attending Provider: Gracie Mukherjee DO. Chuy Bocanegra is excused from work/school on 04/19/22 and 04/20/22. He is medically clear to return to work/school on 4/21/2022.        Sincerely,          Jeffery Osborne DO

## 2022-04-19 NOTE — Clinical Note
[facility] EMERGENCY DEPT  [facility] [address] [phone]    Work/School Note    Date: 4/19/2022    To Whom It May concern:    Jared Bocanegra was seen and treated today in the emergency room by the following provider(s):  Attending Provider: Candelario Boyd DO. Jared Bocanegra is excused from work/school on 04/19/22 and 04/20/22. He is medically clear to return to work/school on 4/21/2022.        Sincerely,          Dorothy Garcia DO

## 2022-04-19 NOTE — Clinical Note
[id number] [address] EMERGENCY DEPT  [address] [address] [phone]    Work/School Note    Date: 4/19/2022    To Whom It May concern:    Wendi Lanes [place] was seen and treated today in the emergency room by the following provider(s):  Attending Provider: Edmund Hensley DO. Wendi Lanes [place] is excused from work/school on 04/19/22 and 04/20/22. He is medically clear to return to work/school on 4/21/2022.        Sincerely,          Jayson Briggs DO
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
## 2022-04-19 NOTE — DISCHARGE INSTRUCTIONS
Your x-ray, CT, blood work and EKG all look stable today. CT imaging showed evidence of a dilation of the ascending aorta. This should be monitored as discussed. Please call the vascular specialist listed to arrange this follow-up. Take the medication prescribed as directed. Be sure to finish the entire course of antibiotics and prednisone as discussed. Albuterol should be used as needed for coughing, shortness of breath and wheezing. Use the Mucinex D for coughing as well. Please arrange follow-up with both your primary care provider and the pulmonology specialist listed. Return for worsening symptoms, concerns or questions.

## 2022-04-19 NOTE — ED TRIAGE NOTES
Pt here with c/o CP and SOB for about 4 days now. States the CP is intermittent. No cardiac hx for MI. Masked.

## 2022-04-20 LAB
ATRIAL RATE: 82 BPM
CALCULATED P AXIS, ECG09: 23 DEGREES
CALCULATED R AXIS, ECG10: 39 DEGREES
CALCULATED T AXIS, ECG11: 64 DEGREES
DIAGNOSIS, 93000: NORMAL
P-R INTERVAL, ECG05: 164 MS
Q-T INTERVAL, ECG07: 398 MS
QRS DURATION, ECG06: 94 MS
QTC CALCULATION (BEZET), ECG08: 464 MS
VENTRICULAR RATE, ECG03: 82 BPM

## 2022-05-04 DIAGNOSIS — E78.2 MIXED HYPERLIPIDEMIA: ICD-10-CM

## 2022-05-04 DIAGNOSIS — Z82.49 FAMILY HISTORY OF CORONARY ARTERY DISEASE IN MOTHER: ICD-10-CM

## 2022-05-04 DIAGNOSIS — R73.01 FASTING HYPERGLYCEMIA: ICD-10-CM

## 2022-05-04 DIAGNOSIS — R93.1 ELEVATED CORONARY ARTERY CALCIUM SCORE: ICD-10-CM

## 2022-05-04 DIAGNOSIS — I10 ESSENTIAL HYPERTENSION: Primary | ICD-10-CM

## 2022-05-04 DIAGNOSIS — I65.23 BILATERAL CAROTID ARTERY STENOSIS: ICD-10-CM

## 2022-08-05 DIAGNOSIS — I65.23 BILATERAL CAROTID ARTERY STENOSIS: ICD-10-CM

## 2022-08-05 DIAGNOSIS — R93.1 ELEVATED CORONARY ARTERY CALCIUM SCORE: ICD-10-CM

## 2022-08-05 DIAGNOSIS — I10 ESSENTIAL HYPERTENSION: ICD-10-CM

## 2022-08-05 DIAGNOSIS — Z82.49 FAMILY HISTORY OF CORONARY ARTERY DISEASE IN MOTHER: ICD-10-CM

## 2022-08-05 DIAGNOSIS — R73.01 FASTING HYPERGLYCEMIA: ICD-10-CM

## 2022-08-05 DIAGNOSIS — E78.2 MIXED HYPERLIPIDEMIA: ICD-10-CM

## 2022-08-05 LAB
ALBUMIN SERPL-MCNC: 4.2 G/DL (ref 3.2–4.6)
ALBUMIN/GLOB SERPL: 1.4 {RATIO} (ref 1.2–3.5)
ALP SERPL-CCNC: 64 U/L (ref 50–136)
ALT SERPL-CCNC: 83 U/L (ref 12–65)
ANION GAP SERPL CALC-SCNC: 3 MMOL/L (ref 7–16)
AST SERPL-CCNC: 79 U/L (ref 15–37)
BILIRUB SERPL-MCNC: 1.2 MG/DL (ref 0.2–1.1)
BUN SERPL-MCNC: 9 MG/DL (ref 8–23)
CALCIUM SERPL-MCNC: 8.9 MG/DL (ref 8.3–10.4)
CHLORIDE SERPL-SCNC: 110 MMOL/L (ref 98–107)
CHOLEST SERPL-MCNC: 127 MG/DL
CO2 SERPL-SCNC: 28 MMOL/L (ref 21–32)
CREAT SERPL-MCNC: 0.9 MG/DL (ref 0.8–1.5)
GLOBULIN SER CALC-MCNC: 2.9 G/DL (ref 2.3–3.5)
GLUCOSE SERPL-MCNC: 88 MG/DL (ref 65–100)
HDLC SERPL-MCNC: 42 MG/DL (ref 40–60)
HDLC SERPL: 3 {RATIO}
LDLC SERPL CALC-MCNC: 59.6 MG/DL
POTASSIUM SERPL-SCNC: 3.7 MMOL/L (ref 3.5–5.1)
PROT SERPL-MCNC: 7.1 G/DL (ref 6.3–8.2)
SODIUM SERPL-SCNC: 141 MMOL/L (ref 136–145)
TRIGL SERPL-MCNC: 127 MG/DL (ref 35–150)
VLDLC SERPL CALC-MCNC: 25.4 MG/DL (ref 6–23)

## 2022-08-12 ENCOUNTER — OFFICE VISIT (OUTPATIENT)
Dept: INTERNAL MEDICINE CLINIC | Facility: CLINIC | Age: 62
End: 2022-08-12
Payer: COMMERCIAL

## 2022-08-12 VITALS
TEMPERATURE: 98.1 F | OXYGEN SATURATION: 98 % | HEIGHT: 74 IN | WEIGHT: 223.8 LBS | SYSTOLIC BLOOD PRESSURE: 136 MMHG | HEART RATE: 70 BPM | BODY MASS INDEX: 28.72 KG/M2 | DIASTOLIC BLOOD PRESSURE: 88 MMHG

## 2022-08-12 DIAGNOSIS — E78.2 MIXED HYPERLIPIDEMIA: ICD-10-CM

## 2022-08-12 DIAGNOSIS — L30.1 CHRONIC VESICULAR HAND DERMATITIS: Chronic | ICD-10-CM

## 2022-08-12 DIAGNOSIS — R74.8 ELEVATED LIVER ENZYMES: Primary | ICD-10-CM

## 2022-08-12 DIAGNOSIS — I10 ESSENTIAL HYPERTENSION: Chronic | ICD-10-CM

## 2022-08-12 DIAGNOSIS — R74.8 ELEVATED LIVER ENZYMES: ICD-10-CM

## 2022-08-12 LAB
FERRITIN SERPL-MCNC: 884 NG/ML (ref 8–388)
GGT SERPL-CCNC: 150 U/L (ref 15–85)
HAV IGM SER QL: NONREACTIVE
HBV CORE IGM SER QL: NONREACTIVE
HBV SURFACE AG SER QL: NONREACTIVE
HCV AB SER QL: NONREACTIVE
IRON SATN MFR SERPL: 45 %
IRON SERPL-MCNC: 124 UG/DL (ref 35–150)
TIBC SERPL-MCNC: 275 UG/DL (ref 250–450)

## 2022-08-12 PROCEDURE — 99214 OFFICE O/P EST MOD 30 MIN: CPT | Performed by: NURSE PRACTITIONER

## 2022-08-12 RX ORDER — CLOBETASOL PROPIONATE 0.5 MG/G
OINTMENT TOPICAL 2 TIMES DAILY
Qty: 60 G | Refills: 1 | Status: SHIPPED | OUTPATIENT
Start: 2022-08-12

## 2022-08-12 ASSESSMENT — PATIENT HEALTH QUESTIONNAIRE - PHQ9
1. LITTLE INTEREST OR PLEASURE IN DOING THINGS: 0
SUM OF ALL RESPONSES TO PHQ QUESTIONS 1-9: 0
2. FEELING DOWN, DEPRESSED OR HOPELESS: 0
SUM OF ALL RESPONSES TO PHQ9 QUESTIONS 1 & 2: 0

## 2022-08-12 NOTE — PROGRESS NOTES
PROGRESS NOTE    SUBJECTIVE:   Mirna Savage is a 59 y.o. male seen for a follow up visit for   Chief Complaint   Patient presents with    Follow-up Chronic Condition     Hypertension    The history is provided by the patient and medical records. This is a chronic problem. Episode onset: 2000. The  problem has been gradually improving. Pertinent  negatives include no chest pain, no orthopnea, no palpitations, no PND, no malaise/fatigue, no blurred vision, no headaches, no neck pain, no dizziness, no shortness of breath and no nausea. Risk  factors include male gender, smoking/tobacco exposure and dyslipidemia. Cholesterol Problem   The history is provided by the patient and medical records. This is a chronic problem. Episode onset: 2000. The  problem occurs constantly. The problem has been gradually  improving. Pertinent negatives include no chest pain,  no abdominal pain, no headaches and no shortness of breath. The symptoms are aggravated by eating. The symptoms are relieved by medications (diet, exercise (golf)). Skin Problem   History provided by: scaliness on palm of hands. This is a recurrent problem. Episode onset: for 2 weeks. The  problem occurs constantly. The problem has been gradually  worsening. Pertinent negatives include no chest pain,  no abdominal pain, no headaches and no shortness of breath. Nothing aggravates the symptoms. Nothing relieves the symptoms. Treatments tried: oitment. He states since his last visit he has had an ER visit regarding chest pain. He reports work-up was negative     Reviewed and updated this visit by provider:  Tobacco  Allergies  Meds  Problems  Med Hx  Surg Hx  Fam Hx         Review of Systems   Constitutional:  Negative for chills, fatigue and fever. Respiratory:  Negative for chest tightness, shortness of breath and wheezing. Cardiovascular:  Negative for chest pain, palpitations and leg swelling.    Gastrointestinal:  Negative for abdominal pain. Endocrine: Negative for polydipsia, polyphagia and polyuria. Genitourinary:  Negative for frequency. Musculoskeletal:  Negative for gait problem. Skin:  Negative for rash. Neurological:  Negative for weakness, numbness and headaches. Psychiatric/Behavioral:  Negative for dysphoric mood. The patient is not nervous/anxious. OBJECTIVE:    BP (!) 144/80 (Site: Left Upper Arm, Position: Sitting, Cuff Size: Large Adult)   Pulse 70   Temp 98.1 °F (36.7 °C) (Temporal)   Ht 6' 2\" (1.88 m)   Wt 223 lb 12.8 oz (101.5 kg)   SpO2 98%   BMI 28.73 kg/m²      Physical Exam  Vitals and nursing note reviewed. Constitutional:       Appearance: Normal appearance. HENT:      Head: Normocephalic. Mouth/Throat:      Lips: Pink. Mouth: Mucous membranes are moist.   Eyes:      General: Lids are normal.   Neck:      Vascular: No carotid bruit. Cardiovascular:      Rate and Rhythm: Normal rate and regular rhythm. Pulmonary:      Effort: Pulmonary effort is normal.      Breath sounds: Normal breath sounds. Musculoskeletal:      Cervical back: Neck supple. Right lower leg: No edema. Left lower leg: No edema. Skin:     General: Skin is warm and dry. Neurological:      Mental Status: He is alert and oriented to person, place, and time. Mental status is at baseline. Gait: Gait normal.   Psychiatric:         Mood and Affect: Mood normal.         Behavior: Behavior normal.        ASSESSMENT and PLAN    1. Elevated liver enzymes  -     Hepatitis Panel, Acute; Future  -     Gamma GT; Future  -     Transferrin Saturation; Future  -     Ferritin; Future  -     DENI, Direct, w/Reflex; Future  -     Alpha-1-Antitrypsin; Future  -     Anti-smooth muscle antibody; Future  -     Ceruloplasmin; Future  -     Mitochondrial antibodies, M2; Future  2. Mixed hyperlipidemia  3. Essential hypertension  4.  Chronic vesicular hand dermatitis  -     clobetasol (TEMOVATE) 0.05 % ointment;

## 2022-08-13 LAB
A1AT SERPL-MCNC: 153 MG/DL (ref 101–187)
ANA SER QL: NEGATIVE

## 2022-08-14 LAB
CERULOPLASMIN SERPL-MCNC: 15.5 MG/DL (ref 16–31)
MITOCHONDRIA M2 IGG SER-ACNC: <20 UNITS (ref 0–20)

## 2022-08-15 LAB — SMA IGG SER-ACNC: 4 UNITS (ref 0–19)

## 2022-08-26 RX ORDER — MELOXICAM 7.5 MG/1
TABLET ORAL
Qty: 90 TABLET | Refills: 0 | OUTPATIENT
Start: 2022-08-26

## 2022-09-27 ASSESSMENT — ENCOUNTER SYMPTOMS
SHORTNESS OF BREATH: 0
ABDOMINAL PAIN: 0
WHEEZING: 0
CHEST TIGHTNESS: 0

## 2022-10-13 ENCOUNTER — OFFICE VISIT (OUTPATIENT)
Dept: INTERNAL MEDICINE CLINIC | Facility: CLINIC | Age: 62
End: 2022-10-13
Payer: COMMERCIAL

## 2022-10-13 VITALS
SYSTOLIC BLOOD PRESSURE: 124 MMHG | DIASTOLIC BLOOD PRESSURE: 76 MMHG | TEMPERATURE: 97.6 F | WEIGHT: 224 LBS | OXYGEN SATURATION: 96 % | BODY MASS INDEX: 28.76 KG/M2 | HEART RATE: 57 BPM

## 2022-10-13 DIAGNOSIS — Z09 HOSPITAL DISCHARGE FOLLOW-UP: Primary | ICD-10-CM

## 2022-10-13 DIAGNOSIS — I10 ESSENTIAL HYPERTENSION: ICD-10-CM

## 2022-10-13 DIAGNOSIS — E78.2 MIXED HYPERLIPIDEMIA: ICD-10-CM

## 2022-10-13 DIAGNOSIS — R93.1 ELEVATED CORONARY ARTERY CALCIUM SCORE: ICD-10-CM

## 2022-10-13 DIAGNOSIS — R74.8 ELEVATED LIVER ENZYMES: ICD-10-CM

## 2022-10-13 LAB
ALBUMIN SERPL-MCNC: 4.3 G/DL (ref 3.2–4.6)
ALBUMIN/GLOB SERPL: 1.5 {RATIO} (ref 0.4–1.6)
ALP SERPL-CCNC: 54 U/L (ref 50–136)
ALT SERPL-CCNC: 53 U/L (ref 12–65)
ANION GAP SERPL CALC-SCNC: 1 MMOL/L (ref 2–11)
AST SERPL-CCNC: 34 U/L (ref 15–37)
BILIRUB SERPL-MCNC: 1.1 MG/DL (ref 0.2–1.1)
BUN SERPL-MCNC: 13 MG/DL (ref 8–23)
CALCIUM SERPL-MCNC: 9.2 MG/DL (ref 8.3–10.4)
CHLORIDE SERPL-SCNC: 113 MMOL/L (ref 101–110)
CO2 SERPL-SCNC: 27 MMOL/L (ref 21–32)
CREAT SERPL-MCNC: 0.9 MG/DL (ref 0.8–1.5)
GLOBULIN SER CALC-MCNC: 2.8 G/DL (ref 2.8–4.5)
GLUCOSE SERPL-MCNC: 94 MG/DL (ref 65–100)
POTASSIUM SERPL-SCNC: 4.4 MMOL/L (ref 3.5–5.1)
PROT SERPL-MCNC: 7.1 G/DL (ref 6.3–8.2)
SODIUM SERPL-SCNC: 141 MMOL/L (ref 133–143)

## 2022-10-13 PROCEDURE — 99214 OFFICE O/P EST MOD 30 MIN: CPT | Performed by: NURSE PRACTITIONER

## 2022-10-13 PROCEDURE — 3078F DIAST BP <80 MM HG: CPT | Performed by: NURSE PRACTITIONER

## 2022-10-13 PROCEDURE — 3074F SYST BP LT 130 MM HG: CPT | Performed by: NURSE PRACTITIONER

## 2022-10-13 PROCEDURE — 1111F DSCHRG MED/CURRENT MED MERGE: CPT | Performed by: NURSE PRACTITIONER

## 2022-10-13 RX ORDER — TRAZODONE HYDROCHLORIDE 100 MG/1
TABLET ORAL
COMMUNITY
Start: 2022-10-09

## 2022-10-13 RX ORDER — AMLODIPINE BESYLATE 5 MG/1
5 TABLET ORAL DAILY
Qty: 90 TABLET | Refills: 3 | Status: SHIPPED | OUTPATIENT
Start: 2022-10-13

## 2022-10-13 RX ORDER — ROSUVASTATIN CALCIUM 20 MG/1
20 TABLET, COATED ORAL DAILY
Qty: 90 TABLET | Refills: 3 | Status: SHIPPED | OUTPATIENT
Start: 2022-10-13

## 2022-10-13 RX ORDER — NALTREXONE HYDROCHLORIDE 50 MG/1
TABLET, FILM COATED ORAL
COMMUNITY
Start: 2022-10-09

## 2022-10-13 RX ORDER — ATENOLOL 100 MG/1
100 TABLET ORAL DAILY
Qty: 90 TABLET | Refills: 3 | Status: SHIPPED | OUTPATIENT
Start: 2022-10-13

## 2022-10-13 ASSESSMENT — PATIENT HEALTH QUESTIONNAIRE - PHQ9
SUM OF ALL RESPONSES TO PHQ QUESTIONS 1-9: 0
1. LITTLE INTEREST OR PLEASURE IN DOING THINGS: 0
SUM OF ALL RESPONSES TO PHQ9 QUESTIONS 1 & 2: 0
SUM OF ALL RESPONSES TO PHQ QUESTIONS 1-9: 0
SUM OF ALL RESPONSES TO PHQ QUESTIONS 1-9: 0
2. FEELING DOWN, DEPRESSED OR HOPELESS: 0
SUM OF ALL RESPONSES TO PHQ QUESTIONS 1-9: 0

## 2022-10-13 NOTE — PROGRESS NOTES
Post-Discharge Transitional Care Management Progress Note      Clau Daily   YOB: 1960    Date of Office Visit:  10/13/2022  Date of Hospital Admission: 10/4/2022  Date of Hospital Discharge: 10/9/2022  [facility] management risk score Rising risk (score 2-5) and Complex Care (Scores >=6): No Risk Score On File     Non face to face  following discharge, date last encounter closed (first attempt may have been earlier): *No documented post hospital discharge outreach found in the last 14 days *No documented post hospital discharge outreach found in the last 14 days    Call initiated 2 business days of discharge: *No response recorded in the last 14 days    ASSESSMENT/PLAN:   Hospital discharge follow-up  -     MT DISCHARGE MEDS RECONCILED W/ CURRENT OUTPATIENT MED LIST    Medical Decision Making: moderate complexity  No follow-ups on file. On this date 10/13/2022 I have spent 35 minutes reviewing previous notes, test results and face to face with the patient discussing the diagnosis and importance of compliance with the treatment plan as well as documenting on the day of the visit. Discussed medications prescribed through [facility] for Tomer Alcocer. He is now on Naltrexone and trazodone. Discussed plan if having increasing depression he states he will call a friend from [place] [id number]. He does not currently have a sponsor but is working on establishing a sponsor. Discussed his dog and dog's health. Apparently the dog now requires medical treatment for illness which can be life threatening. Discussed concern if dog passes he may relapse with depression. He has a close friend to discuss this with also has psych follow-up. He verbally contracts for self-safety and agrees to seek treatment immediately if depression relapses.      Subjective:   HPI:  Follow up of Hospital problems/diagnosis(es): alcoholism, depression, suicidal ideations     Inpatient course: Discharge summary reviewed- see chart. Interval history/Current status: Mr. Elton Garcia voluntarily admitted self to [facility]. He states he was at point of depression that he was ready to end his life but he had a thought about his dog and the dog's health. He is now attending alcoholics anonymous meetings daily. He states he has support of other members who are attending multiple meetings during the week and more than one on the weekends. He states at last appointment here and elevated LFTs were noted he had been drinking significantly. He readily admits to his alcoholism and apologizes for being deceptive at last appointment. Patient Active Problem List   Diagnosis    Overweight    Family history of coronary artery disease in mother    Chronic vesicular hand dermatitis    Hyperlipidemia    Elevated coronary artery calcium score    Viral warts    Statin myopathy    Special screening for malignant neoplasm of prostate    Degenerative arthritis of knee    Frontal sinusitis    Knee effusion    Essential hypertension    OA (osteoarthritis) of knee    Fasting hyperglycemia    Family history of Alzheimer's disease       Medications listed as ordered at the time of discharge from hospital     Medication List            Accurate as of October 13, 2022  8:20 AM. If you have any questions, ask your nurse or doctor.                 CONTINUE taking these medications      acetaminophen 500 MG tablet  Commonly known as: TYLENOL     amLODIPine 5 MG tablet  Commonly known as: NORVASC     atenolol 100 MG tablet  Commonly known as: TENORMIN     clobetasol 0.05 % ointment  Commonly known as: TEMOVATE  Apply topically 2 times daily     naltrexone 50 MG tablet  Commonly known as: DEPADE     rosuvastatin 20 MG tablet  Commonly known as: CRESTOR     sildenafil 50 MG tablet  Commonly known as: VIAGRA     traZODone 100 MG tablet  Commonly known as: DESYREL                Medications marked \"taking\" at this time  Outpatient Medications Marked as Taking for the 10/13/22 encounter (Office Visit) with IVELISSE Marin NP   Medication Sig Dispense Refill    naltrexone (DEPADE) 50 MG tablet TAKE ONE TABLET BY MOUTH ONE TIME DAILY FOR ALCOHOL DEPENDENCE      traZODone (DESYREL) 100 MG tablet TAKE ONE TABLET BY MOUTH AT BEDTIME FOR INSOMNIA      clobetasol (TEMOVATE) 0.05 % ointment Apply topically 2 times daily 60 g 1    acetaminophen (TYLENOL) 500 MG tablet Take 1,000 mg by mouth every 6 hours      amLODIPine (NORVASC) 5 MG tablet Take 5 mg by mouth daily      atenolol (TENORMIN) 100 MG tablet Take 100 mg by mouth daily      rosuvastatin (CRESTOR) 20 MG tablet Take 20 mg by mouth daily      sildenafil (VIAGRA) 50 MG tablet Take 50 mg by mouth as needed          Medications patient taking as of now reconciled against medications ordered at time of hospital discharge: Yes    A comprehensive review of systems was negative except for: Behavioral/Psych: positive for depression    Objective:    /76 (Site: Left Upper Arm, Position: Sitting, Cuff Size: Large Adult)   Pulse 57   Temp 97.6 °F (36.4 °C) (Temporal)   Wt 224 lb (101.6 kg)   SpO2 96%   BMI 28.76 kg/m²   General Appearance: alert and oriented to person, place and time, well-developed and well-nourished, in no acute distress  Pulmonary/Chest: resps regular and unlabored. Cardiovascular: normal rate and regular rhythm  Extremities: no cyanosis, no clubbing, and no edema  Neurologic: gait and coordination normal, speech normal, and no tremor    An electronic signature was used to authenticate this note.   --IVLEISSE Marin NP

## 2023-02-14 DIAGNOSIS — R93.1 ELEVATED CORONARY ARTERY CALCIUM SCORE: ICD-10-CM

## 2023-02-14 DIAGNOSIS — I10 ESSENTIAL HYPERTENSION: Primary | ICD-10-CM

## 2023-02-14 DIAGNOSIS — E78.2 MIXED HYPERLIPIDEMIA: ICD-10-CM

## 2023-02-21 DIAGNOSIS — R93.1 ELEVATED CORONARY ARTERY CALCIUM SCORE: ICD-10-CM

## 2023-02-21 DIAGNOSIS — I10 ESSENTIAL HYPERTENSION: ICD-10-CM

## 2023-02-21 DIAGNOSIS — E78.2 MIXED HYPERLIPIDEMIA: ICD-10-CM

## 2023-02-21 LAB
ALBUMIN SERPL-MCNC: 4.3 G/DL (ref 3.2–4.6)
ALBUMIN/GLOB SERPL: 1.4 (ref 0.4–1.6)
ALP SERPL-CCNC: 52 U/L (ref 50–136)
ALT SERPL-CCNC: 21 U/L (ref 12–65)
ANION GAP SERPL CALC-SCNC: 3 MMOL/L (ref 2–11)
AST SERPL-CCNC: 14 U/L (ref 15–37)
BILIRUB SERPL-MCNC: 0.7 MG/DL (ref 0.2–1.1)
BUN SERPL-MCNC: 19 MG/DL (ref 8–23)
CALCIUM SERPL-MCNC: 9.6 MG/DL (ref 8.3–10.4)
CHLORIDE SERPL-SCNC: 110 MMOL/L (ref 101–110)
CHOLEST SERPL-MCNC: 128 MG/DL
CO2 SERPL-SCNC: 29 MMOL/L (ref 21–32)
CREAT SERPL-MCNC: 0.9 MG/DL (ref 0.8–1.5)
GLOBULIN SER CALC-MCNC: 3 G/DL (ref 2.8–4.5)
GLUCOSE SERPL-MCNC: 108 MG/DL (ref 65–100)
HDLC SERPL-MCNC: 39 MG/DL (ref 40–60)
HDLC SERPL: 3.3
LDLC SERPL CALC-MCNC: 69 MG/DL
POTASSIUM SERPL-SCNC: 4.7 MMOL/L (ref 3.5–5.1)
PROT SERPL-MCNC: 7.3 G/DL (ref 6.3–8.2)
SODIUM SERPL-SCNC: 142 MMOL/L (ref 133–143)
TRIGL SERPL-MCNC: 100 MG/DL (ref 35–150)
TSH, 3RD GENERATION: 1.65 UIU/ML (ref 0.36–3.74)
VLDLC SERPL CALC-MCNC: 20 MG/DL (ref 6–23)

## 2023-02-27 ENCOUNTER — OFFICE VISIT (OUTPATIENT)
Dept: INTERNAL MEDICINE CLINIC | Facility: CLINIC | Age: 63
End: 2023-02-27
Payer: COMMERCIAL

## 2023-02-27 VITALS
WEIGHT: 245.6 LBS | OXYGEN SATURATION: 99 % | BODY MASS INDEX: 31.53 KG/M2 | SYSTOLIC BLOOD PRESSURE: 130 MMHG | HEART RATE: 56 BPM | DIASTOLIC BLOOD PRESSURE: 76 MMHG

## 2023-02-27 DIAGNOSIS — M15.9 PRIMARY OSTEOARTHRITIS INVOLVING MULTIPLE JOINTS: ICD-10-CM

## 2023-02-27 DIAGNOSIS — R73.01 IMPAIRED FASTING GLUCOSE: ICD-10-CM

## 2023-02-27 DIAGNOSIS — I10 ESSENTIAL HYPERTENSION: ICD-10-CM

## 2023-02-27 DIAGNOSIS — L98.9 NODULAR LESION ON SURFACE OF SKIN: Primary | ICD-10-CM

## 2023-02-27 DIAGNOSIS — Z12.11 ENCOUNTER FOR SCREENING COLONOSCOPY: ICD-10-CM

## 2023-02-27 DIAGNOSIS — E78.2 MIXED HYPERLIPIDEMIA: ICD-10-CM

## 2023-02-27 PROCEDURE — 99214 OFFICE O/P EST MOD 30 MIN: CPT | Performed by: NURSE PRACTITIONER

## 2023-02-27 PROCEDURE — 3074F SYST BP LT 130 MM HG: CPT | Performed by: NURSE PRACTITIONER

## 2023-02-27 PROCEDURE — 3078F DIAST BP <80 MM HG: CPT | Performed by: NURSE PRACTITIONER

## 2023-02-27 RX ORDER — MELOXICAM 7.5 MG/1
7.5 TABLET ORAL DAILY
Qty: 30 TABLET | Refills: 0 | Status: SHIPPED | OUTPATIENT
Start: 2023-02-27

## 2023-02-27 RX ORDER — ATENOLOL 100 MG/1
100 TABLET ORAL DAILY
Qty: 90 TABLET | Refills: 3 | Status: SHIPPED | OUTPATIENT
Start: 2023-02-27

## 2023-02-27 RX ORDER — ROSUVASTATIN CALCIUM 20 MG/1
20 TABLET, COATED ORAL DAILY
Qty: 90 TABLET | Refills: 3 | Status: SHIPPED | OUTPATIENT
Start: 2023-02-27

## 2023-02-27 RX ORDER — AMLODIPINE BESYLATE 5 MG/1
5 TABLET ORAL DAILY
Qty: 90 TABLET | Refills: 3 | Status: SHIPPED | OUTPATIENT
Start: 2023-02-27

## 2023-02-27 SDOH — ECONOMIC STABILITY: FOOD INSECURITY: WITHIN THE PAST 12 MONTHS, THE FOOD YOU BOUGHT JUST DIDN'T LAST AND YOU DIDN'T HAVE MONEY TO GET MORE.: NEVER TRUE

## 2023-02-27 SDOH — ECONOMIC STABILITY: FOOD INSECURITY: WITHIN THE PAST 12 MONTHS, YOU WORRIED THAT YOUR FOOD WOULD RUN OUT BEFORE YOU GOT MONEY TO BUY MORE.: NEVER TRUE

## 2023-02-27 SDOH — ECONOMIC STABILITY: INCOME INSECURITY: HOW HARD IS IT FOR YOU TO PAY FOR THE VERY BASICS LIKE FOOD, HOUSING, MEDICAL CARE, AND HEATING?: HARD

## 2023-02-27 SDOH — ECONOMIC STABILITY: HOUSING INSECURITY
IN THE LAST 12 MONTHS, WAS THERE A TIME WHEN YOU DID NOT HAVE A STEADY PLACE TO SLEEP OR SLEPT IN A SHELTER (INCLUDING NOW)?: NO

## 2023-02-27 ASSESSMENT — PATIENT HEALTH QUESTIONNAIRE - PHQ9
1. LITTLE INTEREST OR PLEASURE IN DOING THINGS: 0
SUM OF ALL RESPONSES TO PHQ QUESTIONS 1-9: 0
SUM OF ALL RESPONSES TO PHQ9 QUESTIONS 1 & 2: 0
SUM OF ALL RESPONSES TO PHQ QUESTIONS 1-9: 0
2. FEELING DOWN, DEPRESSED OR HOPELESS: 0

## 2023-02-27 NOTE — PROGRESS NOTES
PROGRESS NOTE    SUBJECTIVE:   Brittany Webb is a 57 y.o. male seen for a follow up visit for   Chief Complaint   Patient presents with    Hypertension    Cholesterol Problem     \"After last visit I got sick with a respiratory issue. I was so congested in my chest I quit smoking. I am 5 months clean form EtOH and 4.5 months clean from smoking. I have been going to the [employer], a lot of them. I do get thoughts of a drink but I think about the end result. \"    \"My joints are giving my terrible pain. \"    Hypertension    This is a chronic problem. Episode onset: 2000. Risk  factors include male gender, smoking/tobacco exposure and dyslipidemia. Cholesterol Problem  This is a chronic problem. EpisodeThe  problem occurs constantly. The problem has been gradually  improving. Pertinent negatives include no chest pain. Risk factors family history of coronary artery disease in the mother, hypertension, hyperlipidemia. Reviewed and updated this visit by provider:  Tobacco  Allergies  Meds  Problems  Med Hx  Surg Hx  Fam Hx         Review of Systems   Constitutional:  Negative for chills, fatigue and fever. Respiratory:  Negative for chest tightness, shortness of breath and wheezing. Cardiovascular:  Negative for chest pain, palpitations and leg swelling. Gastrointestinal:  Negative for abdominal pain. Endocrine: Negative for polydipsia, polyphagia and polyuria. Genitourinary:  Negative for frequency. Musculoskeletal:  Positive for arthralgias. Negative for gait problem. Skin:  Positive for rash. Negative for color change. Neurological:  Negative for weakness, numbness and headaches. Psychiatric/Behavioral:  Negative for dysphoric mood. The patient is not nervous/anxious.        OBJECTIVE:    /76 (Site: Left Upper Arm, Position: Sitting, Cuff Size: Large Adult)   Pulse 56   Wt 245 lb 9.6 oz (111.4 kg)   SpO2 99%   BMI 31.53 kg/m²      Physical Exam  Vitals and nursing note reviewed. Constitutional:       Appearance: Normal appearance. HENT:      Head: Normocephalic. Mouth/Throat:      Lips: Pink. Mouth: Mucous membranes are moist.   Eyes:      General: Lids are normal.   Neck:      Vascular: No carotid bruit. Cardiovascular:      Rate and Rhythm: Normal rate and regular rhythm. Pulmonary:      Effort: Pulmonary effort is normal.      Breath sounds: Normal breath sounds. Musculoskeletal:      Cervical back: Neck supple. Right lower leg: No edema. Left lower leg: No edema. Skin:     General: Skin is warm and dry. Findings: Lesion (10 mm x 8 mm x 5 mm height lesion) present. Comments: Right facial lesion described as rapid growing. \"It wasn't there the last time I came in. \"   Neurological:      Mental Status: He is alert and oriented to person, place, and time. Mental status is at baseline. Gait: Gait normal.   Psychiatric:         Mood and Affect: Mood normal.         Behavior: Behavior normal.        ASSESSMENT and PLAN    1. Nodular lesion on surface of skin  -     [facility] Bimal Brock [facility], [place]  2. Essential hypertension  -     amLODIPine (NORVASC) 5 MG tablet; Take 1 tablet by mouth daily, Disp-90 tablet, R-3Normal  -     atenolol (TENORMIN) 100 MG tablet; Take 1 tablet by mouth daily, Disp-90 tablet, R-3Normal  -     CBC with Auto Differential; Future  -     Comprehensive Metabolic Panel; Future  3. Mixed hyperlipidemia  -     rosuvastatin (CRESTOR) 20 MG tablet; Take 1 tablet by mouth daily, Disp-90 tablet, R-3Normal  -     Comprehensive Metabolic Panel; Future  -     Lipid Panel; Future  4. Encounter for screening colonoscopy  -     [facility]- Colonoscopy  5. Impaired fasting glucose  -     Comprehensive Metabolic Panel; Future  -     Hemoglobin A1C; Future  6. Primary osteoarthritis involving multiple joints  -     meloxicam (MOBIC) 7.5 MG tablet; Take 1 tablet by mouth daily Take with food. , Disp-30 tablet, R-0Normal      Return in about 4 months (around 6/27/2023) for Follow-Up, HTN, HLD, with labs. current treatment plan is effective, no change in therapy  lab results and schedule of future lab studies reviewed with patient  reviewed diet, exercise and weight control  cardiovascular risk and specific lipid/LDL goals reviewed  reviewed medications and side effects in detail  Refer to dermatology for removal of facial lesion. Refer for screening colonoscopy. On this date 02/27/23 I have spent 35 minutes reviewing previous notes, test results and face to face with the patient. Over 50% of today's office visit was spent in face to face time in counseling reviewing test results, importance of compliance, education about disease process, benefits and side-effects of medications and follow-up plan. IVELISSE Martínez NP    Dictated using voice recognition software.  Proofread, but unrecognized voice recognition errors may exist.

## 2023-03-06 ASSESSMENT — ENCOUNTER SYMPTOMS
COLOR CHANGE: 0
WHEEZING: 0
SHORTNESS OF BREATH: 0
ABDOMINAL PAIN: 0
CHEST TIGHTNESS: 0

## 2023-06-12 NOTE — PROGRESS NOTES
Spiritual care and pre-op prayer given to pt. GABRIELLE Stahl chest wall non-tender, breathing is unlabored without accessory muscle use, normal breath sounds

## 2023-07-03 DIAGNOSIS — R73.01 IMPAIRED FASTING GLUCOSE: ICD-10-CM

## 2023-07-03 DIAGNOSIS — I10 ESSENTIAL HYPERTENSION: ICD-10-CM

## 2023-07-03 DIAGNOSIS — E78.2 MIXED HYPERLIPIDEMIA: ICD-10-CM

## 2023-07-03 LAB
ALBUMIN SERPL-MCNC: 4.2 G/DL (ref 3.2–4.6)
ALBUMIN/GLOB SERPL: 1.4 (ref 0.4–1.6)
ALP SERPL-CCNC: 66 U/L (ref 50–136)
ALT SERPL-CCNC: 24 U/L (ref 12–65)
ANION GAP SERPL CALC-SCNC: 5 MMOL/L (ref 2–11)
AST SERPL-CCNC: 12 U/L (ref 15–37)
BASOPHILS # BLD: 0.1 K/UL (ref 0–0.2)
BASOPHILS NFR BLD: 2 % (ref 0–2)
BILIRUB SERPL-MCNC: 0.6 MG/DL (ref 0.2–1.1)
BUN SERPL-MCNC: 16 MG/DL (ref 8–23)
CALCIUM SERPL-MCNC: 9.4 MG/DL (ref 8.3–10.4)
CHLORIDE SERPL-SCNC: 114 MMOL/L (ref 101–110)
CHOLEST SERPL-MCNC: 139 MG/DL
CO2 SERPL-SCNC: 24 MMOL/L (ref 21–32)
CREAT SERPL-MCNC: 1 MG/DL (ref 0.8–1.5)
DIFFERENTIAL METHOD BLD: NORMAL
EOSINOPHIL # BLD: 0.4 K/UL (ref 0–0.8)
EOSINOPHIL NFR BLD: 7 % (ref 0.5–7.8)
ERYTHROCYTE [DISTWIDTH] IN BLOOD BY AUTOMATED COUNT: 13.9 % (ref 11.9–14.6)
GLOBULIN SER CALC-MCNC: 2.9 G/DL (ref 2.8–4.5)
GLUCOSE SERPL-MCNC: 120 MG/DL (ref 65–100)
HCT VFR BLD AUTO: 49.4 % (ref 41.1–50.3)
HDLC SERPL-MCNC: 49 MG/DL (ref 40–60)
HDLC SERPL: 2.8
HGB BLD-MCNC: 16.4 G/DL (ref 13.6–17.2)
IMM GRANULOCYTES # BLD AUTO: 0 K/UL (ref 0–0.5)
IMM GRANULOCYTES NFR BLD AUTO: 0 % (ref 0–5)
LDLC SERPL CALC-MCNC: 70 MG/DL
LYMPHOCYTES # BLD: 1.7 K/UL (ref 0.5–4.6)
LYMPHOCYTES NFR BLD: 31 % (ref 13–44)
MCH RBC QN AUTO: 31.6 PG (ref 26.1–32.9)
MCHC RBC AUTO-ENTMCNC: 33.2 G/DL (ref 31.4–35)
MCV RBC AUTO: 95.2 FL (ref 82–102)
MONOCYTES # BLD: 0.5 K/UL (ref 0.1–1.3)
MONOCYTES NFR BLD: 10 % (ref 4–12)
NEUTS SEG # BLD: 2.8 K/UL (ref 1.7–8.2)
NEUTS SEG NFR BLD: 50 % (ref 43–78)
NRBC # BLD: 0 K/UL (ref 0–0.2)
PLATELET # BLD AUTO: 186 K/UL (ref 150–450)
PMV BLD AUTO: 10.9 FL (ref 9.4–12.3)
POTASSIUM SERPL-SCNC: 4.4 MMOL/L (ref 3.5–5.1)
PROT SERPL-MCNC: 7.1 G/DL (ref 6.3–8.2)
RBC # BLD AUTO: 5.19 M/UL (ref 4.23–5.6)
SODIUM SERPL-SCNC: 143 MMOL/L (ref 133–143)
TRIGL SERPL-MCNC: 100 MG/DL (ref 35–150)
VLDLC SERPL CALC-MCNC: 20 MG/DL (ref 6–23)
WBC # BLD AUTO: 5.6 K/UL (ref 4.3–11.1)

## 2023-07-04 LAB
EST. AVERAGE GLUCOSE BLD GHB EST-MCNC: 117 MG/DL
HBA1C MFR BLD: 5.7 % (ref 4.8–5.6)

## 2023-07-06 PROBLEM — K63.5 HYPERPLASTIC POLYP OF SIGMOID COLON: Status: ACTIVE | Noted: 2023-05-19

## 2023-07-07 ENCOUNTER — OFFICE VISIT (OUTPATIENT)
Dept: INTERNAL MEDICINE CLINIC | Facility: CLINIC | Age: 63
End: 2023-07-07
Payer: COMMERCIAL

## 2023-07-07 VITALS
DIASTOLIC BLOOD PRESSURE: 70 MMHG | SYSTOLIC BLOOD PRESSURE: 132 MMHG | BODY MASS INDEX: 33.1 KG/M2 | WEIGHT: 257.8 LBS | HEART RATE: 64 BPM | OXYGEN SATURATION: 94 %

## 2023-07-07 DIAGNOSIS — I34.0 MITRAL VALVE INSUFFICIENCY, UNSPECIFIED ETIOLOGY: ICD-10-CM

## 2023-07-07 DIAGNOSIS — Z12.5 ENCOUNTER FOR SCREENING FOR MALIGNANT NEOPLASM OF PROSTATE: ICD-10-CM

## 2023-07-07 DIAGNOSIS — R60.0 BILATERAL LOWER EXTREMITY EDEMA: ICD-10-CM

## 2023-07-07 DIAGNOSIS — I77.810 ASCENDING AORTA DILATION (HCC): ICD-10-CM

## 2023-07-07 DIAGNOSIS — I10 ESSENTIAL HYPERTENSION: Primary | Chronic | ICD-10-CM

## 2023-07-07 DIAGNOSIS — E78.2 MIXED HYPERLIPIDEMIA: ICD-10-CM

## 2023-07-07 DIAGNOSIS — R73.01 FASTING HYPERGLYCEMIA: ICD-10-CM

## 2023-07-07 PROCEDURE — 3078F DIAST BP <80 MM HG: CPT | Performed by: NURSE PRACTITIONER

## 2023-07-07 PROCEDURE — 99214 OFFICE O/P EST MOD 30 MIN: CPT | Performed by: NURSE PRACTITIONER

## 2023-07-07 PROCEDURE — 3074F SYST BP LT 130 MM HG: CPT | Performed by: NURSE PRACTITIONER

## 2023-07-07 ASSESSMENT — PATIENT HEALTH QUESTIONNAIRE - PHQ9
SUM OF ALL RESPONSES TO PHQ QUESTIONS 1-9: 0
1. LITTLE INTEREST OR PLEASURE IN DOING THINGS: 0
2. FEELING DOWN, DEPRESSED OR HOPELESS: 0
SUM OF ALL RESPONSES TO PHQ QUESTIONS 1-9: 0
SUM OF ALL RESPONSES TO PHQ9 QUESTIONS 1 & 2: 0
SUM OF ALL RESPONSES TO PHQ QUESTIONS 1-9: 0
SUM OF ALL RESPONSES TO PHQ QUESTIONS 1-9: 0

## 2023-07-23 ASSESSMENT — ENCOUNTER SYMPTOMS
ABDOMINAL PAIN: 0
SHORTNESS OF BREATH: 0
CHEST TIGHTNESS: 0
WHEEZING: 0

## 2023-07-27 ENCOUNTER — APPOINTMENT (OUTPATIENT)
Dept: CT IMAGING | Age: 63
End: 2023-07-27
Payer: COMMERCIAL

## 2023-07-27 ENCOUNTER — HOSPITAL ENCOUNTER (OUTPATIENT)
Age: 63
Setting detail: OBSERVATION
Discharge: HOME | End: 2023-07-28
Attending: EMERGENCY MEDICINE | Admitting: INTERNAL MEDICINE
Payer: COMMERCIAL

## 2023-07-27 ENCOUNTER — APPOINTMENT (OUTPATIENT)
Dept: MRI IMAGING | Age: 63
End: 2023-07-27
Payer: COMMERCIAL

## 2023-07-27 DIAGNOSIS — G45.4 TGA (TRANSIENT GLOBAL AMNESIA): ICD-10-CM

## 2023-07-27 DIAGNOSIS — R41.3 MEMORY LOSS, SHORT TERM: Primary | ICD-10-CM

## 2023-07-27 PROBLEM — I34.0 MITRAL REGURGITATION: Status: ACTIVE | Noted: 2023-07-27

## 2023-07-27 PROBLEM — I51.89 DIASTOLIC DYSFUNCTION: Status: ACTIVE | Noted: 2023-07-27

## 2023-07-27 LAB
ALBUMIN SERPL-MCNC: 4.3 G/DL (ref 3.2–4.6)
ALBUMIN/GLOB SERPL: 1.3 (ref 0.4–1.6)
ALP SERPL-CCNC: 59 U/L (ref 50–136)
ALT SERPL-CCNC: 26 U/L (ref 12–65)
AMPHET UR QL SCN: NEGATIVE
ANION GAP SERPL CALC-SCNC: 6 MMOL/L (ref 2–11)
AST SERPL-CCNC: 23 U/L (ref 15–37)
BARBITURATES UR QL SCN: NEGATIVE
BASOPHILS # BLD: 0.1 K/UL (ref 0–0.2)
BASOPHILS NFR BLD: 1 % (ref 0–2)
BENZODIAZ UR QL: NEGATIVE
BILIRUB SERPL-MCNC: 0.6 MG/DL (ref 0.2–1.1)
BUN SERPL-MCNC: 14 MG/DL (ref 8–23)
CALCIUM SERPL-MCNC: 9.3 MG/DL (ref 8.3–10.4)
CANNABINOIDS UR QL SCN: NEGATIVE
CHLORIDE SERPL-SCNC: 111 MMOL/L (ref 101–110)
CO2 SERPL-SCNC: 23 MMOL/L (ref 21–32)
COCAINE UR QL SCN: NEGATIVE
CREAT SERPL-MCNC: 0.87 MG/DL (ref 0.8–1.5)
DIFFERENTIAL METHOD BLD: ABNORMAL
EKG ATRIAL RATE: 62 BPM
EKG DIAGNOSIS: NORMAL
EKG P AXIS: 51 DEGREES
EKG P-R INTERVAL: 178 MS
EKG Q-T INTERVAL: 419 MS
EKG QRS DURATION: 90 MS
EKG QTC CALCULATION (BAZETT): 426 MS
EKG R AXIS: 36 DEGREES
EKG T AXIS: 74 DEGREES
EKG VENTRICULAR RATE: 62 BPM
EOSINOPHIL # BLD: 0.1 K/UL (ref 0–0.8)
EOSINOPHIL NFR BLD: 1 % (ref 0.5–7.8)
ERYTHROCYTE [DISTWIDTH] IN BLOOD BY AUTOMATED COUNT: 13.3 % (ref 11.9–14.6)
ETHANOL SERPL-MCNC: <3 MG/DL (ref 0–0.08)
GLOBULIN SER CALC-MCNC: 3.4 G/DL (ref 2.8–4.5)
GLUCOSE SERPL-MCNC: 133 MG/DL (ref 65–100)
HCT VFR BLD AUTO: 47.4 % (ref 41.1–50.3)
HGB BLD-MCNC: 16.5 G/DL (ref 13.6–17.2)
IMM GRANULOCYTES # BLD AUTO: 0 K/UL (ref 0–0.5)
IMM GRANULOCYTES NFR BLD AUTO: 0 % (ref 0–5)
LYMPHOCYTES # BLD: 0.9 K/UL (ref 0.5–4.6)
LYMPHOCYTES NFR BLD: 10 % (ref 13–44)
MAGNESIUM SERPL-MCNC: 2.1 MG/DL (ref 1.8–2.4)
MCH RBC QN AUTO: 31.9 PG (ref 26.1–32.9)
MCHC RBC AUTO-ENTMCNC: 34.8 G/DL (ref 31.4–35)
MCV RBC AUTO: 91.7 FL (ref 82–102)
METHADONE UR QL: NEGATIVE
MONOCYTES # BLD: 0.5 K/UL (ref 0.1–1.3)
MONOCYTES NFR BLD: 6 % (ref 4–12)
NEUTS SEG # BLD: 7.1 K/UL (ref 1.7–8.2)
NEUTS SEG NFR BLD: 82 % (ref 43–78)
NRBC # BLD: 0 K/UL (ref 0–0.2)
OPIATES UR QL: NEGATIVE
PCP UR QL: NEGATIVE
PLATELET # BLD AUTO: 198 K/UL (ref 150–450)
PMV BLD AUTO: 9.8 FL (ref 9.4–12.3)
POTASSIUM SERPL-SCNC: 3.8 MMOL/L (ref 3.5–5.1)
PROT SERPL-MCNC: 7.7 G/DL (ref 6.3–8.2)
RBC # BLD AUTO: 5.17 M/UL (ref 4.23–5.6)
SODIUM SERPL-SCNC: 140 MMOL/L (ref 133–143)
WBC # BLD AUTO: 8.6 K/UL (ref 4.3–11.1)

## 2023-07-27 PROCEDURE — 70551 MRI BRAIN STEM W/O DYE: CPT

## 2023-07-27 PROCEDURE — 93005 ELECTROCARDIOGRAM TRACING: CPT | Performed by: EMERGENCY MEDICINE

## 2023-07-27 PROCEDURE — 2580000003 HC RX 258: Performed by: INTERNAL MEDICINE

## 2023-07-27 PROCEDURE — 99285 EMERGENCY DEPT VISIT HI MDM: CPT

## 2023-07-27 PROCEDURE — G0378 HOSPITAL OBSERVATION PER HR: HCPCS

## 2023-07-27 PROCEDURE — 82077 ASSAY SPEC XCP UR&BREATH IA: CPT

## 2023-07-27 PROCEDURE — 80307 DRUG TEST PRSMV CHEM ANLYZR: CPT

## 2023-07-27 PROCEDURE — 6360000002 HC RX W HCPCS: Performed by: INTERNAL MEDICINE

## 2023-07-27 PROCEDURE — 93010 ELECTROCARDIOGRAM REPORT: CPT | Performed by: INTERNAL MEDICINE

## 2023-07-27 PROCEDURE — 92523 SPEECH SOUND LANG COMPREHEN: CPT

## 2023-07-27 PROCEDURE — 70450 CT HEAD/BRAIN W/O DYE: CPT

## 2023-07-27 PROCEDURE — 6370000000 HC RX 637 (ALT 250 FOR IP): Performed by: INTERNAL MEDICINE

## 2023-07-27 PROCEDURE — 83735 ASSAY OF MAGNESIUM: CPT

## 2023-07-27 PROCEDURE — 85025 COMPLETE CBC W/AUTO DIFF WBC: CPT

## 2023-07-27 PROCEDURE — 80053 COMPREHEN METABOLIC PANEL: CPT

## 2023-07-27 RX ORDER — POLYETHYLENE GLYCOL 3350 17 G/17G
17 POWDER, FOR SOLUTION ORAL DAILY PRN
Status: DISCONTINUED | OUTPATIENT
Start: 2023-07-27 | End: 2023-07-28 | Stop reason: HOSPADM

## 2023-07-27 RX ORDER — ROSUVASTATIN CALCIUM 20 MG/1
20 TABLET, COATED ORAL DAILY
Status: DISCONTINUED | OUTPATIENT
Start: 2023-07-27 | End: 2023-07-28 | Stop reason: HOSPADM

## 2023-07-27 RX ORDER — SODIUM CHLORIDE 0.9 % (FLUSH) 0.9 %
5-40 SYRINGE (ML) INJECTION PRN
Status: DISCONTINUED | OUTPATIENT
Start: 2023-07-27 | End: 2023-07-28 | Stop reason: HOSPADM

## 2023-07-27 RX ORDER — AMLODIPINE BESYLATE 5 MG/1
5 TABLET ORAL DAILY
Status: DISCONTINUED | OUTPATIENT
Start: 2023-07-27 | End: 2023-07-28 | Stop reason: HOSPADM

## 2023-07-27 RX ORDER — SODIUM CHLORIDE 0.9 % (FLUSH) 0.9 %
5-40 SYRINGE (ML) INJECTION EVERY 12 HOURS SCHEDULED
Status: DISCONTINUED | OUTPATIENT
Start: 2023-07-27 | End: 2023-07-28 | Stop reason: HOSPADM

## 2023-07-27 RX ORDER — DEXTROSE AND SODIUM CHLORIDE 5; .45 G/100ML; G/100ML
INJECTION, SOLUTION INTRAVENOUS CONTINUOUS
Status: DISCONTINUED | OUTPATIENT
Start: 2023-07-27 | End: 2023-07-27

## 2023-07-27 RX ORDER — SODIUM CHLORIDE 9 MG/ML
INJECTION, SOLUTION INTRAVENOUS PRN
Status: DISCONTINUED | OUTPATIENT
Start: 2023-07-27 | End: 2023-07-28 | Stop reason: HOSPADM

## 2023-07-27 RX ORDER — ONDANSETRON 4 MG/1
4 TABLET, ORALLY DISINTEGRATING ORAL EVERY 8 HOURS PRN
Status: DISCONTINUED | OUTPATIENT
Start: 2023-07-27 | End: 2023-07-28 | Stop reason: HOSPADM

## 2023-07-27 RX ORDER — ACETAMINOPHEN 650 MG/1
650 SUPPOSITORY RECTAL EVERY 6 HOURS PRN
Status: DISCONTINUED | OUTPATIENT
Start: 2023-07-27 | End: 2023-07-28 | Stop reason: HOSPADM

## 2023-07-27 RX ORDER — ONDANSETRON 2 MG/ML
4 INJECTION INTRAMUSCULAR; INTRAVENOUS EVERY 6 HOURS PRN
Status: DISCONTINUED | OUTPATIENT
Start: 2023-07-27 | End: 2023-07-28 | Stop reason: HOSPADM

## 2023-07-27 RX ORDER — ENOXAPARIN SODIUM 100 MG/ML
30 INJECTION SUBCUTANEOUS 2 TIMES DAILY
Status: DISCONTINUED | OUTPATIENT
Start: 2023-07-27 | End: 2023-07-28 | Stop reason: HOSPADM

## 2023-07-27 RX ORDER — ATENOLOL 50 MG/1
100 TABLET ORAL DAILY
Status: DISCONTINUED | OUTPATIENT
Start: 2023-07-27 | End: 2023-07-28

## 2023-07-27 RX ORDER — ACETAMINOPHEN 325 MG/1
650 TABLET ORAL EVERY 6 HOURS PRN
Status: DISCONTINUED | OUTPATIENT
Start: 2023-07-27 | End: 2023-07-28 | Stop reason: HOSPADM

## 2023-07-27 RX ADMIN — SODIUM CHLORIDE, PRESERVATIVE FREE 10 ML: 5 INJECTION INTRAVENOUS at 22:53

## 2023-07-27 RX ADMIN — ENOXAPARIN SODIUM 30 MG: 100 INJECTION SUBCUTANEOUS at 22:53

## 2023-07-27 RX ADMIN — DEXTROSE AND SODIUM CHLORIDE: 5; 450 INJECTION, SOLUTION INTRAVENOUS at 11:40

## 2023-07-27 RX ADMIN — SODIUM CHLORIDE, PRESERVATIVE FREE 10 ML: 5 INJECTION INTRAVENOUS at 11:42

## 2023-07-27 RX ADMIN — ROSUVASTATIN 20 MG: 20 TABLET, FILM COATED ORAL at 11:44

## 2023-07-27 RX ADMIN — ENOXAPARIN SODIUM 30 MG: 100 INJECTION SUBCUTANEOUS at 11:45

## 2023-07-27 RX ADMIN — AMLODIPINE BESYLATE 5 MG: 5 TABLET ORAL at 11:44

## 2023-07-27 ASSESSMENT — ENCOUNTER SYMPTOMS
GASTROINTESTINAL NEGATIVE: 1
BACK PAIN: 0
RESPIRATORY NEGATIVE: 1

## 2023-07-27 ASSESSMENT — PAIN SCALES - GENERAL: PAINLEVEL_OUTOF10: 0

## 2023-07-27 ASSESSMENT — PAIN - FUNCTIONAL ASSESSMENT: PAIN_FUNCTIONAL_ASSESSMENT: 0-10

## 2023-07-27 NOTE — ED NOTES
TRANSFER - OUT REPORT:    Verbal report given to Fidel Carvajal on Leontine Kanner German  being transferred to [address] [place] [address] for urgent transfer       Report consisted of patient's Situation, Background, Assessment and   Recommendations(SBAR). Information from the following report(s) Nurse Handoff Report, Index, ED Encounter Summary, ED SBAR, Adult Overview, MAR, Recent Results, and Med Rec Status was reviewed with the receiving nurse. Ceres Fall Assessment:    Presents to emergency department  because of falls (Syncope, seizure, or loss of consciousness): No  Age > 70: No  Altered Mental Status, Intoxication with alcohol or substance confusion (Disorientation, impaired judgment, poor safety awaremess, or inability to follow instructions): No  Impaired Mobility: Ambulates or transfers with assistive devices or assistance; Unable to ambulate or transer.: No  Nursing Judgement: No          Lines:   Peripheral IV 07/27/23 Right Antecubital (Active)        Opportunity for questions and clarification was provided. Patient transported with:  Transport.            Alton Reeves RN  07/27/23 6424

## 2023-07-27 NOTE — PROGRESS NOTES
SPEECH PATHOLOGY NOTE:    Speech therapy consult received and appreciated. Attempted to see patient for cognitive linguistic assessment this PM. Patient is currently off the floor for MRI. Will re-attempt at later time/date.       Gisell Razo MS, CCC-SLP

## 2023-07-27 NOTE — H&P
evidence of acute intracranial abnormality. Transthoracic echocardiogram (TTE) complete with contrast, bubble, strain, and 3D PRN    Result Date: 7/27/2023    Left Ventricle: Normal left ventricular systolic function with a visually estimated EF of 55 - 60%. Left ventricle size is normal. Mildly increased wall thickness. Normal wall motion. Abnormal diastolic function. Aortic Valve: Mildly calcified cusp. No regurgitation. Noted by the apical view. AV mean gradient is 12 mmHg. AV peak gradient is 23 mmHg. AV peak velocity is 2.4 m/s. LVOT diameter is 2.4 cm. AV area by continuity VTI is 2.2 cm2. AV Stroke Volume index is 48.2 mL/m2. Mitral Valve: Mild regurgitation. Left Atrium: Left atrium is moderately dilated. Right Atrium: Right atrium is mildly dilated. Aorta: Mildly dilated aortic root. Echocardiogram:  07/26/23    TRANSTHORACIC ECHOCARDIOGRAM (TTE) COMPLETE (CONTRAST/BUBBLE/3D PRN) 07/27/2023  8:45 AM (Final)    Interpretation Summary    Left Ventricle: Normal left ventricular systolic function with a visually estimated EF of 55 - 60%. Left ventricle size is normal. Mildly increased wall thickness. Normal wall motion. Abnormal diastolic function. Aortic Valve: Mildly calcified cusp. No regurgitation. Noted by the apical view. AV mean gradient is 12 mmHg. AV peak gradient is 23 mmHg. AV peak velocity is 2.4 m/s. LVOT diameter is 2.4 cm. AV area by continuity VTI is 2.2 cm2. AV Stroke Volume index is 48.2 mL/m2. Mitral Valve: Mild regurgitation. Left Atrium: Left atrium is moderately dilated. Right Atrium: Right atrium is mildly dilated. Aorta: Mildly dilated aortic root. Signed by: Kenroy Quiñones MD on 7/27/2023  8:45 AM        No orders of the defined types were placed in this encounter. Signed:  Caryn Watson DO  [facility]  7/27/2023    Part of this note may have been written by using a voice dictation software.   The note has been proof

## 2023-07-27 NOTE — PROGRESS NOTES
Please complete and sign MRI screening form. If patient is claustrophobic please have medication ordered.

## 2023-07-27 NOTE — PROGRESS NOTES
TRANSFER - IN REPORT:    Verbal report received from KOFI Batres on Eliezer Clark  being received from ED for routine progression of patient care      Report consisted of patient's Situation, Background, Assessment and   Recommendations(SBAR). Information from the following report(s) Nurse Handoff Report, Index, and ED Encounter Summary was reviewed with the receiving nurse. Opportunity for questions and clarification was provided. Assessment completed upon patient's arrival to unit and care assumed.
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
## 2023-07-27 NOTE — PROGRESS NOTES
SPEECH LANGUAGE PATHOLOGY: COMMUNICATION Initial Assessment and Discharge    MRN: [id number]    ADMISSION DATE: 2023  PRIMARY DIAGNOSIS: TGA (transient global amnesia)  TGA (transient global amnesia) [G45.4]  Memory loss, short term [R41.3]    ICD-10: Treatment Diagnosis: R41.841 Cognitive-Communication Deficit    RECOMMENDATIONS:   Recommendations: Consider higher level cognitive assessment with speech therapy at next level of care if cognitive function does not return to baseline. Patient continues to require skilled intervention: No. Please re-consult if new concerns arise. Patient states he is not interested in outpatient therapy at this time, but will contact primary care doctor for consult if concerned. ASSESSMENT         Patient completed the [facility] Mental Status Examination (UMS) scoring within the \"mild neurocognitive disorder\" range. He exhibited moderate difficulty with delayed recall task. Orientation, problem solving, and basic reasoning appear intact. Recommenced higher level cognitive assessment with speech therapy at next level of care. Patient politely declined outpatient or home health speech therapy at this time, but states he will consider this option if memory does not return to baseline. GENERAL   Subjective: Alert upright in bed for assessment. Pleasant and friendly.        Communication Observation: Functional  Follows Directions: Complex  Respiratory Status: Room air               Prior Dysphagia History: Na       Pain:   Patient does not c/o pain, Patient does not appear in pain                                          OBJECTIVE   The [address] Status(UMS) Examination:  Total score: 26/30    Orientation-3/3  Recall(5 words)- immediate: 5/5; delayed:2/5  Problem solving:3/3  Divergent naming(concrete category): 3/3  Digit manipulation: 1/2  Visuospatial: 2/2  Clock drawin/4  Immediate recall (passage)-    Scorin

## 2023-07-27 NOTE — CARE COORDINATION
CM spoke w/ patient at bedside for discharge planning. Patient lives alone. He is independent at baseline; ambulates without assistive device; drives. No DME in the home. CM following for discharge needs. 07/27/23 7419   Service Assessment   Patient Orientation Alert and Oriented   Cognition Alert   History Provided By Patient   Primary Caregiver Self   Support Systems Family Members   PCP Verified by CM Yes   Prior Functional Level Independent in ADLs/IADLs   Current Functional Level Other (see comment)   Can patient return to prior living arrangement Unknown at present   Ability to make needs known: Good   Family able to assist with home care needs: Yes   Would you like for me to discuss the discharge plan with any other family members/significant others, and if so, who? No   Condition of Participation: Discharge Planning   The Plan for Transition of Care is related to the following treatment goals: Return home.

## 2023-07-27 NOTE — ICUWATCH
RRT Clinical Rounding Nurse Assistance    Called to assist primary RN with IV start. 20g ISACC with ultrasound guidance. Primary RN at bedside.     Jesus Fletcher, [address]: [address]: [phone]

## 2023-07-27 NOTE — ED TRIAGE NOTES
Patient ambulatory to triage with son. Patient states that this morning he has a difficult remembering thing. Patient states that he has had a difficult time understanding the difference between reality and dreams.

## 2023-07-27 NOTE — CARE COORDINATION
Admit order noted. SW went to meet with patient and his son at the bedside. Patient not in his room. CRISTIANO advised that the patient had transferred to [address] [place] [address].      Son Ronquillo LMSW    [facility]

## 2023-07-28 ENCOUNTER — TELEPHONE (OUTPATIENT)
Dept: INTERNAL MEDICINE CLINIC | Facility: CLINIC | Age: 63
End: 2023-07-28

## 2023-07-28 VITALS
HEIGHT: 74 IN | OXYGEN SATURATION: 91 % | BODY MASS INDEX: 32.98 KG/M2 | TEMPERATURE: 97.7 F | SYSTOLIC BLOOD PRESSURE: 151 MMHG | WEIGHT: 257 LBS | DIASTOLIC BLOOD PRESSURE: 91 MMHG | HEART RATE: 64 BPM | RESPIRATION RATE: 16 BRPM

## 2023-07-28 LAB
ANION GAP SERPL CALC-SCNC: 8 MMOL/L (ref 2–11)
BASOPHILS # BLD: 0.1 K/UL (ref 0–0.2)
BASOPHILS NFR BLD: 1 % (ref 0–2)
BUN SERPL-MCNC: 14 MG/DL (ref 8–23)
CALCIUM SERPL-MCNC: 8.8 MG/DL (ref 8.3–10.4)
CHLORIDE SERPL-SCNC: 110 MMOL/L (ref 101–110)
CO2 SERPL-SCNC: 25 MMOL/L (ref 21–32)
CREAT SERPL-MCNC: 0.84 MG/DL (ref 0.8–1.5)
DIFFERENTIAL METHOD BLD: ABNORMAL
EOSINOPHIL # BLD: 0.3 K/UL (ref 0–0.8)
EOSINOPHIL NFR BLD: 6 % (ref 0.5–7.8)
ERYTHROCYTE [DISTWIDTH] IN BLOOD BY AUTOMATED COUNT: 13.5 % (ref 11.9–14.6)
GLUCOSE SERPL-MCNC: 122 MG/DL (ref 65–100)
HCT VFR BLD AUTO: 45.9 % (ref 41.1–50.3)
HGB BLD-MCNC: 16.4 G/DL (ref 13.6–17.2)
IMM GRANULOCYTES # BLD AUTO: 0 K/UL (ref 0–0.5)
IMM GRANULOCYTES NFR BLD AUTO: 0 % (ref 0–5)
LYMPHOCYTES # BLD: 1.4 K/UL (ref 0.5–4.6)
LYMPHOCYTES NFR BLD: 24 % (ref 13–44)
MCH RBC QN AUTO: 33 PG (ref 26.1–32.9)
MCHC RBC AUTO-ENTMCNC: 35.7 G/DL (ref 31.4–35)
MCV RBC AUTO: 92.4 FL (ref 82–102)
MONOCYTES # BLD: 0.5 K/UL (ref 0.1–1.3)
MONOCYTES NFR BLD: 9 % (ref 4–12)
NEUTS SEG # BLD: 3.5 K/UL (ref 1.7–8.2)
NEUTS SEG NFR BLD: 60 % (ref 43–78)
NRBC # BLD: 0 K/UL (ref 0–0.2)
PLATELET # BLD AUTO: 215 K/UL (ref 150–450)
PMV BLD AUTO: 10.2 FL (ref 9.4–12.3)
POTASSIUM SERPL-SCNC: 4 MMOL/L (ref 3.5–5.1)
RBC # BLD AUTO: 4.97 M/UL (ref 4.23–5.6)
SODIUM SERPL-SCNC: 143 MMOL/L (ref 133–143)
WBC # BLD AUTO: 5.8 K/UL (ref 4.3–11.1)

## 2023-07-28 PROCEDURE — 36415 COLL VENOUS BLD VENIPUNCTURE: CPT

## 2023-07-28 PROCEDURE — 80048 BASIC METABOLIC PNL TOTAL CA: CPT

## 2023-07-28 PROCEDURE — 85025 COMPLETE CBC W/AUTO DIFF WBC: CPT

## 2023-07-28 PROCEDURE — 6370000000 HC RX 637 (ALT 250 FOR IP): Performed by: INTERNAL MEDICINE

## 2023-07-28 PROCEDURE — 2580000003 HC RX 258: Performed by: INTERNAL MEDICINE

## 2023-07-28 PROCEDURE — G0378 HOSPITAL OBSERVATION PER HR: HCPCS

## 2023-07-28 PROCEDURE — 99222 1ST HOSP IP/OBS MODERATE 55: CPT | Performed by: PSYCHIATRY & NEUROLOGY

## 2023-07-28 RX ORDER — ASPIRIN 81 MG/1
81 TABLET, CHEWABLE ORAL DAILY
Qty: 1 TABLET | Refills: 0
Start: 2023-07-28

## 2023-07-28 RX ORDER — ASPIRIN 81 MG/1
81 TABLET, CHEWABLE ORAL DAILY
Status: DISCONTINUED | OUTPATIENT
Start: 2023-07-28 | End: 2023-07-28 | Stop reason: HOSPADM

## 2023-07-28 RX ORDER — ATENOLOL 25 MG/1
25 TABLET ORAL DAILY
Status: DISCONTINUED | OUTPATIENT
Start: 2023-07-28 | End: 2023-07-28 | Stop reason: HOSPADM

## 2023-07-28 RX ADMIN — AMLODIPINE BESYLATE 5 MG: 5 TABLET ORAL at 08:37

## 2023-07-28 RX ADMIN — SODIUM CHLORIDE, PRESERVATIVE FREE 10 ML: 5 INJECTION INTRAVENOUS at 08:38

## 2023-07-28 RX ADMIN — ROSUVASTATIN 20 MG: 20 TABLET, FILM COATED ORAL at 08:37

## 2023-07-28 RX ADMIN — ASPIRIN 81 MG 81 MG: 81 TABLET ORAL at 10:00

## 2023-07-28 RX ADMIN — ATENOLOL 25 MG: 25 TABLET ORAL at 08:36

## 2023-07-28 NOTE — DISCHARGE SUMMARY
ROM.  Trachea midline  CV:   RRR. No m/r/g. No JVD  Lungs:   CTAB. No wheezing, rhonchi, or rales. Respirations even, unlabored  Abdomen:   Soft, nontender, nondistended. Extremities: Warm and dry. No cyanosis or clubbing. No edema. Skin:     No rashes. Normal coloration  Neuro:  CN II-XII grossly intact. Psych:  Normal mood and affect. Signed:  Jeannette Morrow DO  7/28/2023    Part of this note may have been written by using a voice dictation software. The note has been proof read but may still contain some grammatical/other typographical errors.

## 2023-07-28 NOTE — PLAN OF CARE
Problem: Discharge Planning  Goal: Discharge to home or other facility with appropriate resources  7/28/2023 0214 by Kaylene Bryd RN  Outcome: Progressing  7/28/2023 0213 by Kaylene Byrd RN  Outcome: Progressing     Problem: Pain  Goal: Verbalizes/displays adequate comfort level or baseline comfort level  7/28/2023 0214 by Kaylene Byrd RN  Outcome: Progressing  7/28/2023 0213 by Kaylene Byrd RN  Outcome: Progressing
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
## 2023-07-28 NOTE — CARE COORDINATION
Patient to be discharged home today. CM spoke w/ patient at bedside. He declines outpatient ST referral at this time. No further discharge needs identified. CM available until discharge. 07/28/23 [id number]   Service Assessment   Patient Orientation Alert and Oriented   Services At/After Discharge   Services At/After Discharge None   Mode of Transport at Discharge Self   Confirm Follow Up Transport Self   Condition of Participation: Discharge Planning   The Plan for Transition of Care is related to the following treatment goals: Return home.

## 2023-07-28 NOTE — PLAN OF CARE
Problem: Discharge Planning  Goal: Discharge to home or other facility with appropriate resources  7/28/2023 0216 by Damaso Vogel RN  Outcome: Progressing  7/28/2023 0214 by Damaso Vogel RN  Outcome: Progressing  7/28/2023 0213 by Damaso Vogel RN  Outcome: Progressing     Problem: Pain  Goal: Verbalizes/displays adequate comfort level or baseline comfort level  7/28/2023 0216 by Damaso Vogel RN  Outcome: Progressing  7/28/2023 0214 by Damaso Vogel RN  Outcome: Progressing  7/28/2023 0213 by Damaso Vogel RN  Outcome: Progressing

## 2023-07-28 NOTE — TELEPHONE ENCOUNTER
Care Transitions Initial Follow Up Call    Outreach made within 2 business days of discharge: Yes    Patient: Khloe Doan Kiswahili Patient : 1960   MRN: [id number]  Reason for Admission: Altered Mental Status  Discharge Date: 23       Spoke with: 1st att LVM/ 2nd attempt to contact patient 23 LVM/ 3rd attempt patient states he will contact [facility] to discuss scheduling appt    Discharge department/facility: [facility] Patient Contact:  Was patient able to fill all prescriptions: Yes  Was patient instructed to bring all medications to the follow-up visit: Yes  Is patient taking all medications as directed in the discharge summary?  Yes  Does patient understand their discharge instructions: Yes  Does patient have questions or concerns that need addressed prior to 7-14 day follow up office visit: no    Scheduled appointment with PCP within 7-14 days    Follow Up  Future Appointments   Date Time Provider [address] [place] [address]   10/13/2023  1:30 PM IVELISSE Doll NP [facility]  [place]

## 2023-07-31 NOTE — TELEPHONE ENCOUNTER
2nd attempt to contact patient using phone number listed in chart LVM    3rd attempt to contact patient using phone number listed in chart patient feels he does not need appt and will contact Alessia Benton NP at her office to discuss.

## 2023-10-06 DIAGNOSIS — I10 ESSENTIAL HYPERTENSION: Chronic | ICD-10-CM

## 2023-10-06 DIAGNOSIS — E78.2 MIXED HYPERLIPIDEMIA: ICD-10-CM

## 2023-10-06 DIAGNOSIS — Z12.5 ENCOUNTER FOR SCREENING FOR MALIGNANT NEOPLASM OF PROSTATE: ICD-10-CM

## 2023-10-06 LAB — PSA SERPL-MCNC: 2.1 NG/ML

## 2023-10-07 LAB
ALBUMIN SERPL-MCNC: 4.4 G/DL (ref 3.2–4.6)
ALBUMIN/GLOB SERPL: 1.5 (ref 0.4–1.6)
ALP SERPL-CCNC: 65 U/L (ref 50–136)
ALT SERPL-CCNC: 33 U/L (ref 12–65)
ANION GAP SERPL CALC-SCNC: 7 MMOL/L (ref 2–11)
AST SERPL-CCNC: 19 U/L (ref 15–37)
BILIRUB SERPL-MCNC: 0.5 MG/DL (ref 0.2–1.1)
BUN SERPL-MCNC: 13 MG/DL (ref 8–23)
CALCIUM SERPL-MCNC: 8.9 MG/DL (ref 8.3–10.4)
CHLORIDE SERPL-SCNC: 114 MMOL/L (ref 101–110)
CHOLEST SERPL-MCNC: 116 MG/DL
CO2 SERPL-SCNC: 24 MMOL/L (ref 21–32)
CREAT SERPL-MCNC: 0.9 MG/DL (ref 0.8–1.5)
GLOBULIN SER CALC-MCNC: 2.9 G/DL (ref 2.8–4.5)
GLUCOSE SERPL-MCNC: 104 MG/DL (ref 65–100)
HDLC SERPL-MCNC: 45 MG/DL (ref 40–60)
HDLC SERPL: 2.6
LDLC SERPL CALC-MCNC: 54.8 MG/DL
POTASSIUM SERPL-SCNC: 4.7 MMOL/L (ref 3.5–5.1)
PROT SERPL-MCNC: 7.3 G/DL (ref 6.3–8.2)
SODIUM SERPL-SCNC: 145 MMOL/L (ref 133–143)
TRIGL SERPL-MCNC: 81 MG/DL (ref 35–150)
VLDLC SERPL CALC-MCNC: 16.2 MG/DL (ref 6–23)

## 2023-10-13 ENCOUNTER — OFFICE VISIT (OUTPATIENT)
Dept: INTERNAL MEDICINE CLINIC | Facility: CLINIC | Age: 63
End: 2023-10-13
Payer: COMMERCIAL

## 2023-10-13 ENCOUNTER — HOSPITAL ENCOUNTER (OUTPATIENT)
Dept: GENERAL RADIOLOGY | Age: 63
Discharge: HOME | End: 2023-10-16

## 2023-10-13 VITALS
HEIGHT: 74 IN | BODY MASS INDEX: 33.03 KG/M2 | WEIGHT: 257.4 LBS | SYSTOLIC BLOOD PRESSURE: 132 MMHG | HEART RATE: 56 BPM | DIASTOLIC BLOOD PRESSURE: 80 MMHG | OXYGEN SATURATION: 96 %

## 2023-10-13 DIAGNOSIS — E78.2 MIXED HYPERLIPIDEMIA: ICD-10-CM

## 2023-10-13 DIAGNOSIS — M79.604 LOW BACK PAIN RADIATING TO RIGHT LEG: Primary | ICD-10-CM

## 2023-10-13 DIAGNOSIS — M54.50 LOW BACK PAIN RADIATING TO RIGHT LEG: ICD-10-CM

## 2023-10-13 DIAGNOSIS — F10.11 HISTORY OF ETOH ABUSE: ICD-10-CM

## 2023-10-13 DIAGNOSIS — M79.604 LOW BACK PAIN RADIATING TO RIGHT LEG: ICD-10-CM

## 2023-10-13 DIAGNOSIS — I10 ESSENTIAL HYPERTENSION: ICD-10-CM

## 2023-10-13 DIAGNOSIS — M54.50 LOW BACK PAIN RADIATING TO RIGHT LEG: Primary | ICD-10-CM

## 2023-10-13 PROCEDURE — 3074F SYST BP LT 130 MM HG: CPT | Performed by: NURSE PRACTITIONER

## 2023-10-13 PROCEDURE — 3078F DIAST BP <80 MM HG: CPT | Performed by: NURSE PRACTITIONER

## 2023-10-13 PROCEDURE — 99214 OFFICE O/P EST MOD 30 MIN: CPT | Performed by: NURSE PRACTITIONER

## 2023-10-13 RX ORDER — CLOBETASOL PROPIONATE 0.5 MG/G
OINTMENT TOPICAL
Status: CANCELLED | OUTPATIENT
Start: 2023-10-13

## 2023-10-13 RX ORDER — AMLODIPINE BESYLATE 5 MG/1
5 TABLET ORAL DAILY
Qty: 90 TABLET | Refills: 3 | Status: SHIPPED | OUTPATIENT
Start: 2023-10-13

## 2023-10-13 RX ORDER — CLOBETASOL PROPIONATE 0.5 MG/G
OINTMENT TOPICAL
COMMUNITY
Start: 2023-09-20

## 2023-10-13 RX ORDER — ATENOLOL 100 MG/1
100 TABLET ORAL DAILY
Qty: 90 TABLET | Refills: 3 | Status: SHIPPED | OUTPATIENT
Start: 2023-10-13

## 2023-10-13 RX ORDER — ROSUVASTATIN CALCIUM 20 MG/1
20 TABLET, COATED ORAL DAILY
Qty: 90 TABLET | Refills: 3 | Status: SHIPPED | OUTPATIENT
Start: 2023-10-13

## 2023-10-13 ASSESSMENT — ENCOUNTER SYMPTOMS
BACK PAIN: 1
SHORTNESS OF BREATH: 0
ABDOMINAL PAIN: 0
WHEEZING: 0
CHEST TIGHTNESS: 0

## 2023-10-16 ENCOUNTER — TELEPHONE (OUTPATIENT)
Dept: INTERNAL MEDICINE CLINIC | Facility: CLINIC | Age: 63
End: 2023-10-16

## 2023-10-16 NOTE — TELEPHONE ENCOUNTER
Called and informed patient of the X-ray results of the Lumbar Spine per Tammy Arauz NP. Patient verbalized understanding and wanted to know what needs to be done at this point regarding the degenerative disc disease.

## 2023-10-16 NOTE — TELEPHONE ENCOUNTER
Patient notified that the orthopedic and physical therapy orders have been placed in his chart. Patient verbalized understanding.

## 2023-10-16 NOTE — RESULT ENCOUNTER NOTE
Called and informed patient of the X-ray results of the Lumbar Spine per Yumiko Lazar NP. Patient verbalized understanding and wanted to know what needs to be done at this point regarding the degenerative disc disease.

## 2023-10-16 NOTE — TELEPHONE ENCOUNTER
----- Message from IVELISSE Hooper NP sent at 10/14/2023  1:32 PM EDT -----  Your x-ray shows multilevel degenerative disc disease and facet arthropathy which is most enhanced at L5-S1.

## 2023-10-20 ENCOUNTER — OFFICE VISIT (OUTPATIENT)
Dept: ORTHOPEDIC SURGERY | Age: 63
End: 2023-10-20
Payer: COMMERCIAL

## 2023-10-20 DIAGNOSIS — M54.16 LUMBAR RADICULOPATHY: ICD-10-CM

## 2023-10-20 DIAGNOSIS — M47.816 FACET ARTHROPATHY, LUMBAR: ICD-10-CM

## 2023-10-20 DIAGNOSIS — M48.062 LUMBAR STENOSIS WITH NEUROGENIC CLAUDICATION: Primary | ICD-10-CM

## 2023-10-20 DIAGNOSIS — M51.36 DDD (DEGENERATIVE DISC DISEASE), LUMBAR: ICD-10-CM

## 2023-10-20 PROCEDURE — 99204 OFFICE O/P NEW MOD 45 MIN: CPT | Performed by: PHYSICIAN ASSISTANT

## 2023-10-20 RX ORDER — METHYLPREDNISOLONE 4 MG/1
TABLET ORAL
Qty: 1 KIT | Refills: 0 | Status: SHIPPED | OUTPATIENT
Start: 2023-10-20

## 2023-10-20 NOTE — PROGRESS NOTES
Name: Uzair Hancock  YOB: 1960  Gender: male  MRN: [id number]    CC: Back Pain (Low back pain into right leg)       HPI: This is a 57y.o. year old male who has greater than 6-month history of pain in the lower back in the right posterior buttock and right posterior leg. Initially the back pain started after he was playing golf and was mostly in the back and it is now progressed to daily pain in the right posterior leg. It is worse with standing and walking but also driving especially in the evening after has been standing all day. He has tried [employer] without relief. He had a prior right total knee arthroplasty doing well. He denies numbness or tingling. He will begin physical therapy on Tuesday. Pain level can get up to 8 out of 10. History was obtained by patient    The patient denies any change in bowel or bladder function since the onset of the symptoms. he  has not had lumbar surgery in the past.           10/20/2023     9:30 AM   AMB PAIN ASSESSMENT   Location of Pain Back    Location Modifiers Right   Severity of Pain 7   Quality of Pain Aching; [place]; Throbbing   Duration of Pain Persistent   Frequency of Pain Constant   Date Pain First Started 4/10/2023   Aggravating Factors Other (Comment)    Limiting Behavior Some   Relieving Factors Other (Comment); Nsaids    Result of Injury No   Work-Related Injury No   Are there other pain locations you wish to document? No       Significant value            ROS/Meds/PSH/PMH/FH/SH: I personally reviewed the patient's collected intake data.   Below are the pertinents:    Allergies   Allergen Reactions    Ramelteon Other (See Comments)    Celecoxib Rash    Sulfa Antibiotics Rash    Zolpidem Anxiety         Current Outpatient Medications:     methylPREDNISolone (MEDROL DOSEPACK) 4 MG tablet, Take by mouth as directed., Disp: 1 kit, Rfl: 0    clobetasol (TEMOVATE) 0.05 % ointment, , Disp: , Rfl:     amLODIPine (NORVASC) 5 MG

## 2023-10-24 ENCOUNTER — HOSPITAL ENCOUNTER (OUTPATIENT)
Dept: PHYSICAL THERAPY | Age: 63
Setting detail: RECURRING SERIES
Discharge: HOME | End: 2023-10-27
Payer: COMMERCIAL

## 2023-10-24 DIAGNOSIS — M25.551 PAIN IN RIGHT HIP: ICD-10-CM

## 2023-10-24 DIAGNOSIS — M54.16 RADICULOPATHY, LUMBAR REGION: Primary | ICD-10-CM

## 2023-10-24 PROCEDURE — 97162 PT EVAL MOD COMPLEX 30 MIN: CPT

## 2023-10-24 PROCEDURE — 97110 THERAPEUTIC EXERCISES: CPT

## 2023-10-24 NOTE — PROGRESS NOTES
Tosha Montoya Saline  : 1960  [facility]: Alonso Cain Sc ([facility]  Secondary:  [address] @ [facility]  [address] [address]  Phone: [phone]  Fax: [phone] Plan Frequency: 2/week x 6 weeks    Plan of Care/Certification Expiration Date: 23      >PT Visit Info:  Plan Frequency: 2/week x 6 weeks  Plan of Care/Certification Expiration Date: 23  Total # of Visits to Date: 1      Visit Count:  1    OUTPATIENT PHYSICAL THERAPY: Treatment Note 10/24/2023       Episode  }Appt Desk             Treatment Diagnosis:    Radiculopathy, lumbar region  Pain in right hip  Medical/Referring Diagnosis:    Low back pain radiating to right leg  Referring Physician:  IVELISSE Motta NP, MD Orders:  PT Eval and Treat   Date of Onset:  Onset Date: 23     Allergies:   Ramelteon, Celecoxib, Sulfa antibiotics, and Zolpidem  Restrictions/Precautions:  No data recordedNo data recorded     Interventions Planned (Treatment may consist of any combination of the following):    Current Treatment Recommendations: ROM; Strengthening; Functional mobility training; Home exercise program; Manual     >Subjective Comments:   I was afraid to do movements - afraid I would make it worse. >Initial:     5 /10>Post Session:     -2   /10  Medications Last Reviewed:  10/24/2023  Updated Objective Findings:  See Evaluation Note from today  Treatment   THERAPEUTIC EXERCISE: (20 minutes):    Exercises per grid below to improve mobility. Required minimal visual and verbal cues to promote proper body alignment and promote proper body posture. Progressed range and repetitions as indicated. Date:  10/24/23 Date:   Date:     Activity/Exercise Parameters Parameters Parameters   Standing trunk ext 2 x 10     education Sitting , sleep postures,  Disc theroory                                   HEP- standing ext 10 x every 2-3 hours.     Treatment/Session Summary:    >Treatment

## 2023-10-24 NOTE — THERAPY EVALUATION
pain and work a full day without pain\"  Initial:     5 /10 Post Session:      /10   pain rates 2- 9/10 varies throughout day. Past Medical History/Comorbidities:   Mr. Angela Marc  has a past medical history of Alcohol dependence, binge pattern ([address]), Arthritis, Back pain, CAD (coronary artery disease), Chest pain, Cocaine abuse ([address]), Degenerative arthritis of knee, Frontal sinusitis, Hyperlipidemia, Knee effusion, Knee pain, Tobacco use, Unspecified essential hypertension, Verruga peruana, and Viral warts, unspecified. Mr. Angela Marc  has a past surgical history that includes Appendectomy (1982); heent; hernia repair (09/15/2021); orthopedic surgery; and Knee arthroscopy (Bilateral). Social History/Living Environment:   Lives With: Other (comment)       Prior Level of Function/Work/Activity:   Prior level of function: Independent  Occupation: Full time employment         Learning:   Does the patient/guardian have any barriers to learning?: No barriers  Will there be a co-learner?: No  What is the preferred language of the patient/guardian?: English  Is an  required?: No  How does the patient/guardian prefer to learn new concepts?: Listening; Reading; Demonstration; Pictures/Videos       Fall Risk Scale: Dawkins Total Score: 0  Dawkins Fall Risk: Low (0-24)             OBJECTIVE     Pt presents to clinic with low back pain and R leg pain. Pt in forward bent posture upon entering [facility]. ROM- Trunk flex- finger tips to knee with increase in leg pain   Ext- limited to neutral no pain   Lateral shift L- WNL  R WNL mild pain  Strength LE- R hip flex- 4/5 with pain   R knee flex- 4/5 pain down posterior hip and leg    R ant tib- 4/5  Sensory not numbness and tingling  Pt works as a  and has to work in forward bent position often. Repeated motions- Standing repeated lumbar ext-  abolished leg pain    ASSESSMENT   Initial Assessment:  Pt with lumbar derangement with pain down R LE to knee level.   He was

## 2023-10-27 ENCOUNTER — HOSPITAL ENCOUNTER (OUTPATIENT)
Dept: PHYSICAL THERAPY | Age: 63
Setting detail: RECURRING SERIES
Discharge: HOME | End: 2023-10-30
Payer: COMMERCIAL

## 2023-10-27 PROCEDURE — 97110 THERAPEUTIC EXERCISES: CPT

## 2023-10-27 NOTE — PROGRESS NOTES
Paradise Walker  : 1960  Primary: Rosalinda Carbajal ([facility])  Secondary:  Triston Durant @ [facility] [address] [address]  Phone: [phone]  Fax: [phone] Plan Frequency: 2/week x 6 weeks    Plan of Care/Certification Expiration Date: 23      >PT Visit Info:  Plan Frequency: 2/week x 6 weeks  Plan of Care/Certification Expiration Date: 23  Total # of Visits to Date: 1      Visit Count:  2    OUTPATIENT PHYSICAL THERAPY: Treatment Note 10/27/2023       Episode  }Appt Desk             Treatment Diagnosis:    No data found  Medical/Referring Diagnosis:      Referring Physician:  IVELISSE Garcia NP, MD Orders:  PT Eval and Treat   Date of Onset:  Onset Date: 23     Allergies:   Ramelteon, Celecoxib, Sulfa antibiotics, and Zolpidem  Restrictions/Precautions:  No data recordedNo data recorded     Interventions Planned (Treatment may consist of any combination of the following):    Current Treatment Recommendations: ROM; Strengthening; Functional mobility training; Home exercise program; Manual     >Subjective Comments:   Pt states his back pain is much better but still having the sciatic pain but that is less. He is also complaining of L knee pain. Pt states he saw an ortho for his knees and he has arthritis in his knees. >Initial:     1 /10>Post Session:       1 /10     L knee pain 8/10  Medications Last Reviewed:  10/27/2023  Updated Objective Findings:  None Today  Treatment   THERAPEUTIC EXERCISE: (20 minutes):    Exercises per grid below to improve mobility. Required minimal visual and verbal cues to promote proper body alignment and promote proper body posture. Progressed range and repetitions as indicated.    Date:  10/24/23 Date:   Date:     Activity/Exercise Parameters Parameters Parameters   Standing trunk ext 2 x 10     education Sitting , sleep postures,  Disc theroory Knee arthritis and strengthening    Prone press

## 2023-10-30 ENCOUNTER — HOSPITAL ENCOUNTER (OUTPATIENT)
Dept: PHYSICAL THERAPY | Age: 63
Setting detail: RECURRING SERIES
End: 2023-10-30
Payer: COMMERCIAL

## 2023-10-30 NOTE — PROGRESS NOTES
Eladia Garcias Saline  : 1960  Primary: Adolfo Sainz Sc  Secondary:  [address] @ [facility]  [address]  [address] [facility] [address]  Phone: [phone]  Fax: [phone]    PT Visit Info: Total # of Visits to Date: 2     OT Visit Info:  No data recorded    OUTPATIENT THERAPY: 10/30/2023  Episode  Appt Desk        Eladia Sullivan cancelled his appointment for today due to unknown reasons. Will plan to follow up next during next appointment.   Thank you,  Bianca Rucker, PT    Future Appointments   Date Time Provider [address]   10/30/2023  1:15 PM Bianca Rucker, PT [facility]   11/3/2023 12:30 PM Bianca Rucker, PT [facility]   2023  2:15 PM Bianca Rucker, PT [facility]   2023  2:15 PM Bianca Rucker, PT [facility]   2023  2:15 PM Katja Bauer, PT [facility]   2023 12:30 PM Bianca Rucker, PT [facility]   2023  2:15 PM Giancarlo Bauere, PT [facility]   2023 10:00 AM Paulette Louie PA-C [facility]   2023  2:15 PM Juancarlos, Katja, PT [facility]   2024  3:00 PM IVELISSE Howe - JAMES [facility]

## 2023-11-03 ENCOUNTER — HOSPITAL ENCOUNTER (OUTPATIENT)
Dept: PHYSICAL THERAPY | Age: 63
Setting detail: RECURRING SERIES
Discharge: HOME | End: 2023-11-06
Payer: MEDICARE

## 2023-11-03 PROCEDURE — 97110 THERAPEUTIC EXERCISES: CPT

## 2023-11-03 NOTE — PROGRESS NOTES
Jas Gann Denise  : 1960  Primary: Suad Tapia Sc ([place] [employer])  Secondary:  [address] @ [facility]  [address] [address]  Phone: [phone]  Fax: [phone] Plan Frequency: 2/week x 6 weeks    Plan of Care/Certification Expiration Date: 23      >PT Visit Info:  Plan Frequency: 2/week x 6 weeks  Plan of Care/Certification Expiration Date: 23  Total # of Visits to Date: 2      Visit Count:  3    OUTPATIENT PHYSICAL THERAPY: Treatment Note 11/3/2023       Episode  }Appt Desk             Treatment Diagnosis:    No data found  Medical/Referring Diagnosis:      Referring Physician:  IVELISSE Cordon NP, MD Orders:  PT Eval and Treat   Date of Onset:  Onset Date: 23     Allergies:   Ramelteon, Celecoxib, Sulfa antibiotics, and Zolpidem  Restrictions/Precautions:  No data recordedNo data recorded     Interventions Planned (Treatment may consist of any combination of the following):    Current Treatment Recommendations: ROM; Strengthening; Functional mobility training; Home exercise program; Manual     >Subjective Comments:   Pt states he has had a bad week - his sciatic nerve is bothering him a lot with driving.     >Initial:     7 /10>Post Session:       1 /10    from low back to knee level. Medications Last Reviewed:  11/3/2023  Updated Objective Findings:   weak L hip abductors  Treatment   THERAPEUTIC EXERCISE: (40 minutes):    Exercises per grid below to improve mobility. Required minimal visual and verbal cues to promote proper body alignment and promote proper body posture. Progressed range and repetitions as indicated.    Date:  10/24/23 Date:   Date:     Activity/Exercise Parameters Parameters Parameters   Standing trunk ext 2 x 10     education Sitting , sleep postures,  Disc theroory Knee arthritis and strengthening Adjusted his car seat and added lumbar roll   Prone press up  10x 10x  Prone prop using table 5 min   Quad set

## 2023-11-06 ENCOUNTER — HOSPITAL ENCOUNTER (OUTPATIENT)
Dept: PHYSICAL THERAPY | Age: 63
Setting detail: RECURRING SERIES
End: 2023-11-06
Payer: MEDICARE

## 2023-11-06 NOTE — PROGRESS NOTES
Oscar Thomas Saline  : 1960  Primary: Katy  Sc  Secondary:  [address] @ [facility]  [address]  [address] [facility] [address]  Phone: [phone]  Fax: [phone]    PT Visit Info: Total # of Visits to Date: 3     OT Visit Info:  No data recorded    OUTPATIENT THERAPY: 2023  Episode  Appt Desk        Oscar Sullivan cancelled his appointment for today due to work related issues. Will plan to follow up next during next appointment.   Thank you,  Edvin Franco, PT    Future Appointments   Date Time Provider [address]   2023  2:15 PM Edvin Franco, PT [facility]   2023  2:15 PM Edvin Franco, PT [facility]   2023 12:30 PM Edvin Franco, PT [facility]   2023  2:15 PM Katja Bauer, PT [facility]   2023 10:00 AM Paulette Louie PA-C [facility]   2023  2:15 PM Edvin Franco, PT [facility]   2024  3:00 PM IVELISSE Katz NP [facility]

## 2023-11-09 ENCOUNTER — HOSPITAL ENCOUNTER (OUTPATIENT)
Dept: PHYSICAL THERAPY | Age: 63
Setting detail: RECURRING SERIES
Discharge: HOME | End: 2023-11-12
Payer: MEDICARE

## 2023-11-09 PROCEDURE — 97110 THERAPEUTIC EXERCISES: CPT

## 2023-11-13 ENCOUNTER — HOSPITAL ENCOUNTER (OUTPATIENT)
Dept: PHYSICAL THERAPY | Age: 63
Setting detail: RECURRING SERIES
Discharge: HOME | End: 2023-11-16
Payer: MEDICARE

## 2023-11-13 PROCEDURE — 97110 THERAPEUTIC EXERCISES: CPT

## 2023-11-13 NOTE — PROGRESS NOTES
Jing Valadez Saline  : 1960  Primary: Medicare Part A And B ([place] [employer])  Secondary:  [address] @ [facility]  [address] [address]  Phone: [phone]  Fax: [phone] Plan Frequency: 2/week x 6 weeks    Plan of Care/Certification Expiration Date: 23      >PT Visit Info:  Plan Frequency: 2/week x 6 weeks  Plan of Care/Certification Expiration Date: 23  Total # of Visits to Date: 4      Visit Count:  4    OUTPATIENT PHYSICAL THERAPY: Treatment Note 2023       Episode  }Appt Desk             Treatment Diagnosis:    No data found  Medical/Referring Diagnosis:      Referring Physician:  IVELISSE Farley NP, MD Orders:  PT Eval and Treat   Date of Onset:  Onset Date: 23     Allergies:   Ramelteon, Celecoxib, Sulfa antibiotics, and Zolpidem  Restrictions/Precautions:  No data recordedNo data recorded     Interventions Planned (Treatment may consist of any combination of the following):    Current Treatment Recommendations: ROM; Strengthening; Functional mobility training; Home exercise program; Manual     >Subjective Comments:   Pt states he had been doing pretty well until this afternoon when his sciatic pain started. He was driving      >Initial:     7 /10>Post Session:       2 /10    Medications Last Reviewed:  2023  Updated Objective Findings:   weak L hip abductors  Treatment   THERAPEUTIC EXERCISE: (45 minutes):  Date    Exercises per grid below to improve mobility. Required minimal visual and verbal cues to promote proper body alignment and promote proper body posture. Progressed range and repetitions as indicated.    Date:  10/24/23 Date:   Date:   Date  23   Activity/Exercise Parameters Parameters Parameters    Standing trunk ext 2 x 10             education Sitting , sleep postures,  Disc theroory Knee arthritis and strengthening Adjusted his car seat and added lumbar roll    Prone press up  10x 10x  Prone

## 2023-11-13 NOTE — PROGRESS NOTES
Jing Valadez Saline  : 1960  Primary: Medicare Part A And B ([place] [facility])  Secondary:  [address] @ [facility]  [address] [place] [address] [place] [address]  Phone: [phone]  Fax: [phone] Plan Frequency: 2/week x 6 weeks    Plan of Care/Certification Expiration Date: 23      >PT Visit Info:  Plan Frequency: 2/week x 6 weeks  Plan of Care/Certification Expiration Date: 23  Total # of Visits to Date: 4      Visit Count:  5    OUTPATIENT PHYSICAL THERAPY: Treatment Note 2023       Episode  }Appt Desk             Treatment Diagnosis:    No data found  Medical/Referring Diagnosis:      Referring Physician:  IVELISSE Farley NP, MD Orders:  PT Eval and Treat   Date of Onset:  Onset Date: 23     Allergies:   Ramelteon, Celecoxib, Sulfa antibiotics, and Zolpidem  Restrictions/Precautions:  No data recordedNo data recorded     Interventions Planned (Treatment may consist of any combination of the following):    Current Treatment Recommendations: ROM; Strengthening; Functional mobility training; Home exercise program; Manual     >Subjective Comments:   Pt states he is having the worst several days that he has had since this began. He states his sciatic pain is all the way to the ankle today. He states it comes on routinely every morning when he gets into the shower in the morning. >Initial:     7 /10>Post Session:       2 /10    Medications Last Reviewed:  2023  Updated Objective Findings:   weak L hip abductors  Treatment   THERAPEUTIC EXERCISE: (45 minutes):  Date   Laying prone with pillow under abdomen for about 5 minutes- pain down leg abolished but back pain 4/10. Laying prone without pillow 5 min  tried standing extensions but was unable to do them as leg pain returned  Laying prone with table propped at about 40 degrees- no leg pain back pain 3/10 after 3-4 min      Exercises per grid below to improve mobility.   Required minimal

## 2023-11-17 ENCOUNTER — HOSPITAL ENCOUNTER (OUTPATIENT)
Dept: PHYSICAL THERAPY | Age: 63
Setting detail: RECURRING SERIES
Discharge: HOME | End: 2023-11-20
Payer: MEDICARE

## 2023-11-17 PROCEDURE — 97110 THERAPEUTIC EXERCISES: CPT

## 2023-11-17 NOTE — PROGRESS NOTES
Esha Good Saline  : 1960  Primary: Medicare Part A And B ([place] [employer])  Secondary:  [address] @ [facility]  [address] [address]  Phone: [phone]  Fax: [phone] Plan Frequency: 2/week x 6 weeks    Plan of Care/Certification Expiration Date: 23      >PT Visit Info:  Plan Frequency: 2/week x 6 weeks  Plan of Care/Certification Expiration Date: 23  Total # of Visits to Date: 5      Visit Count:  6    OUTPATIENT PHYSICAL THERAPY: Treatment Note 2023       Episode  }Appt Desk             Treatment Diagnosis:    No data found  Medical/Referring Diagnosis:      Referring Physician:  IVELISSE Medina NP, MD Orders:  PT Eval and Treat   Date of Onset:  Onset Date: 23     Allergies:   Ramelteon, Celecoxib, Sulfa antibiotics, and Zolpidem  Restrictions/Precautions:  No data recordedNo data recorded     Interventions Planned (Treatment may consist of any combination of the following):    Current Treatment Recommendations: ROM; Strengthening; Functional mobility training; Home exercise program; Manual     >Subjective Comments:   States he is doing pretty well today as he has not been doing anything- mostly watching tv     >Initial:     0 /10>Post Session:       3 /10  back 0/10 sciatic pain  Medications Last Reviewed:  2023  Updated Objective Findings:  None Today  Treatment   THERAPEUTIC EXERCISE: (45 minutes):  Date    Exercises per grid below to improve mobility. Required minimal visual and verbal cues to promote proper body alignment and promote proper body posture. Progressed range and repetitions as indicated.    Date:  10/24/23 Date:   Date:   Date  23   Activity/Exercise Parameters Parameters Parameters     Standing trunk ext 2 x 10    1 x 5   Lying prone     After sciatic pain started   Lay prone over pillow for several minutes then without pillow then propped on elbows followed by press ups  Did 2x

## 2023-11-20 ENCOUNTER — HOSPITAL ENCOUNTER (OUTPATIENT)
Dept: PHYSICAL THERAPY | Age: 63
Setting detail: RECURRING SERIES
Discharge: HOME | End: 2023-11-23
Payer: MEDICARE

## 2023-11-20 PROCEDURE — 97110 THERAPEUTIC EXERCISES: CPT

## 2023-11-20 NOTE — PROGRESS NOTES
AM Paulette Louie PA-C [facility]   11/22/2023  2:15 PM Bianca Rucker, PT [facility]   4/19/2024  3:00 PM IVELISSE Howe - JAMES [facility]

## 2023-11-21 ENCOUNTER — OFFICE VISIT (OUTPATIENT)
Dept: ORTHOPEDIC SURGERY | Age: 63
End: 2023-11-21
Payer: MEDICARE

## 2023-11-21 DIAGNOSIS — M51.36 DDD (DEGENERATIVE DISC DISEASE), LUMBAR: ICD-10-CM

## 2023-11-21 DIAGNOSIS — M54.16 LUMBAR RADICULOPATHY: Primary | ICD-10-CM

## 2023-11-21 PROCEDURE — 99213 OFFICE O/P EST LOW 20 MIN: CPT | Performed by: PHYSICIAN ASSISTANT

## 2023-11-21 PROCEDURE — 1036F TOBACCO NON-USER: CPT | Performed by: PHYSICIAN ASSISTANT

## 2023-11-21 PROCEDURE — G8428 CUR MEDS NOT DOCUMENT: HCPCS | Performed by: PHYSICIAN ASSISTANT

## 2023-11-21 PROCEDURE — G8484 FLU IMMUNIZE NO ADMIN: HCPCS | Performed by: PHYSICIAN ASSISTANT

## 2023-11-21 PROCEDURE — 3017F COLORECTAL CA SCREEN DOC REV: CPT | Performed by: PHYSICIAN ASSISTANT

## 2023-11-21 PROCEDURE — G8417 CALC BMI ABV UP PARAM F/U: HCPCS | Performed by: PHYSICIAN ASSISTANT
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
## 2023-11-22 ENCOUNTER — APPOINTMENT (OUTPATIENT)
Dept: PHYSICAL THERAPY | Age: 63
End: 2023-11-22
Payer: MEDICARE

## 2023-12-08 ENCOUNTER — OFFICE VISIT (OUTPATIENT)
Dept: ORTHOPEDIC SURGERY | Age: 63
End: 2023-12-08
Payer: MEDICARE

## 2023-12-08 DIAGNOSIS — M47.816 FACET ARTHROPATHY, LUMBAR: ICD-10-CM

## 2023-12-08 DIAGNOSIS — M51.36 DDD (DEGENERATIVE DISC DISEASE), LUMBAR: Primary | ICD-10-CM

## 2023-12-08 DIAGNOSIS — M54.16 LUMBAR RADICULOPATHY: ICD-10-CM

## 2023-12-08 DIAGNOSIS — M48.061 FORAMINAL STENOSIS OF LUMBAR REGION: ICD-10-CM

## 2023-12-08 PROCEDURE — G8417 CALC BMI ABV UP PARAM F/U: HCPCS | Performed by: PHYSICIAN ASSISTANT

## 2023-12-08 PROCEDURE — 1123F ACP DISCUSS/DSCN MKR DOCD: CPT | Performed by: PHYSICIAN ASSISTANT

## 2023-12-08 PROCEDURE — G8484 FLU IMMUNIZE NO ADMIN: HCPCS | Performed by: PHYSICIAN ASSISTANT

## 2023-12-08 PROCEDURE — 99214 OFFICE O/P EST MOD 30 MIN: CPT | Performed by: PHYSICIAN ASSISTANT

## 2023-12-08 PROCEDURE — 3017F COLORECTAL CA SCREEN DOC REV: CPT | Performed by: PHYSICIAN ASSISTANT

## 2023-12-08 PROCEDURE — G8428 CUR MEDS NOT DOCUMENT: HCPCS | Performed by: PHYSICIAN ASSISTANT

## 2023-12-08 PROCEDURE — 1036F TOBACCO NON-USER: CPT | Performed by: PHYSICIAN ASSISTANT

## 2023-12-08 NOTE — PATIENT INSTRUCTIONS
Epidural Steroid Injection / Selective Nerve Root Block  What is it? An epidural steroid injection (OUSMANE) is an injection of an anti-inflammatory medication directly on the sac that covers the spinal cord and nerves. A Selective Nerve Root Block (SNRB) is an injection that is directed around a specific nerve. Your physician usually orders an injection  when you have symptoms of an irritated spinal nerve. These symptoms can include back, buttock or leg pain, weakness, or numbness. Irritated spinal nerves are often caused by disc herniations or a tight spinal canal (stenosis). The goal of the steroid injection is to reduce the inflammation around the spinal cord and nerves, which should reduce the amount of back and leg pain you are experiencing. Epidural injections are often done in series. It would not be unusual to have two or three injections in a row 10 to 14 days apart. The reason for multiple injections is that the relief from the injection may wear off over time. And sometimes it takes multiple doses of steroid injection to obtain the most anti-inflammatory effect around the nerves. How is it performed? You will be asked to lie on your side or stomach on the x-ray table. This will allow the physician to position you in the best way possible to access your back. Next, the skin will be cleaned and numbed with a local anesthetic. An X-ray machine will then be used to guide a small gauge needle into the space over your spinal sac. A small amount of dye will then be injected to insure the needle is in the proper position. Finally, a mixture of numbing medicine and anti-inflammatory (steroid/cortisone) will be injected. How long does it take? All together it should take about 1 hour. The back and legs may feel weak or numb for a couple of hours after the injection. Plan the have someone drive you home. Are there any restrictions after the OUSMANE?    Try to spend the remainder of

## 2023-12-08 NOTE — PROGRESS NOTES
Name: Eladia Garcias [place]  YOB: 1960  Gender: male  MRN: [id number]    CC: Back Pain (MRI results)       HPI: This is a 70y.o. year old male who has greater than 6-month history of pain in the lower back in the right posterior buttock and right posterior leg. Initially the back pain started after he was playing golf and was mostly in the back and it is now progressed to daily pain in the right posterior leg. It is worse with standing and walking but also driving especially in the evening after has been standing all day. He has tried Aleve, Salonpas, oral steroids without relief. He had degenerative changes of the lumbar spine L4-5 L5-S1. Prior CT scan of the abdomen pelvis did reveal some stenosis L4-5 and 5 1. We did refer him to physical therapy he has participated in physical therapy for 1 month. We also prescribed oral steroids. Physical therapy is helping his lower back pain but the radicular leg pain posterior leg on the right has exacerbated. Even after doing his exercises if he sits for period of time or even stands for period of time he has right posterior leg pain. He is also having some weakness of the right lower extremity now. He cannot go up on his right toes and do a heel raise. We ordered MRI scan of the lumbar spine. His pain is still at least 7 out of 10 he is certainly interested in pursuing some injections to help alleviate the symptoms. 10/20/2023     9:30 AM   AMB PAIN ASSESSMENT   Location of Pain Back    Location Modifiers Right   Severity of Pain 7   Quality of Pain Aching; Solomon Marlene; Throbbing   Duration of Pain Persistent   Frequency of Pain Constant   Date Pain First Started 4/10/2023   Aggravating Factors Other (Comment)    Limiting Behavior Some   Relieving Factors Other (Comment); Nsaids    Result of Injury No   Work-Related Injury No   Are there other pain locations you wish to document?  No       Significant value            ROS/Meds/PSH/PMH/FH/SH:

## 2023-12-11 ENCOUNTER — OFFICE VISIT (OUTPATIENT)
Dept: ORTHOPEDIC SURGERY | Age: 63
End: 2023-12-11
Payer: MEDICARE

## 2023-12-11 VITALS — WEIGHT: 257 LBS | BODY MASS INDEX: 32.98 KG/M2 | HEIGHT: 74 IN

## 2023-12-11 DIAGNOSIS — M54.16 LUMBAR RADICULOPATHY: Primary | ICD-10-CM

## 2023-12-11 PROCEDURE — 64483 NJX AA&/STRD TFRM EPI L/S 1: CPT | Performed by: PHYSICAL MEDICINE & REHABILITATION

## 2023-12-11 PROCEDURE — 64484 NJX AA&/STRD TFRM EPI L/S EA: CPT | Performed by: PHYSICAL MEDICINE & REHABILITATION

## 2023-12-11 RX ORDER — TRIAMCINOLONE ACETONIDE 40 MG/ML
160 INJECTION, SUSPENSION INTRA-ARTICULAR; INTRAMUSCULAR ONCE
Status: COMPLETED | OUTPATIENT
Start: 2023-12-11 | End: 2023-12-11

## 2023-12-11 RX ADMIN — TRIAMCINOLONE ACETONIDE 160 MG: 40 INJECTION, SUSPENSION INTRA-ARTICULAR; INTRAMUSCULAR at 16:20

## 2023-12-11 NOTE — PROGRESS NOTES
Date: 12/11/23   Name: Ashleigh Hancock    Pre-Procedural Diagnosis:    Diagnosis Orders   1. Lumbar radiculopathy  FL NERVE BLOCK LUMBOSACRAL 1ST    FL NERVE BLOCK LUMBOSACRAL EACH ADD    triamcinolone acetonide (KENALOG-40) injection 160 mg          Procedure: Selective Nerve Root Blocks (Transforaminal) - Multiple Level    Precautions: [facility] Precautions spine injections: None. Patient denies any prior sensitivity to steroid, local anesthetic, contrast dye, iodine or shellfish. The procedure was discussed at length with the patient and informed consent was signed. The patient was placed in a prone position on the fluoroscopy table and the skin was prepped and draped in a routine sterile fashion. The areas to be injected were each anesthetized with approximately 5 cc of 1% Lidocaine. A 22-gauge 5 inch spinal needle was carefully advanced under fluoroscopic guidance to the right L4 transforaminal space and subsequently the right L5 transforaminal space. At this time 0.25 cc of omnipaque administered. . Once proper placement was confirmed, 2 cc of 0.25% Marcaine and 80 mg of Kenalog were injected through the spinal needle at each site. Fluoroscopic guidance was used intermittently over a 10-minute period to insure proper needle placement and patient safety. A hard copy of the fluoroscopic  images has been placed in the patient's chart. The patient was monitored after the procedure and discharged home in stable fashion. A total of 4 cc of Kenalog were administered during this procedure.     Resume Meds:   Pt remains on asa 81 mg.    Amber Thompson MD  12/11/23

## 2023-12-14 ENCOUNTER — OFFICE VISIT (OUTPATIENT)
Dept: ORTHOPEDIC SURGERY | Age: 63
End: 2023-12-14

## 2023-12-14 DIAGNOSIS — M25.562 PAIN IN BOTH KNEES, UNSPECIFIED CHRONICITY: ICD-10-CM

## 2023-12-14 DIAGNOSIS — M25.561 RIGHT KNEE PAIN, UNSPECIFIED CHRONICITY: Primary | ICD-10-CM

## 2023-12-14 DIAGNOSIS — M25.561 PAIN IN BOTH KNEES, UNSPECIFIED CHRONICITY: ICD-10-CM

## 2023-12-14 DIAGNOSIS — M17.12 PRIMARY OSTEOARTHRITIS OF LEFT KNEE: ICD-10-CM

## 2023-12-14 RX ORDER — METHYLPREDNISOLONE ACETATE 40 MG/ML
40 INJECTION, SUSPENSION INTRA-ARTICULAR; INTRALESIONAL; INTRAMUSCULAR; SOFT TISSUE ONCE
Status: COMPLETED | OUTPATIENT
Start: 2023-12-14 | End: 2023-12-14

## 2023-12-14 RX ADMIN — METHYLPREDNISOLONE ACETATE 40 MG: 40 INJECTION, SUSPENSION INTRA-ARTICULAR; INTRALESIONAL; INTRAMUSCULAR; SOFT TISSUE at 15:51

## 2024-01-08 ENCOUNTER — OFFICE VISIT (OUTPATIENT)
Dept: ORTHOPEDIC SURGERY | Age: 64
End: 2024-01-08
Payer: MEDICARE

## 2024-01-08 DIAGNOSIS — M47.816 FACET ARTHROPATHY, LUMBAR: ICD-10-CM

## 2024-01-08 DIAGNOSIS — M48.061 FORAMINAL STENOSIS OF LUMBAR REGION: ICD-10-CM

## 2024-01-08 DIAGNOSIS — M51.36 DDD (DEGENERATIVE DISC DISEASE), LUMBAR: Primary | ICD-10-CM

## 2024-01-08 DIAGNOSIS — M48.062 LUMBAR STENOSIS WITH NEUROGENIC CLAUDICATION: ICD-10-CM

## 2024-01-08 PROCEDURE — 1036F TOBACCO NON-USER: CPT | Performed by: PHYSICIAN ASSISTANT

## 2024-01-08 PROCEDURE — 99213 OFFICE O/P EST LOW 20 MIN: CPT | Performed by: PHYSICIAN ASSISTANT

## 2024-01-08 PROCEDURE — G8484 FLU IMMUNIZE NO ADMIN: HCPCS | Performed by: PHYSICIAN ASSISTANT

## 2024-01-08 PROCEDURE — G8428 CUR MEDS NOT DOCUMENT: HCPCS | Performed by: PHYSICIAN ASSISTANT

## 2024-01-08 PROCEDURE — 3017F COLORECTAL CA SCREEN DOC REV: CPT | Performed by: PHYSICIAN ASSISTANT

## 2024-01-08 PROCEDURE — G8417 CALC BMI ABV UP PARAM F/U: HCPCS | Performed by: PHYSICIAN ASSISTANT

## 2024-01-08 PROCEDURE — 1123F ACP DISCUSS/DSCN MKR DOCD: CPT | Performed by: PHYSICIAN ASSISTANT

## 2024-01-08 NOTE — PROGRESS NOTES
Name: Seb Crocker [facility]  YOB: 1960  Gender: male  MRN: [id number]    CC: Back Pain (Follow up after injection with [facility] 12/11/23)       HPI: This is a 63 y.o. year old male who has greater than 6-month history of pain in the lower back in the right posterior buttock and right posterior leg.  Initially the back pain started after he was playing golf and was mostly in the back and it is now progressed to daily pain in the right posterior leg.  It is worse with standing and walking but also driving especially in the evening after has been standing all day.  He has tried Aleve, Salonpas, oral steroids without relief.    He had degenerative changes of the lumbar spine L4-5 L5-S1.  Prior CT scan of the abdomen pelvis did reveal some stenosis L4-5 and 5 1.  We did refer him to physical therapy he has participated in physical therapy for 1 month.  We also prescribed oral steroids.  Physical therapy is helping his lower back pain but the radicular leg pain posterior leg on the right has exacerbated.  Even after doing his exercises if he sits for period of time or even stands for period of time he has right posterior leg pain.  He is also having some weakness of the right lower extremity now.  He cannot go up on his right toes and do a heel raise.  We ordered MRI scan of the lumbar spine.  There is multilevel facet arthropathy.  There is bulky facet arthropathy L4-5 with lateral recess stenosis more prominent on the right;  there is severe right foraminal narrowing L4-5 moderate severe left foraminal narrowing L4-5.  L5-S1 no significant central stenosis but there is bilateral lateral recess stenosis and moderate severe right foraminal stenosis and severe left foraminal stenosis.    12/11/23  He had right L4 and 5 selective nerve root block with Dr. Garcia and reports 90% pain relief.  He still gets a twinge of symptoms but overall he is doing much better.  He is stretching and doing home exercises.  He

## 2024-02-02 ENCOUNTER — OFFICE VISIT (OUTPATIENT)
Dept: ORTHOPEDIC SURGERY | Age: 64
End: 2024-02-02

## 2024-02-02 ENCOUNTER — TELEPHONE (OUTPATIENT)
Dept: ORTHOPEDIC SURGERY | Age: 64
End: 2024-02-02

## 2024-02-02 VITALS — WEIGHT: 257 LBS | BODY MASS INDEX: 32.98 KG/M2 | HEIGHT: 74 IN

## 2024-02-02 DIAGNOSIS — M54.16 LUMBAR RADICULOPATHY: Primary | ICD-10-CM

## 2024-02-02 RX ORDER — TRIAMCINOLONE ACETONIDE 40 MG/ML
160 INJECTION, SUSPENSION INTRA-ARTICULAR; INTRAMUSCULAR ONCE
Status: COMPLETED | OUTPATIENT
Start: 2024-02-02 | End: 2024-02-02

## 2024-02-02 RX ADMIN — TRIAMCINOLONE ACETONIDE 160 MG: 40 INJECTION, SUSPENSION INTRA-ARTICULAR; INTRAMUSCULAR at 08:30

## 2024-02-02 NOTE — PROGRESS NOTES
Date: 02/02/24   Name: Seb Sullivan    Pre-Procedural Diagnosis:    Diagnosis Orders   1. Lumbar radiculopathy  triamcinolone acetonide (KENALOG-40) injection 160 mg    FL NERVE BLOCK LUMBOSACRAL 1ST    FL NERVE BLOCK LUMBOSACRAL EACH ADD          Procedure: Selective Nerve Root Blocks (Transforaminal) - Multiple Level    Precautions: [facility] Precautions spine injections: None.  Patient denies any prior sensitivity to steroid, local anesthetic, contrast dye, iodine or shellfish.    The procedure was discussed at length with the patient and informed consent was signed. The patient was placed in a prone position on the fluoroscopy table and the skin was prepped and draped in a routine sterile fashion. The areas to be injected were each anesthetized with approximately 5 cc of 1% Lidocaine. A 22-gauge 3.5 inch spinal needle was carefully advanced under fluoroscopic guidance to the right L4 transforaminal space and subsequently the right L5 transforaminal space. At this time 0.25 cc of omnipaque administered.. Once proper placement was confirmed, 2 cc of 0.25% Marcaine and 80 mg of Kenalog were injected through the spinal needle at each site.     Fluoroscopic guidance was used intermittently over a 10-minute period to insure proper needle placement and patient safety. A hard copy of the fluoroscopic  images has been placed in the patient's chart. The patient was monitored after the procedure and discharged home in stable fashion.     A total of 4 cc of Kenalog were administered during this procedure.    Resume Meds:   N/A    L YAW RUVACLABA JR, MD  02/02/24

## 2024-02-02 NOTE — TELEPHONE ENCOUNTER
----- Message from Jenn Jenkins MA sent at 2/2/2024  8:52 AM EST -----  Regarding: follow up appt  Patient was seen today for injection with Dr Garcia . He wants to know when he needs to follow up with Paulette

## 2024-04-12 DIAGNOSIS — E78.2 MIXED HYPERLIPIDEMIA: ICD-10-CM

## 2024-04-12 DIAGNOSIS — I10 ESSENTIAL HYPERTENSION: ICD-10-CM

## 2024-04-12 LAB
ALBUMIN SERPL-MCNC: 4.3 G/DL (ref 3.2–4.6)
ALBUMIN/GLOB SERPL: 1.7 (ref 0.4–1.6)
ALP SERPL-CCNC: 53 U/L (ref 50–136)
ALT SERPL-CCNC: 27 U/L (ref 12–65)
ANION GAP SERPL CALC-SCNC: 3 MMOL/L (ref 2–11)
AST SERPL-CCNC: 8 U/L (ref 15–37)
BASOPHILS # BLD: 0.1 K/UL (ref 0–0.2)
BASOPHILS NFR BLD: 1 % (ref 0–2)
BILIRUB SERPL-MCNC: 0.5 MG/DL (ref 0.2–1.1)
BUN SERPL-MCNC: 11 MG/DL (ref 8–23)
CALCIUM SERPL-MCNC: 9 MG/DL (ref 8.3–10.4)
CHLORIDE SERPL-SCNC: 113 MMOL/L (ref 103–113)
CHOLEST SERPL-MCNC: 120 MG/DL
CO2 SERPL-SCNC: 26 MMOL/L (ref 21–32)
CREAT SERPL-MCNC: 0.8 MG/DL (ref 0.8–1.5)
DIFFERENTIAL METHOD BLD: NORMAL
EOSINOPHIL # BLD: 0.2 K/UL (ref 0–0.8)
EOSINOPHIL NFR BLD: 3 % (ref 0.5–7.8)
ERYTHROCYTE [DISTWIDTH] IN BLOOD BY AUTOMATED COUNT: 13.6 % (ref 11.9–14.6)
GLOBULIN SER CALC-MCNC: 2.5 G/DL (ref 2.8–4.5)
GLUCOSE SERPL-MCNC: 110 MG/DL (ref 65–100)
HCT VFR BLD AUTO: 44.9 % (ref 41.1–50.3)
HDLC SERPL-MCNC: 45 MG/DL (ref 40–60)
HDLC SERPL: 2.7
HGB BLD-MCNC: 15 G/DL (ref 13.6–17.2)
IMM GRANULOCYTES # BLD AUTO: 0 K/UL (ref 0–0.5)
IMM GRANULOCYTES NFR BLD AUTO: 1 % (ref 0–5)
LDLC SERPL CALC-MCNC: 61.4 MG/DL
LYMPHOCYTES # BLD: 1.6 K/UL (ref 0.5–4.6)
LYMPHOCYTES NFR BLD: 26 % (ref 13–44)
MCH RBC QN AUTO: 32.6 PG (ref 26.1–32.9)
MCHC RBC AUTO-ENTMCNC: 33.4 G/DL (ref 31.4–35)
MCV RBC AUTO: 97.6 FL (ref 82–102)
MONOCYTES # BLD: 0.7 K/UL (ref 0.1–1.3)
MONOCYTES NFR BLD: 11 % (ref 4–12)
NEUTS SEG # BLD: 3.8 K/UL (ref 1.7–8.2)
NEUTS SEG NFR BLD: 58 % (ref 43–78)
NRBC # BLD: 0 K/UL (ref 0–0.2)
PLATELET # BLD AUTO: 202 K/UL (ref 150–450)
PMV BLD AUTO: 10.9 FL (ref 9.4–12.3)
POTASSIUM SERPL-SCNC: 4.2 MMOL/L (ref 3.5–5.1)
PROT SERPL-MCNC: 6.8 G/DL (ref 6.3–8.2)
RBC # BLD AUTO: 4.6 M/UL (ref 4.23–5.6)
SODIUM SERPL-SCNC: 142 MMOL/L (ref 136–146)
TRIGL SERPL-MCNC: 68 MG/DL (ref 35–150)
VLDLC SERPL CALC-MCNC: 13.6 MG/DL (ref 6–23)
WBC # BLD AUTO: 6.4 K/UL (ref 4.3–11.1)

## 2024-04-19 ENCOUNTER — OFFICE VISIT (OUTPATIENT)
Dept: INTERNAL MEDICINE CLINIC | Facility: CLINIC | Age: 64
End: 2024-04-19
Payer: MEDICARE

## 2024-04-19 VITALS
DIASTOLIC BLOOD PRESSURE: 94 MMHG | SYSTOLIC BLOOD PRESSURE: 132 MMHG | BODY MASS INDEX: 33.75 KG/M2 | HEART RATE: 63 BPM | HEIGHT: 74 IN | WEIGHT: 263 LBS | OXYGEN SATURATION: 95 %

## 2024-04-19 DIAGNOSIS — Z12.5 ENCOUNTER FOR SCREENING FOR MALIGNANT NEOPLASM OF PROSTATE: ICD-10-CM

## 2024-04-19 DIAGNOSIS — R29.898 HAND WEAKNESS: ICD-10-CM

## 2024-04-19 DIAGNOSIS — I10 ESSENTIAL HYPERTENSION: Primary | ICD-10-CM

## 2024-04-19 DIAGNOSIS — R73.01 FASTING HYPERGLYCEMIA: ICD-10-CM

## 2024-04-19 DIAGNOSIS — E78.2 MIXED HYPERLIPIDEMIA: ICD-10-CM

## 2024-04-19 PROBLEM — I77.810 ASCENDING AORTA DILATION (HCC): Status: ACTIVE | Noted: 2024-04-19

## 2024-04-19 PROCEDURE — 3080F DIAST BP >= 90 MM HG: CPT | Performed by: NURSE PRACTITIONER

## 2024-04-19 PROCEDURE — 3017F COLORECTAL CA SCREEN DOC REV: CPT | Performed by: NURSE PRACTITIONER

## 2024-04-19 PROCEDURE — 99214 OFFICE O/P EST MOD 30 MIN: CPT | Performed by: NURSE PRACTITIONER

## 2024-04-19 PROCEDURE — 1123F ACP DISCUSS/DSCN MKR DOCD: CPT | Performed by: NURSE PRACTITIONER

## 2024-04-19 PROCEDURE — G8427 DOCREV CUR MEDS BY ELIG CLIN: HCPCS | Performed by: NURSE PRACTITIONER

## 2024-04-19 PROCEDURE — 1036F TOBACCO NON-USER: CPT | Performed by: NURSE PRACTITIONER

## 2024-04-19 PROCEDURE — G8417 CALC BMI ABV UP PARAM F/U: HCPCS | Performed by: NURSE PRACTITIONER

## 2024-04-19 PROCEDURE — 3075F SYST BP GE 130 - 139MM HG: CPT | Performed by: NURSE PRACTITIONER

## 2024-04-19 RX ORDER — NAPROXEN SODIUM 220 MG
220 TABLET ORAL DAILY
COMMUNITY

## 2024-04-19 RX ORDER — ROSUVASTATIN CALCIUM 20 MG/1
20 TABLET, COATED ORAL DAILY
Qty: 100 TABLET | Refills: 3 | Status: SHIPPED | OUTPATIENT
Start: 2024-04-19

## 2024-04-19 RX ORDER — ATENOLOL 100 MG/1
100 TABLET ORAL DAILY
Qty: 100 TABLET | Refills: 3 | Status: SHIPPED | OUTPATIENT
Start: 2024-04-19

## 2024-04-19 RX ORDER — AMLODIPINE BESYLATE 5 MG/1
5 TABLET ORAL DAILY
Qty: 100 TABLET | Refills: 3 | Status: SHIPPED | OUTPATIENT
Start: 2024-04-19

## 2024-04-19 SDOH — ECONOMIC STABILITY: FOOD INSECURITY: WITHIN THE PAST 12 MONTHS, THE FOOD YOU BOUGHT JUST DIDN'T LAST AND YOU DIDN'T HAVE MONEY TO GET MORE.: NEVER TRUE

## 2024-04-19 SDOH — ECONOMIC STABILITY: INCOME INSECURITY: HOW HARD IS IT FOR YOU TO PAY FOR THE VERY BASICS LIKE FOOD, HOUSING, MEDICAL CARE, AND HEATING?: SOMEWHAT HARD

## 2024-04-19 SDOH — ECONOMIC STABILITY: FOOD INSECURITY: WITHIN THE PAST 12 MONTHS, YOU WORRIED THAT YOUR FOOD WOULD RUN OUT BEFORE YOU GOT MONEY TO BUY MORE.: NEVER TRUE

## 2024-04-19 ASSESSMENT — PATIENT HEALTH QUESTIONNAIRE - PHQ9
SUM OF ALL RESPONSES TO PHQ QUESTIONS 1-9: 0
SUM OF ALL RESPONSES TO PHQ9 QUESTIONS 1 & 2: 0
SUM OF ALL RESPONSES TO PHQ QUESTIONS 1-9: 0
SUM OF ALL RESPONSES TO PHQ QUESTIONS 1-9: 0
2. FEELING DOWN, DEPRESSED OR HOPELESS: NOT AT ALL
1. LITTLE INTEREST OR PLEASURE IN DOING THINGS: NOT AT ALL
SUM OF ALL RESPONSES TO PHQ QUESTIONS 1-9: 0

## 2024-04-19 NOTE — PROGRESS NOTES
patient  reviewed diet, exercise and weight control  cardiovascular risk and specific lipid/LDL goals reviewed  reviewed medications and side effects in detail  Recommend referral to neurology regarding spontaneous dropping items from the left hand.  Above problems addressed individually and labs reviewed with patient. All questions answered to patient's satisfaction.  Refills were completed on the above as well.          IVELISSE Delgadillo - NP    Dictated using voice recognition software. Proofread, but unrecognized voice recognition errors may exist.

## 2024-05-15 DIAGNOSIS — I10 ESSENTIAL HYPERTENSION: ICD-10-CM

## 2024-05-15 DIAGNOSIS — E78.2 MIXED HYPERLIPIDEMIA: ICD-10-CM

## 2024-05-16 RX ORDER — AMLODIPINE BESYLATE 5 MG/1
5 TABLET ORAL DAILY
Qty: 90 TABLET | Refills: 3 | OUTPATIENT
Start: 2024-05-16

## 2024-05-16 RX ORDER — ROSUVASTATIN CALCIUM 20 MG/1
20 TABLET, COATED ORAL DAILY
Qty: 90 TABLET | Refills: 3 | OUTPATIENT
Start: 2024-05-16

## 2024-05-16 RX ORDER — ATENOLOL 100 MG/1
100 TABLET ORAL DAILY
Qty: 90 TABLET | Refills: 3 | OUTPATIENT
Start: 2024-05-16

## 2024-06-19 ENCOUNTER — PATIENT MESSAGE (OUTPATIENT)
Dept: ORTHOPEDIC SURGERY | Age: 64
End: 2024-06-19

## 2024-06-19 NOTE — TELEPHONE ENCOUNTER
From: Seb Sullivan  To: Dr. Abraham Tate  Sent: 6/18/2024 1:00 PM EDT  Subject: Appointment Request    Appointment Request From: Seb Sullivan    [facility] Provider[facility]    Preferred Date Range: 6/18/2024 – 6/25/2024    Preferred Times: Any Time    Reason for visit: Request an Appointment    Comments:  Knee injection

## 2024-07-18 ENCOUNTER — OFFICE VISIT (OUTPATIENT)
Dept: ORTHOPEDIC SURGERY | Age: 64
End: 2024-07-18
Payer: MEDICARE

## 2024-07-18 DIAGNOSIS — M17.12 PRIMARY OSTEOARTHRITIS OF LEFT KNEE: Primary | ICD-10-CM

## 2024-07-18 PROCEDURE — 3017F COLORECTAL CA SCREEN DOC REV: CPT | Performed by: ORTHOPAEDIC SURGERY

## 2024-07-18 PROCEDURE — 20610 DRAIN/INJ JOINT/BURSA W/O US: CPT | Performed by: ORTHOPAEDIC SURGERY

## 2024-07-18 PROCEDURE — 99214 OFFICE O/P EST MOD 30 MIN: CPT | Performed by: ORTHOPAEDIC SURGERY

## 2024-07-18 PROCEDURE — 1036F TOBACCO NON-USER: CPT | Performed by: ORTHOPAEDIC SURGERY

## 2024-07-18 PROCEDURE — 1123F ACP DISCUSS/DSCN MKR DOCD: CPT | Performed by: ORTHOPAEDIC SURGERY

## 2024-07-18 PROCEDURE — G8417 CALC BMI ABV UP PARAM F/U: HCPCS | Performed by: ORTHOPAEDIC SURGERY

## 2024-07-18 PROCEDURE — G8428 CUR MEDS NOT DOCUMENT: HCPCS | Performed by: ORTHOPAEDIC SURGERY

## 2024-07-18 RX ORDER — METHYLPREDNISOLONE ACETATE 40 MG/ML
40 INJECTION, SUSPENSION INTRA-ARTICULAR; INTRALESIONAL; INTRAMUSCULAR; SOFT TISSUE ONCE
Status: COMPLETED | OUTPATIENT
Start: 2024-07-18 | End: 2024-07-18

## 2024-07-18 RX ADMIN — METHYLPREDNISOLONE ACETATE 40 MG: 40 INJECTION, SUSPENSION INTRA-ARTICULAR; INTRALESIONAL; INTRAMUSCULAR; SOFT TISSUE at 15:50
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
## 2024-10-22 DIAGNOSIS — R73.01 FASTING HYPERGLYCEMIA: ICD-10-CM

## 2024-10-22 DIAGNOSIS — Z12.5 ENCOUNTER FOR SCREENING FOR MALIGNANT NEOPLASM OF PROSTATE: ICD-10-CM

## 2024-10-22 DIAGNOSIS — I10 ESSENTIAL HYPERTENSION: ICD-10-CM

## 2024-10-22 DIAGNOSIS — E78.2 MIXED HYPERLIPIDEMIA: ICD-10-CM

## 2024-10-22 LAB
ALBUMIN SERPL-MCNC: 4.5 G/DL (ref 3.2–4.6)
ALBUMIN/GLOB SERPL: 1.8 (ref 1–1.9)
ALP SERPL-CCNC: 59 U/L (ref 40–129)
ALT SERPL-CCNC: 20 U/L (ref 8–55)
ANION GAP SERPL CALC-SCNC: 12 MMOL/L (ref 9–18)
AST SERPL-CCNC: 20 U/L (ref 15–37)
BASOPHILS # BLD: 0.1 K/UL (ref 0–0.2)
BASOPHILS NFR BLD: 1 % (ref 0–2)
BILIRUB SERPL-MCNC: 0.6 MG/DL (ref 0–1.2)
BUN SERPL-MCNC: 19 MG/DL (ref 8–23)
CALCIUM SERPL-MCNC: 9.5 MG/DL (ref 8.8–10.2)
CHLORIDE SERPL-SCNC: 108 MMOL/L (ref 98–107)
CHOLEST SERPL-MCNC: 119 MG/DL (ref 0–200)
CO2 SERPL-SCNC: 23 MMOL/L (ref 20–28)
CREAT SERPL-MCNC: 0.99 MG/DL (ref 0.8–1.3)
DIFFERENTIAL METHOD BLD: NORMAL
EOSINOPHIL # BLD: 0.4 K/UL (ref 0–0.8)
EOSINOPHIL NFR BLD: 6 % (ref 0.5–7.8)
ERYTHROCYTE [DISTWIDTH] IN BLOOD BY AUTOMATED COUNT: 14.3 % (ref 11.9–14.6)
EST. AVERAGE GLUCOSE BLD GHB EST-MCNC: 119 MG/DL
GLOBULIN SER CALC-MCNC: 2.5 G/DL (ref 2.3–3.5)
GLUCOSE SERPL-MCNC: 110 MG/DL (ref 70–99)
HBA1C MFR BLD: 5.8 % (ref 0–5.6)
HCT VFR BLD AUTO: 49.4 % (ref 41.1–50.3)
HDLC SERPL-MCNC: 41 MG/DL (ref 40–60)
HDLC SERPL: 2.9 (ref 0–5)
HGB BLD-MCNC: 16.5 G/DL (ref 13.6–17.2)
IMM GRANULOCYTES # BLD AUTO: 0 K/UL (ref 0–0.5)
IMM GRANULOCYTES NFR BLD AUTO: 0 % (ref 0–5)
LDLC SERPL CALC-MCNC: 60 MG/DL (ref 0–100)
LYMPHOCYTES # BLD: 1.4 K/UL (ref 0.5–4.6)
LYMPHOCYTES NFR BLD: 21 % (ref 13–44)
MCH RBC QN AUTO: 30.8 PG (ref 26.1–32.9)
MCHC RBC AUTO-ENTMCNC: 33.4 G/DL (ref 31.4–35)
MCV RBC AUTO: 92.3 FL (ref 82–102)
MONOCYTES # BLD: 0.6 K/UL (ref 0.1–1.3)
MONOCYTES NFR BLD: 8 % (ref 4–12)
NEUTS SEG # BLD: 4.4 K/UL (ref 1.7–8.2)
NEUTS SEG NFR BLD: 64 % (ref 43–78)
NRBC # BLD: 0 K/UL (ref 0–0.2)
PLATELET # BLD AUTO: 189 K/UL (ref 150–450)
PMV BLD AUTO: 12 FL (ref 9.4–12.3)
POTASSIUM SERPL-SCNC: 5 MMOL/L (ref 3.5–5.1)
PROT SERPL-MCNC: 7 G/DL (ref 6.3–8.2)
PSA SERPL-MCNC: 2.3 NG/ML (ref 0–4)
RBC # BLD AUTO: 5.35 M/UL (ref 4.23–5.6)
SODIUM SERPL-SCNC: 143 MMOL/L (ref 136–145)
TRIGL SERPL-MCNC: 95 MG/DL (ref 0–150)
TSH, 3RD GENERATION: 2.14 UIU/ML (ref 0.27–4.2)
VLDLC SERPL CALC-MCNC: 19 MG/DL (ref 6–23)
WBC # BLD AUTO: 6.9 K/UL (ref 4.3–11.1)

## 2024-10-23 ENCOUNTER — OFFICE VISIT (OUTPATIENT)
Dept: NEUROLOGY | Age: 64
End: 2024-10-23
Payer: MEDICARE

## 2024-10-23 VITALS
DIASTOLIC BLOOD PRESSURE: 86 MMHG | SYSTOLIC BLOOD PRESSURE: 134 MMHG | BODY MASS INDEX: 30.69 KG/M2 | OXYGEN SATURATION: 95 % | HEART RATE: 55 BPM | WEIGHT: 239 LBS

## 2024-10-23 DIAGNOSIS — R20.2 TINGLING OF BOTH UPPER EXTREMITIES: ICD-10-CM

## 2024-10-23 DIAGNOSIS — G62.1 ALCOHOL-INDUCED POLYNEUROPATHY (HCC): Primary | ICD-10-CM

## 2024-10-23 DIAGNOSIS — R29.898 WEAKNESS OF BOTH HANDS: ICD-10-CM

## 2024-10-23 DIAGNOSIS — M79.2 NEUROPATHIC PAIN: ICD-10-CM

## 2024-10-23 DIAGNOSIS — R26.9 IMPAIRED GAIT: ICD-10-CM

## 2024-10-23 LAB — FOLATE SERPL-MCNC: 11.8 NG/ML (ref 3.1–17.5)

## 2024-10-23 PROCEDURE — 1123F ACP DISCUSS/DSCN MKR DOCD: CPT | Performed by: PSYCHIATRY & NEUROLOGY

## 2024-10-23 PROCEDURE — 99205 OFFICE O/P NEW HI 60 MIN: CPT | Performed by: PSYCHIATRY & NEUROLOGY

## 2024-10-23 PROCEDURE — 3079F DIAST BP 80-89 MM HG: CPT | Performed by: PSYCHIATRY & NEUROLOGY

## 2024-10-23 PROCEDURE — 3075F SYST BP GE 130 - 139MM HG: CPT | Performed by: PSYCHIATRY & NEUROLOGY

## 2024-10-23 ASSESSMENT — PATIENT HEALTH QUESTIONNAIRE - PHQ9
SUM OF ALL RESPONSES TO PHQ QUESTIONS 1-9: 0
DEPRESSION UNABLE TO ASSESS: FUNCTIONAL CAPACITY MOTIVATION LIMITS ACCURACY
1. LITTLE INTEREST OR PLEASURE IN DOING THINGS: NOT AT ALL
SUM OF ALL RESPONSES TO PHQ QUESTIONS 1-9: 0
2. FEELING DOWN, DEPRESSED OR HOPELESS: NOT AT ALL
SUM OF ALL RESPONSES TO PHQ9 QUESTIONS 1 & 2: 0

## 2024-10-23 ASSESSMENT — ENCOUNTER SYMPTOMS
EYE REDNESS: 0
COUGH: 0
ABDOMINAL DISTENTION: 0
SORE THROAT: 0
SHORTNESS OF BREATH: 0
EYE DISCHARGE: 0
BACK PAIN: 1

## 2024-10-23 NOTE — PROGRESS NOTES
[facility] NEUROLOGY NOTE    Patient: Seb Sullivan  Physician: Kiara Reyes MD    CC:   Chief Complaint   Patient presents with    New Patient     Drop everything that pt picks up    Dizzy spells Some pain in the legs with cramps     PCP: Medina Bautista APRN - NP  Referred by: Medina Bautista APRN *     History of Present Illness:     Seb Sullivan is a 63 y.o. right-handed male with PMH of alcohol use disorder-currently in remission, hyperlipidemia, hypertension, presents for evaluation of bilateral hand weakness.  Patient reports weakness of his hands for about a year.  He frequently drops things when he holds it  He has trouble opening jars, holding phone.  He also feels more clumsy in general.  He does report pins and needle sensation and numbness in both hands usually in the fingertips, it is intermittent. He denies any neck pain but his neck feels stiff. No neck trauma or surgeries.  He also feels that he is the way he walks is affected as well.  He has been having trouble walking longer distances especially during golf games.  He also describes pain in his feet for years and associated muscle cramps which come and go.   He he works as . He feels his work is affected because of and his golf game is affected.   Gait is off sometimes. No leaning towards certain side. No falls.   Patient reports history of binge drinking for many years.  He is in remission for the last 2 years.  Previous history of cocaine use, stopped in 1995.   He does have a previous history of lower back issues and previously received injections.  He was seen by orthopedic doctors for that.  MRI L-spine reviewed and showed moderate stenosis.  Patient denies any history of neck trauma or surgeries  Patient reports that whenever he walks for long period of time he develops weakness in his legs until he sits for while at the bench.  Then he regains strength back.  Current Relevant Medications:   Current

## 2024-10-25 LAB — VIT B12 SERPL-MCNC: 497 PG/ML (ref 232–1245)

## 2024-10-27 LAB
VIT B1 BLD-SCNC: 145.1 NMOL/L (ref 66.5–200)
VIT B6 SERPL-MCNC: 35.7 UG/L (ref 3.4–65.2)

## 2024-10-29 LAB
IGA SERPL-MCNC: 176 MG/DL (ref 61–437)
IGG SERPL-MCNC: 745 MG/DL (ref 603–1613)
IGM SERPL-MCNC: 124 MG/DL (ref 20–172)
PROT PATTERN SERPL IFE-IMP: NORMAL

## 2025-01-17 ENCOUNTER — PROCEDURE VISIT (OUTPATIENT)
Dept: NEUROLOGY | Age: 65
End: 2025-01-17

## 2025-01-17 VITALS — BODY MASS INDEX: 30.69 KG/M2 | HEART RATE: 69 BPM | OXYGEN SATURATION: 98 % | HEIGHT: 74 IN

## 2025-01-17 DIAGNOSIS — R20.2 TINGLING OF BOTH UPPER EXTREMITIES: Primary | ICD-10-CM

## 2025-01-17 DIAGNOSIS — G62.1 ALCOHOL-INDUCED POLYNEUROPATHY (HCC): ICD-10-CM

## 2025-01-17 NOTE — PROGRESS NOTES
EMG/Nerve Conduction Study Procedure Note  [address]    [address]  [place]  [address]   [phone]      Hx:    Exam:     64 y.o. W  male     EMG::    saul upper ext.  Paresthesias.  History neuropathic pain.  No [place].  No Edilberto.  No atrophy.  Paresthesia of fingers.  Referring:    Dr Reyes  Technologist ::   Yamil Kruse  Hgt:      6'2\"        Summary    needle EMG selected muscles upper with CV studies.                  Controlled environmental factors / EMG lab.  Temperature.   NCV : sensory segments:       Abnormal = bilateral median and bilateral ulnar slowing with attenuated SNAP.  Abnormal bilateral radial = attenuated SNAP.     NCV transcarpal sensory segments:    Abnormal = bilateral median transcarpal slowing with prolonged bilateral peak differences of latencies = right side 1.04 ms (UL = 0.20 ms); left side 0.77 ms.      NCV Motor MCV segments:         Normal limits bilateral median ulnar MCV.  F-wave studies:      Abnormal = basically median ulnar F waves are prolonged more in the median.      NEEDLE EMG:   Tested muscles::    Basically normal bilateral FDI APB except for at the APB bilateral with some mixed discrete activity with reduced recruitment only.  Equivocal giant MUP on the right APB.                INTERPRETATION:   ELECTROPHYSIOLOGIC ABNORMALITIES REVEALING DISTAL SENSORY AND SENSORIMOTOR POLYNEUROPATHY WITHOUT SIGNIFICANT AXONAL DENERVATION.              CONCLUSION:    Distal symmetric sensorimotor polyneuropathy.                Procedure Details:     Correlates with his clinical situation.    Patient made aware.                  Please Note::     Data and waveforms * filed under Procedure.    See Procedure Files for data pages.       [ *NOTE:  parts or all of this report are produced using artificial voice recognition software.  Some speech errors are inherent in such software and may be included in the produced record. ]           Sekou Gaffney MD  Consultative

## 2025-01-28 ENCOUNTER — OFFICE VISIT (OUTPATIENT)
Dept: ORTHOPEDIC SURGERY | Age: 65
End: 2025-01-28
Payer: MEDICARE

## 2025-01-28 DIAGNOSIS — M17.12 PRIMARY OSTEOARTHRITIS OF LEFT KNEE: Primary | ICD-10-CM

## 2025-01-28 PROCEDURE — 20610 DRAIN/INJ JOINT/BURSA W/O US: CPT | Performed by: ORTHOPAEDIC SURGERY

## 2025-01-28 PROCEDURE — 1123F ACP DISCUSS/DSCN MKR DOCD: CPT | Performed by: ORTHOPAEDIC SURGERY

## 2025-01-28 PROCEDURE — G8417 CALC BMI ABV UP PARAM F/U: HCPCS | Performed by: ORTHOPAEDIC SURGERY

## 2025-01-28 PROCEDURE — 3017F COLORECTAL CA SCREEN DOC REV: CPT | Performed by: ORTHOPAEDIC SURGERY

## 2025-01-28 PROCEDURE — 99214 OFFICE O/P EST MOD 30 MIN: CPT | Performed by: ORTHOPAEDIC SURGERY

## 2025-01-28 PROCEDURE — G8428 CUR MEDS NOT DOCUMENT: HCPCS | Performed by: ORTHOPAEDIC SURGERY

## 2025-01-28 PROCEDURE — 1036F TOBACCO NON-USER: CPT | Performed by: ORTHOPAEDIC SURGERY

## 2025-01-28 RX ORDER — METHYLPREDNISOLONE ACETATE 40 MG/ML
40 INJECTION, SUSPENSION INTRA-ARTICULAR; INTRALESIONAL; INTRAMUSCULAR; SOFT TISSUE ONCE
Status: COMPLETED | OUTPATIENT
Start: 2025-01-28 | End: 2025-01-28

## 2025-01-28 RX ADMIN — METHYLPREDNISOLONE ACETATE 40 MG: 40 INJECTION, SUSPENSION INTRA-ARTICULAR; INTRALESIONAL; INTRAMUSCULAR; SOFT TISSUE at 10:00

## 2025-01-28 NOTE — PROGRESS NOTES
patient has failed the aforementioned treatment modalities and would like to proceed with Corticosteroid Injection. We discussed potential risks of steroid injection including but not limited to steroid flare with an associated increase in pain, fat necrosis, skin discoloration around the injection site, temporary increase in blood sugars in diabetic patients and possible facial flushing after injection.    TREATMENT:    Steroid Injection - Risks, benefits, and alternatives of corticosteroid injection were discussed. A timeout was performed which included identifying the patient by name and date of birth, verifying correct procedure and correct site(s).  After prepping the injection site the left knee was injected with 1cc of 40mg Depomedrol/3cc marcaine. Patient tolerated the procedure well. Patient is instructed to ice the joint for next few days if they experience discomfort. The patient will followup as directed or as needed.    Signed By: Abraham Tate MD  January 28, 2025    -

## 2025-04-02 ENCOUNTER — OFFICE VISIT (OUTPATIENT)
Dept: NEUROLOGY | Age: 65
End: 2025-04-02
Payer: MEDICARE

## 2025-04-02 VITALS
WEIGHT: 250 LBS | OXYGEN SATURATION: 93 % | SYSTOLIC BLOOD PRESSURE: 136 MMHG | DIASTOLIC BLOOD PRESSURE: 86 MMHG | HEART RATE: 57 BPM | BODY MASS INDEX: 32.1 KG/M2

## 2025-04-02 DIAGNOSIS — G62.1 ALCOHOL-INDUCED POLYNEUROPATHY: Primary | ICD-10-CM

## 2025-04-02 PROCEDURE — 3075F SYST BP GE 130 - 139MM HG: CPT | Performed by: PSYCHIATRY & NEUROLOGY

## 2025-04-02 PROCEDURE — 3017F COLORECTAL CA SCREEN DOC REV: CPT | Performed by: PSYCHIATRY & NEUROLOGY

## 2025-04-02 PROCEDURE — 1036F TOBACCO NON-USER: CPT | Performed by: PSYCHIATRY & NEUROLOGY

## 2025-04-02 PROCEDURE — 99214 OFFICE O/P EST MOD 30 MIN: CPT | Performed by: PSYCHIATRY & NEUROLOGY

## 2025-04-02 PROCEDURE — G8427 DOCREV CUR MEDS BY ELIG CLIN: HCPCS | Performed by: PSYCHIATRY & NEUROLOGY

## 2025-04-02 PROCEDURE — 1159F MED LIST DOCD IN RCRD: CPT | Performed by: PSYCHIATRY & NEUROLOGY

## 2025-04-02 PROCEDURE — 3079F DIAST BP 80-89 MM HG: CPT | Performed by: PSYCHIATRY & NEUROLOGY

## 2025-04-02 PROCEDURE — 1160F RVW MEDS BY RX/DR IN RCRD: CPT | Performed by: PSYCHIATRY & NEUROLOGY

## 2025-04-02 PROCEDURE — G8417 CALC BMI ABV UP PARAM F/U: HCPCS | Performed by: PSYCHIATRY & NEUROLOGY

## 2025-04-02 PROCEDURE — 1123F ACP DISCUSS/DSCN MKR DOCD: CPT | Performed by: PSYCHIATRY & NEUROLOGY

## 2025-04-02 RX ORDER — LANOLIN ALCOHOL/MO/W.PET/CERES
1000 CREAM (GRAM) TOPICAL DAILY
COMMUNITY

## 2025-04-02 RX ORDER — M-VIT,TX,IRON,MINS/CALC/FOLIC 27MG-0.4MG
1 TABLET ORAL DAILY
COMMUNITY

## 2025-04-02 NOTE — PROGRESS NOTES
Insecurity: No Food Insecurity (4/19/2024)    Hunger Vital Sign     Worried About Running Out of Food in the Last Year: Never true     Ran Out of Food in the Last Year: Never true   Transportation Needs: Unknown (4/19/2024)    PRAPARE - Transportation     Lack of Transportation (Non-Medical): No   Housing Stability: Unknown (4/19/2024)    Housing Stability Vital Sign     Unstable Housing in the Last Year: No         Medications/Allergies:   MEDICATIONS:   Outpatient Encounter Medications as of 4/2/2025   Medication Sig Dispense Refill    Multiple Vitamins-Minerals (THERAPEUTIC MULTIVITAMIN-MINERALS) tablet Take 1 tablet by mouth daily      vitamin B-12 (CYANOCOBALAMIN) 1000 MCG tablet Take 1 tablet by mouth daily      melatonin 3 MG TABS tablet Take 1 tablet by mouth daily      naproxen sodium (ALEVE) 220 MG tablet Take 1 tablet by mouth daily      amLODIPine (NORVASC) 5 MG tablet Take 1 tablet by mouth daily 100 tablet 3    atenolol (TENORMIN) 100 MG tablet Take 1 tablet by mouth daily 100 tablet 3    rosuvastatin (CRESTOR) 20 MG tablet Take 1 tablet by mouth daily 100 tablet 3    clobetasol (TEMOVATE) 0.05 % ointment       aspirin 81 MG chewable tablet Take 1 tablet by mouth daily 1 tablet 0     No facility-administered encounter medications on file as of 4/2/2025.       ALLERGIES:  Allergies   Allergen Reactions    Ramelteon Other (See Comments)    Celecoxib Rash    Sulfa Antibiotics Rash    Zolpidem Anxiety       Physical Exam:   /86 (BP Site: Right Upper Arm, Patient Position: Sitting)   Pulse 57   Wt 113.4 kg (250 lb)   SpO2 93%   BMI 32.10 kg/m²     Physical and Neurological Exam  General: no acute distress, cooperative  HEENT: normocephalic, anicteric sclerae, MMM  Cardiovascular: RRR  Respiratory: good air entry b/l, unlabored breathing  Gastrointestinal: non-distended   Extremities: no peripheral edema   Skin: warm, dry, no rash  Neurological Exam   Mental Status: AOx3, following commands,

## 2025-04-15 ENCOUNTER — OFFICE VISIT (OUTPATIENT)
Dept: INTERNAL MEDICINE CLINIC | Facility: CLINIC | Age: 65
End: 2025-04-15
Payer: MEDICARE

## 2025-04-15 VITALS
HEIGHT: 74 IN | HEART RATE: 57 BPM | OXYGEN SATURATION: 92 % | WEIGHT: 248.2 LBS | BODY MASS INDEX: 31.85 KG/M2 | SYSTOLIC BLOOD PRESSURE: 132 MMHG | DIASTOLIC BLOOD PRESSURE: 74 MMHG

## 2025-04-15 DIAGNOSIS — Z13.1 DIABETES MELLITUS SCREENING: ICD-10-CM

## 2025-04-15 DIAGNOSIS — Z12.5 PROSTATE CANCER SCREENING: ICD-10-CM

## 2025-04-15 DIAGNOSIS — I10 ESSENTIAL HYPERTENSION: ICD-10-CM

## 2025-04-15 DIAGNOSIS — Z87.891 PERSONAL HISTORY OF TOBACCO USE: ICD-10-CM

## 2025-04-15 DIAGNOSIS — M17.12 PRIMARY OSTEOARTHRITIS OF LEFT KNEE: ICD-10-CM

## 2025-04-15 DIAGNOSIS — Z00.00 MEDICARE ANNUAL WELLNESS VISIT, SUBSEQUENT: Primary | ICD-10-CM

## 2025-04-15 DIAGNOSIS — E78.2 MIXED HYPERLIPIDEMIA: ICD-10-CM

## 2025-04-15 LAB
ALBUMIN SERPL-MCNC: 4.5 G/DL (ref 3.2–4.6)
ALBUMIN/GLOB SERPL: 1.6 (ref 1–1.9)
ALP SERPL-CCNC: 58 U/L (ref 40–129)
ALT SERPL-CCNC: 24 U/L (ref 8–55)
ANION GAP SERPL CALC-SCNC: 9 MMOL/L (ref 7–16)
AST SERPL-CCNC: 26 U/L (ref 15–37)
BASOPHILS # BLD: 0.08 K/UL (ref 0–0.2)
BASOPHILS NFR BLD: 1.1 % (ref 0–2)
BILIRUB SERPL-MCNC: 0.6 MG/DL (ref 0–1.2)
BUN SERPL-MCNC: 14 MG/DL (ref 8–23)
CALCIUM SERPL-MCNC: 9.7 MG/DL (ref 8.8–10.2)
CHLORIDE SERPL-SCNC: 106 MMOL/L (ref 98–107)
CHOLEST SERPL-MCNC: 118 MG/DL (ref 0–200)
CO2 SERPL-SCNC: 25 MMOL/L (ref 20–29)
CREAT SERPL-MCNC: 0.96 MG/DL (ref 0.8–1.3)
DIFFERENTIAL METHOD BLD: NORMAL
EOSINOPHIL # BLD: 0.54 K/UL (ref 0–0.8)
EOSINOPHIL NFR BLD: 7.3 % (ref 0.5–7.8)
ERYTHROCYTE [DISTWIDTH] IN BLOOD BY AUTOMATED COUNT: 14.2 % (ref 11.9–14.6)
EST. AVERAGE GLUCOSE BLD GHB EST-MCNC: 122 MG/DL
GLOBULIN SER CALC-MCNC: 2.8 G/DL (ref 2.3–3.5)
GLUCOSE SERPL-MCNC: 107 MG/DL (ref 70–99)
HBA1C MFR BLD: 5.9 % (ref 0–5.6)
HCT VFR BLD AUTO: 49.8 % (ref 41.1–50.3)
HDLC SERPL-MCNC: 37 MG/DL (ref 40–60)
HDLC SERPL: 3.2 (ref 0–5)
HGB BLD-MCNC: 16.4 G/DL (ref 13.6–17.2)
IMM GRANULOCYTES # BLD AUTO: 0.02 K/UL (ref 0–0.5)
IMM GRANULOCYTES NFR BLD AUTO: 0.3 % (ref 0–5)
LDLC SERPL CALC-MCNC: 59 MG/DL (ref 0–100)
LYMPHOCYTES # BLD: 1.41 K/UL (ref 0.5–4.6)
LYMPHOCYTES NFR BLD: 19 % (ref 13–44)
MCH RBC QN AUTO: 31.3 PG (ref 26.1–32.9)
MCHC RBC AUTO-ENTMCNC: 32.9 G/DL (ref 31.4–35)
MCV RBC AUTO: 95 FL (ref 82–102)
MONOCYTES # BLD: 0.73 K/UL (ref 0.1–1.3)
MONOCYTES NFR BLD: 9.9 % (ref 4–12)
NEUTS SEG # BLD: 4.63 K/UL (ref 1.7–8.2)
NEUTS SEG NFR BLD: 62.4 % (ref 43–78)
NRBC # BLD: 0 K/UL (ref 0–0.2)
PLATELET # BLD AUTO: 225 K/UL (ref 150–450)
PMV BLD AUTO: 11.4 FL (ref 9.4–12.3)
POTASSIUM SERPL-SCNC: 4.9 MMOL/L (ref 3.5–5.1)
PROT SERPL-MCNC: 7.3 G/DL (ref 6.3–8.2)
PSA SERPL-MCNC: 2.4 NG/ML (ref 0–4)
RBC # BLD AUTO: 5.24 M/UL (ref 4.23–5.6)
SODIUM SERPL-SCNC: 141 MMOL/L (ref 136–145)
TRIGL SERPL-MCNC: 110 MG/DL (ref 0–150)
TSH W FREE THYROID IF ABNORMAL: 1.27 UIU/ML (ref 0.27–4.2)
VLDLC SERPL CALC-MCNC: 22 MG/DL (ref 6–23)
WBC # BLD AUTO: 7.4 K/UL (ref 4.3–11.1)

## 2025-04-15 PROCEDURE — 1159F MED LIST DOCD IN RCRD: CPT

## 2025-04-15 PROCEDURE — G0438 PPPS, INITIAL VISIT: HCPCS

## 2025-04-15 PROCEDURE — 1160F RVW MEDS BY RX/DR IN RCRD: CPT

## 2025-04-15 PROCEDURE — 99214 OFFICE O/P EST MOD 30 MIN: CPT

## 2025-04-15 PROCEDURE — 3075F SYST BP GE 130 - 139MM HG: CPT

## 2025-04-15 PROCEDURE — G0296 VISIT TO DETERM LDCT ELIG: HCPCS

## 2025-04-15 PROCEDURE — 3017F COLORECTAL CA SCREEN DOC REV: CPT

## 2025-04-15 PROCEDURE — 1123F ACP DISCUSS/DSCN MKR DOCD: CPT

## 2025-04-15 PROCEDURE — 3078F DIAST BP <80 MM HG: CPT

## 2025-04-15 RX ORDER — AMLODIPINE BESYLATE 5 MG/1
5 TABLET ORAL DAILY
Qty: 100 TABLET | Refills: 1 | Status: SHIPPED | OUTPATIENT
Start: 2025-04-15

## 2025-04-15 RX ORDER — ROSUVASTATIN CALCIUM 20 MG/1
20 TABLET, COATED ORAL DAILY
Qty: 100 TABLET | Refills: 1 | Status: SHIPPED | OUTPATIENT
Start: 2025-04-15

## 2025-04-15 RX ORDER — ATENOLOL 100 MG/1
100 TABLET ORAL DAILY
Qty: 100 TABLET | Refills: 1 | Status: SHIPPED | OUTPATIENT
Start: 2025-04-15

## 2025-04-15 SDOH — ECONOMIC STABILITY: FOOD INSECURITY: WITHIN THE PAST 12 MONTHS, YOU WORRIED THAT YOUR FOOD WOULD RUN OUT BEFORE YOU GOT MONEY TO BUY MORE.: NEVER TRUE

## 2025-04-15 SDOH — ECONOMIC STABILITY: FOOD INSECURITY: WITHIN THE PAST 12 MONTHS, THE FOOD YOU BOUGHT JUST DIDN'T LAST AND YOU DIDN'T HAVE MONEY TO GET MORE.: NEVER TRUE

## 2025-04-15 ASSESSMENT — PATIENT HEALTH QUESTIONNAIRE - PHQ9
SUM OF ALL RESPONSES TO PHQ QUESTIONS 1-9: 0
2. FEELING DOWN, DEPRESSED OR HOPELESS: NOT AT ALL
SUM OF ALL RESPONSES TO PHQ QUESTIONS 1-9: 0
SUM OF ALL RESPONSES TO PHQ QUESTIONS 1-9: 0
1. LITTLE INTEREST OR PLEASURE IN DOING THINGS: NOT AT ALL
1. LITTLE INTEREST OR PLEASURE IN DOING THINGS: NOT AT ALL
2. FEELING DOWN, DEPRESSED OR HOPELESS: NOT AT ALL
SUM OF ALL RESPONSES TO PHQ QUESTIONS 1-9: 0

## 2025-04-15 ASSESSMENT — ENCOUNTER SYMPTOMS
COUGH: 0
SHORTNESS OF BREATH: 0

## 2025-04-15 ASSESSMENT — LIFESTYLE VARIABLES
HOW MANY STANDARD DRINKS CONTAINING ALCOHOL DO YOU HAVE ON A TYPICAL DAY: PATIENT DOES NOT DRINK
HOW OFTEN DO YOU HAVE A DRINK CONTAINING ALCOHOL: NEVER

## 2025-04-15 ASSESSMENT — VISUAL ACUITY: OU: 1

## 2025-04-15 NOTE — PROGRESS NOTES
Medicare Annual Wellness Visit    Seb Crocker Nate is here for New Patient and Medicare AWV    Assessment & Plan   Medicare annual wellness visit, subsequent    Age-appropriate anticipatory guidance provided. Personal healthcare goals and preferences were discussed and considered. Encouraged to start or maintain healthy diet and to engage in frequent exercise. Vaccines reviewed- encouraged to obtain outstanding vaccines at a local commercial pharmacy.       2. Personal history of tobacco use     Agreeable to lung cancer screening- plan pending results.     -     IA VISIT TO DISCUSS LUNG CA SCREEN W LDCT  -     CT LUNG CANCER SCREENING (INITIAL/ANNUAL); Future    3. Prostate cancer screening      Labs to be completed on the way out to monitor for this and other diagnoses as ordered. Will follow-up with results via [employer] or have staff reach out with next steps if needed.      -     PSA Screening; Future    4. Diabetes mellitus screening  -     Hemoglobin A1C; Future         Return in 1 year (on 4/15/2026) for Medicare AWV 3 months f/u knee and chronic conditions.     Subjective     States he is doing well. He is fasting today for labs. States diet has been balanced including fruits, vegetables and proteins. Patient is exercising regularly. Reports occasional alcohol use and does not use tobacco products anymore. Does not see the dentist regularly (dentures) and has not kept up with opthalmologic exams as appropriate. When patient is out in sun for extended periods of time sun block is used. Reports stable mood. ROS below for purpose of exam today.      Patient's complete Health Risk Assessment and screening values have been reviewed and are found in Flowsheets. The following problems were reviewed today and where indicated follow up appointments were made and/or referrals ordered.    Positive Risk Factor Screenings with Interventions:    Fall Risk:  Do you feel unsteady or are you worried about falling? : (!)

## 2025-04-15 NOTE — PROGRESS NOTES
Seb Sullivan (:  1960) is a 64 y.o. male,Established patient, here for evaluation of the following chief complaint(s):  New Patient and Medicare AWV    Patient Active Problem List   Diagnosis    Overweight    Family history of coronary artery disease in mother    Chronic vesicular hand dermatitis    Hyperlipidemia    Elevated coronary artery calcium score    Viral warts    Statin myopathy    Special screening for malignant neoplasm of prostate    Degenerative arthritis of knee    Knee effusion    Essential hypertension    OA (osteoarthritis) of knee    Fasting hyperglycemia    Family history of Alzheimer's disease    Hyperplastic polyp of sigmoid colon    TGA (transient global amnesia)    Diastolic dysfunction    Mitral regurgitation    Ascending aorta dilation    CAD (coronary artery disease)    Alcohol-induced polyneuropathy     4/15/25:  Getting Left knee replacement. Scheduling the surgery with Dr. Tate. Had right knee replacement with Dr. Tate in 2018 and was pleased with results. Last knee injection was in January and it didn't work.   HTN- BP adequately controlled on antihypertensive therapy. Tolerating medication well. Pt denies adverse effects of medication, as well as chest pain, dizziness, palpitations, swelling, and headache.    HLD- Tolerating statin therapy with no adverse effects. Reports avoiding high-fat foods when possible. Endorses active exercise regimen with indoor and outdoor cycling.            Review of Systems   Constitutional:  Negative for chills, fever and unexpected weight change.   Eyes:  Negative for visual disturbance.   Respiratory:  Negative for cough and shortness of breath.    Cardiovascular:  Negative for chest pain.   Endocrine: Negative for polydipsia, polyphagia and polyuria.   Musculoskeletal:  Positive for arthralgias.   Skin:  Negative for rash.        Visit Vitals  /74 (BP Site: Left Upper Arm, Patient Position: Sitting, BP Cuff Size: Small

## 2025-04-18 ENCOUNTER — RESULTS FOLLOW-UP (OUTPATIENT)
Dept: INTERNAL MEDICINE CLINIC | Facility: CLINIC | Age: 65
End: 2025-04-18

## 2025-04-29 ENCOUNTER — OFFICE VISIT (OUTPATIENT)
Dept: ORTHOPEDIC SURGERY | Age: 65
End: 2025-04-29
Payer: MEDICARE

## 2025-04-29 DIAGNOSIS — M17.12 PRIMARY OSTEOARTHRITIS OF LEFT KNEE: Primary | ICD-10-CM

## 2025-04-29 PROCEDURE — 1036F TOBACCO NON-USER: CPT | Performed by: ORTHOPAEDIC SURGERY

## 2025-04-29 PROCEDURE — G8417 CALC BMI ABV UP PARAM F/U: HCPCS | Performed by: ORTHOPAEDIC SURGERY

## 2025-04-29 PROCEDURE — G8428 CUR MEDS NOT DOCUMENT: HCPCS | Performed by: ORTHOPAEDIC SURGERY

## 2025-04-29 PROCEDURE — 99214 OFFICE O/P EST MOD 30 MIN: CPT | Performed by: ORTHOPAEDIC SURGERY

## 2025-04-29 PROCEDURE — 3017F COLORECTAL CA SCREEN DOC REV: CPT | Performed by: ORTHOPAEDIC SURGERY

## 2025-04-29 PROCEDURE — 1123F ACP DISCUSS/DSCN MKR DOCD: CPT | Performed by: ORTHOPAEDIC SURGERY

## 2025-04-29 NOTE — PROGRESS NOTES
Current packs/day: 0.00     Average packs/day: 0.5 packs/day for 45.0 years (22.5 ttl pk-yrs)     Types: Cigarettes     Start date: 1977     Quit date: 2022     Years since quittin.4    Smokeless tobacco: Never   Substance Use Topics    Alcohol use: No     Alcohol/week: 0.0 standard drinks of alcohol         Allergies:      Allergies   Allergen Reactions    Ramelteon Other (See Comments)    Celecoxib Rash    Sulfa Antibiotics Rash    Zolpidem Anxiety        Vitals:   There were no vitals taken for this visit.    ROS:   Review of Systems         Objective:   General: Patient is awake and in no acute distress  Psych: Mood and affect appropriate  HEENT: Normocephalic. Atramatic. Pupils equal, round and reactive. Sclera normal.   Neck: Supple without obvious mass   Chest: Symmetric  Lungs:  Breathing non-labored. No tachypnea noted.  Abdomen: Soft on gross examination without obvious distention.  Neuro: No obvious neurologic deficit. Grossly moves bilateral upper extremities without motor or sensory deficits. No gross weakness noted in the lower extremities. No hyporeflexia or hyperreflexia noted.  Vascular: No gross arterial or venous deficiency noted. DP and PT pulses are palpable in the lower extremities  Lymphatic: No lymphedema noted in the lower extremities.  Skin: No prior incisions noted about the right knee. No obvious rashes noted about the area. No skin changes noted about the knee or about the adjacent thigh or leg.  Extremities:  Patient ambulates with an antalgic gait. There is pain with ROM of the right knee. Range of motion is 0-120. Trace effusion noted in the knee. Patellofemoral crepitus is present. Distally the patient shows no neurologic deficit.        Xrays (obtained either today or previously)    Heading: Knee Xrays  Views: AP knee, skiers PA, lateral knee, sunrise view left knee  Clinical indication: Left Knee Pain   Findings: Xrays of the knees obtained today or previously show

## 2025-04-30 DIAGNOSIS — M17.12 PRIMARY OSTEOARTHRITIS OF LEFT KNEE: Primary | ICD-10-CM

## 2025-05-08 DIAGNOSIS — Z13.1 SCREENING FOR DIABETES MELLITUS: ICD-10-CM

## 2025-05-08 DIAGNOSIS — M17.12 PRIMARY OSTEOARTHRITIS OF LEFT KNEE: Primary | ICD-10-CM

## 2025-05-17 DIAGNOSIS — G89.18 POSTOPERATIVE PAIN: Primary | ICD-10-CM

## 2025-05-17 RX ORDER — MELOXICAM 15 MG/1
15 TABLET ORAL DAILY
Qty: 30 TABLET | Refills: 2 | Status: SHIPPED | OUTPATIENT
Start: 2025-05-17

## 2025-05-17 RX ORDER — SENNOSIDES A AND B 8.6 MG/1
1 TABLET, FILM COATED ORAL 2 TIMES DAILY
Qty: 30 TABLET | Refills: 2 | Status: SHIPPED | OUTPATIENT
Start: 2025-05-17

## 2025-05-17 RX ORDER — OMEPRAZOLE 40 MG/1
40 CAPSULE, DELAYED RELEASE ORAL DAILY
Qty: 30 CAPSULE | Refills: 0 | Status: SHIPPED | OUTPATIENT
Start: 2025-05-17

## 2025-05-17 RX ORDER — ZOLPIDEM TARTRATE 5 MG/1
5 TABLET ORAL NIGHTLY PRN
Qty: 60 TABLET | Refills: 0 | Status: SHIPPED | OUTPATIENT
Start: 2025-05-17 | End: 2025-07-16

## 2025-05-17 RX ORDER — OXYCODONE HYDROCHLORIDE 5 MG/1
10 TABLET ORAL EVERY 4 HOURS PRN
Qty: 60 TABLET | Refills: 0 | Status: SHIPPED | OUTPATIENT
Start: 2025-05-17 | End: 2025-05-24

## 2025-05-17 RX ORDER — ONDANSETRON 4 MG/1
4 TABLET, FILM COATED ORAL 3 TIMES DAILY PRN
Qty: 30 TABLET | Refills: 1 | Status: SHIPPED | OUTPATIENT
Start: 2025-05-17

## 2025-05-17 RX ORDER — ACETAMINOPHEN 500 MG
1000 TABLET ORAL EVERY 6 HOURS PRN
Qty: 180 TABLET | Refills: 2 | Status: SHIPPED | OUTPATIENT
Start: 2025-05-17

## 2025-05-17 RX ORDER — ASPIRIN 81 MG/1
81 TABLET ORAL 2 TIMES DAILY
Qty: 60 TABLET | Refills: 0 | Status: SHIPPED | OUTPATIENT
Start: 2025-05-17

## 2025-05-20 DIAGNOSIS — Z13.1 SCREENING FOR DIABETES MELLITUS: ICD-10-CM

## 2025-05-20 DIAGNOSIS — M17.12 PRIMARY OSTEOARTHRITIS OF LEFT KNEE: ICD-10-CM

## 2025-05-20 LAB
ALBUMIN SERPL-MCNC: 4.4 G/DL (ref 3.2–4.6)
ALBUMIN/GLOB SERPL: 1.4 (ref 1–1.9)
ALP SERPL-CCNC: 68 U/L (ref 40–129)
ALT SERPL-CCNC: 24 U/L (ref 8–55)
ANION GAP SERPL CALC-SCNC: 13 MMOL/L (ref 7–16)
AST SERPL-CCNC: 26 U/L (ref 15–37)
BASOPHILS # BLD: 0.11 K/UL (ref 0–0.2)
BASOPHILS NFR BLD: 1.6 % (ref 0–2)
BILIRUB SERPL-MCNC: 0.5 MG/DL (ref 0–1.2)
BUN SERPL-MCNC: 15 MG/DL (ref 8–23)
CALCIUM SERPL-MCNC: 10.2 MG/DL (ref 8.8–10.2)
CHLORIDE SERPL-SCNC: 106 MMOL/L (ref 98–107)
CO2 SERPL-SCNC: 23 MMOL/L (ref 20–29)
CREAT SERPL-MCNC: 1.05 MG/DL (ref 0.8–1.3)
DIFFERENTIAL METHOD BLD: NORMAL
EOSINOPHIL # BLD: 0.37 K/UL (ref 0–0.8)
EOSINOPHIL NFR BLD: 5.4 % (ref 0.5–7.8)
ERYTHROCYTE [DISTWIDTH] IN BLOOD BY AUTOMATED COUNT: 13.9 % (ref 11.9–14.6)
EST. AVERAGE GLUCOSE BLD GHB EST-MCNC: 123 MG/DL
GLOBULIN SER CALC-MCNC: 3.1 G/DL (ref 2.3–3.5)
GLUCOSE SERPL-MCNC: 114 MG/DL (ref 70–99)
HBA1C MFR BLD: 5.9 % (ref 0–5.6)
HCT VFR BLD AUTO: 46.3 % (ref 41.1–50.3)
HGB BLD-MCNC: 16.2 G/DL (ref 13.6–17.2)
IMM GRANULOCYTES # BLD AUTO: 0.02 K/UL (ref 0–0.5)
IMM GRANULOCYTES NFR BLD AUTO: 0.3 % (ref 0–5)
INR PPP: 1
LYMPHOCYTES # BLD: 1.37 K/UL (ref 0.5–4.6)
LYMPHOCYTES NFR BLD: 19.9 % (ref 13–44)
MCH RBC QN AUTO: 31.3 PG (ref 26.1–32.9)
MCHC RBC AUTO-ENTMCNC: 35 G/DL (ref 31.4–35)
MCV RBC AUTO: 89.6 FL (ref 82–102)
MONOCYTES # BLD: 0.64 K/UL (ref 0.1–1.3)
MONOCYTES NFR BLD: 9.3 % (ref 4–12)
NEUTS SEG # BLD: 4.39 K/UL (ref 1.7–8.2)
NEUTS SEG NFR BLD: 63.5 % (ref 43–78)
NRBC # BLD: 0 K/UL (ref 0–0.2)
PLATELET # BLD AUTO: 218 K/UL (ref 150–450)
PMV BLD AUTO: 11.6 FL (ref 9.4–12.3)
POTASSIUM SERPL-SCNC: 4.8 MMOL/L (ref 3.5–5.1)
PROT SERPL-MCNC: 7.5 G/DL (ref 6.3–8.2)
PROTHROMBIN TIME: 14 SEC (ref 11.3–14.9)
RBC # BLD AUTO: 5.17 M/UL (ref 4.23–5.6)
SODIUM SERPL-SCNC: 141 MMOL/L (ref 136–145)
WBC # BLD AUTO: 6.9 K/UL (ref 4.3–11.1)

## 2025-05-21 LAB
COMMENT:: NORMAL
COTININE UR QL SCN: NEGATIVE NG/ML

## 2025-05-27 ENCOUNTER — OFFICE VISIT (OUTPATIENT)
Dept: ORTHOPEDIC SURGERY | Age: 65
End: 2025-05-27

## 2025-05-27 DIAGNOSIS — Z01.818 PRE-OP EVALUATION: Primary | ICD-10-CM
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
## 2025-05-30 ENCOUNTER — OUTSIDE SERVICES (OUTPATIENT)
Dept: ORTHOPEDIC SURGERY | Age: 65
End: 2025-05-30

## 2025-05-30 DIAGNOSIS — M17.12 PRIMARY OSTEOARTHRITIS OF LEFT KNEE: Primary | ICD-10-CM

## 2025-05-30 NOTE — PROGRESS NOTES
and brace application after the procedure. Please note that no intern, resident, or other hospital staff was available to assist during the surgery.     Seb Sullivan had undergone adductor canal block in the preoperative holding area. Seb Sullivan was brought to the operating room, positioned on the operating room table, and after appropriate identification underwent * No surgery found * anesthesia. IV antibiotics were administered per proticol. The left leg was then prepped and draped in the usual sterile manner. Timeout was taken identifying the patient, procedure ,operative side and surgeon.     Prior to incision one gram of IV transexamic acid was administered intravenously. An anterior longitudinal incision was made just medial to the tibial tubercle and extending proximal.  A subvastas capsular incision was performed. The medial capsular flap was elevated around to the midcoronal plane.  The patella was subluxed laterally and patellar fat pat partially excised. The knee was flexed and externally rotated. The articular surface revealed cartilage loss with exposed bone and bone spurs throughout all three compartments. The anterior cruciate ligament was resected.    For the procedure we used [employer] robotic imageless navigation. We then placed both our femoral and tibial check points followed by the femoral and tibial array pins. The femoral arrays pins were placed intra-incisional whereas the tibial pins were placed extra-incisional approximately 4-5cm below the tibial tubercle. Both arrays were placed and were verified to be visible to the computer sensory array. We then proceeded with point acquisition of both the femur and tibia. Finally, we captured stress views of the knee in extension and 90 degrees of flexion for our ligament balancing after medial and/or lateral osteophytes were removed.    We then adjusted our planned femoral and tibial component placement parameters to obtain acceptable

## 2025-06-02 ENCOUNTER — TELEPHONE (OUTPATIENT)
Age: 65
End: 2025-06-02

## 2025-06-02 DIAGNOSIS — Z96.652 S/P TOTAL KNEE ARTHROPLASTY, LEFT: Primary | ICD-10-CM

## 2025-06-02 NOTE — TELEPHONE ENCOUNTER
Claire dao physical therapist from [facility] called this morning and stated that she is wanting verbal orders for 8 home physical therapy appointments. She can be contacted at [phone]

## 2025-06-17 ENCOUNTER — OFFICE VISIT (OUTPATIENT)
Dept: ORTHOPEDIC SURGERY | Age: 65
End: 2025-06-17

## 2025-06-17 ENCOUNTER — EVALUATION (OUTPATIENT)
Age: 65
End: 2025-06-17
Payer: MEDICARE

## 2025-06-17 DIAGNOSIS — Z74.09 DECREASED FUNCTIONAL MOBILITY AND ENDURANCE: ICD-10-CM

## 2025-06-17 DIAGNOSIS — M25.9 WALKING DIFFICULTY DUE TO KNEE JOINT DISORDER: ICD-10-CM

## 2025-06-17 DIAGNOSIS — M25.662 DECREASED RANGE OF MOTION OF BOTH KNEES: Primary | ICD-10-CM

## 2025-06-17 DIAGNOSIS — Z96.659 STATUS POST KNEE REPLACEMENT, UNSPECIFIED LATERALITY: Primary | ICD-10-CM

## 2025-06-17 DIAGNOSIS — Z96.652 S/P TOTAL KNEE ARTHROPLASTY, LEFT: ICD-10-CM

## 2025-06-17 DIAGNOSIS — R29.898 LEG WEAKNESS, BILATERAL: ICD-10-CM

## 2025-06-17 DIAGNOSIS — Z78.9 IMPAIRED MOTOR CONTROL: ICD-10-CM

## 2025-06-17 DIAGNOSIS — R26.2 WALKING DIFFICULTY DUE TO KNEE JOINT DISORDER: ICD-10-CM

## 2025-06-17 DIAGNOSIS — M25.661 DECREASED RANGE OF MOTION OF BOTH KNEES: Primary | ICD-10-CM

## 2025-06-17 DIAGNOSIS — Z09 FOLLOW-UP EXAMINATION FOLLOWING SURGERY: ICD-10-CM

## 2025-06-17 PROCEDURE — 99024 POSTOP FOLLOW-UP VISIT: CPT | Performed by: NURSE PRACTITIONER

## 2025-06-17 PROCEDURE — 97161 PT EVAL LOW COMPLEX 20 MIN: CPT

## 2025-06-17 PROCEDURE — 97530 THERAPEUTIC ACTIVITIES: CPT

## 2025-06-17 PROCEDURE — 97110 THERAPEUTIC EXERCISES: CPT

## 2025-06-17 NOTE — PROGRESS NOTES
Patient ID:  Seb Crocker [facility]  [id number]  64 y.o.  1960    Today: June 17, 2025    CHIEF COMPLAINT:  Follow-up left total knee replacement    HISTORY:  The patient presents today for 3-week follow-up after knee replacement.  The patient is doing very well, is on aspirin for DVT prophylaxis.  The patient is working with physical therapy to regain some strength and motion.  Continues to take medication appropriately.  The patient has done a good job keeping dressing/wound clean and dry.  The patient is progressing nicely postoperatively.    PE:  Incision is examined which is well healed.  No erythema, induration or drainage. No significant fluid accumulation around the surgical site.  ROM is  0 to 105 degrees.  Overall the knee appears stable to varus/valgus stress throughout arc of motion.  Anterior drawer testing at 45 and 90º of flexion is stable.  Posterior drawer testing is stable.  Distally able to plantar and dorsiflex foot and ankle.  Sensation intact.  Limb is perfused.  No sign of DVT.    X-RAYS:    XR LT Knee 2/3 view  Views Obtained: AP, Lateral and Sunrise views of the left knee  Indication: Postop Left TKA  Findings: All hardware to be intact.  All the components appear to be well sized without any evidence of loosening.  Overall mechanical alignment appears to be within acceptable criteria. No evidence of fracture.  Patella appears to be tracking appropriately within the trochlear groove.  Impression: Normal Xray after left total knee replacement    JASON GUZMAN, APRN - CNP    ASSESSMENT:  3 Weeks S/P Left Total Knee Replacement    PLAN:  Continue activity and weight bearing as tolerated.  Continue PT/OT to focus on strengthening and stretching.  Continue to take pain medication appropriately.  The patient will continue to take aspirin for another week for a full month of DVT prophylaxis.  Will continue to work with PT/OT transitioning from home health PT to outpatient physical

## 2025-06-17 NOTE — PROGRESS NOTES
detailed below    Therapeutic Activity (68501)   Using dynamic activities to improve functional mobility and mechanics, ease with transfers 10 min STEADI    Neuromuscular Re-education (03488)  to improve postural control, balance, proprioception, motor planning and biomechanics - min     Gait Training (32680)  to improve gait mechanics, stair navigation, and safety with ambulation - min     Vasopneumatic Cryotherapy (40464) with cold   to reduce inflammation and swelling/joint effusion which will help improve ROM and manage pain - min (indirect)     Treatment time 25 min Modifier needed: No    Total time 40 min       Patient Education on the condition/pathology, involved anatomy, and exercise rationale. Patient also instructed on the performance of a HEP as noted below.      CLINICAL DECISION MAKING/ASSESSMENT     Personal Factors/co-morbidities affecting POC (1-2 Medium/3+High): age  habits/routines  past/current experiences  co-morbidities as previously noted   Problem List: (1-2 Low/ 3 Medium/ 4+ High) Localized swelling/edema  ROM limitations  Soft tissue restrictions  Strength deficits  Motor control deficits  Impaired transfers  Impaired gait  Impaired balance/proprioception  Decreased endurance/activity Tolerance  Limited work/occupational capacity  Restricted recreational participation    Clinical decision making: low complexity with therapist able to easily and confidently determine and predict expectations and future outcomes for the POC.  Prognosis: excellent   Benefits and precautions of treatment explained to patient.  Seb Sullivan is a 64 y.o. male who presents to therapy today with stable clinical presentation (low complexity) related to L TKA. Pt would benefit from skilled physical therapy services to address the deficits noted above for return to prior level of function.    PLAN OF CARE     Effective Dates/Duration: 6/17/2025 TO 8/16/2025 (60 days).    Frequency: 2x/week   Interventions may

## 2025-06-19 ENCOUNTER — TREATMENT (OUTPATIENT)
Age: 65
End: 2025-06-19

## 2025-06-19 DIAGNOSIS — M25.662 DECREASED RANGE OF MOTION OF BOTH KNEES: Primary | ICD-10-CM

## 2025-06-19 DIAGNOSIS — Z74.09 DECREASED FUNCTIONAL MOBILITY AND ENDURANCE: ICD-10-CM

## 2025-06-19 DIAGNOSIS — M25.661 DECREASED RANGE OF MOTION OF BOTH KNEES: Primary | ICD-10-CM

## 2025-06-19 DIAGNOSIS — R26.2 WALKING DIFFICULTY DUE TO KNEE JOINT DISORDER: ICD-10-CM

## 2025-06-19 DIAGNOSIS — M25.9 WALKING DIFFICULTY DUE TO KNEE JOINT DISORDER: ICD-10-CM

## 2025-06-19 DIAGNOSIS — R29.898 LEG WEAKNESS, BILATERAL: ICD-10-CM

## 2025-06-19 DIAGNOSIS — Z96.652 S/P TOTAL KNEE ARTHROPLASTY, LEFT: ICD-10-CM

## 2025-06-19 DIAGNOSIS — Z78.9 IMPAIRED MOTOR CONTROL: ICD-10-CM

## 2025-06-19 NOTE — PROGRESS NOTES
step length and weight shifting to improve household and community mobility as well as overall safety with ADLs  (36036) Manual therapy techniques to improve joint and/or soft tissue mobility, ROM, and function as well as helping to decrease pain/spasms and swelling  (38628) Neuromuscular reeducation addressing impaired balance, coordination, kinesthetic sense, posture and proprioception  (20560/20561) Dry needling for the management of neuromusculoskeletal pain and movement impairment  Modalities prn to address pain, spasms, and swelling: (58086/) Electrical stimulation- unattended  (32718) Vasopneumatic compression  (63995) Hot/cold pack    The referring medical provider has reviewed and approved this evaluation and plan of care as noted by the electronic signature attached to note.    GOALS     Goals:  Short term goals to be met by 7/15/2025  (4 weeks):  Pt will demonstrate good recall of HEP requiring minimal verbal cuing for proper form and technique.  Increase bilateral knee AROM to 120 degrees flexion, for improved mechanics in functional and work tasks.  Pt will increase strength globally to >/= 4+/5 for improved LQ function in work tasks.  Pt will perform STEADI within the following, demonstrating improved stability, strength, and endurance:  TUG: <8s  30s chair stand: >/= 12 repetitions  4 stage balance: 4/4 positions    Long term goals to be met by 8/16/2025 (60 days):  Demonstrate pain free AROM that is within 5° of the uninvolved knee and does not limit functional activities due to stiffness.   Pt will increase LE strength to at least 5/5 with MMT for improved performance of functional activities..   Pt will perform 10 repetitions of modified SL squat bilaterally (heel down), demonstrating appropriate control and strength for return to prior level of function  Pt will return to full work duty.  Improve LEFS to 65/80 demonstrating minimal functional restrictions with ADLs, work and athletic

## 2025-06-23 ENCOUNTER — TREATMENT (OUTPATIENT)
Age: 65
End: 2025-06-23
Payer: MEDICARE

## 2025-06-23 DIAGNOSIS — M25.662 DECREASED RANGE OF MOTION OF BOTH KNEES: Primary | ICD-10-CM

## 2025-06-23 DIAGNOSIS — R29.898 LEG WEAKNESS, BILATERAL: ICD-10-CM

## 2025-06-23 DIAGNOSIS — M25.9 WALKING DIFFICULTY DUE TO KNEE JOINT DISORDER: ICD-10-CM

## 2025-06-23 DIAGNOSIS — Z78.9 IMPAIRED MOTOR CONTROL: ICD-10-CM

## 2025-06-23 DIAGNOSIS — G89.18 POSTOPERATIVE PAIN: ICD-10-CM

## 2025-06-23 DIAGNOSIS — R26.2 WALKING DIFFICULTY DUE TO KNEE JOINT DISORDER: ICD-10-CM

## 2025-06-23 DIAGNOSIS — Z74.09 DECREASED FUNCTIONAL MOBILITY AND ENDURANCE: ICD-10-CM

## 2025-06-23 DIAGNOSIS — M25.661 DECREASED RANGE OF MOTION OF BOTH KNEES: Primary | ICD-10-CM

## 2025-06-23 PROCEDURE — 97530 THERAPEUTIC ACTIVITIES: CPT | Performed by: PHYSICAL THERAPIST

## 2025-06-23 PROCEDURE — 97140 MANUAL THERAPY 1/> REGIONS: CPT | Performed by: PHYSICAL THERAPIST

## 2025-06-23 PROCEDURE — 97016 VASOPNEUMATIC DEVICE THERAPY: CPT | Performed by: PHYSICAL THERAPIST

## 2025-06-23 PROCEDURE — 97110 THERAPEUTIC EXERCISES: CPT | Performed by: PHYSICAL THERAPIST

## 2025-06-23 RX ORDER — ZOLPIDEM TARTRATE 5 MG/1
5 TABLET ORAL NIGHTLY PRN
Qty: 30 TABLET | Refills: 0 | Status: SHIPPED | OUTPATIENT
Start: 2025-06-23 | End: 2025-07-23

## 2025-06-23 NOTE — PROGRESS NOTES
transfers  [x]Squat progression -   [x]Sit to stand - 22\": 2/10  [x]L/R front step ups: 6\"/2/60 sec  [x]Vasopneumatic Cryotherapy (00425) with cold   to reduce inflammation and swelling/joint effusion which will help improve ROM and manage pain    ASSESSMENT     Patient extremely stressed re lack of sleep. Encouraged patient to contact PCP.  Otherwise tolerated PT well.  Continue PT to improve ROM, pain and swelling. Progress TE and TA to improve mobility.  PLAN     Assess response to this session - sleep, pain, swelling, ROM.  Progress TE and TA.  Continue vaso to manage pain and swelling.    GOALS     Goals:  Short term goals to be met by 7/15/2025  (4 weeks):  Pt will demonstrate good recall of HEP requiring minimal verbal cuing for proper form and technique.  Increase bilateral knee AROM to 120 degrees flexion, for improved mechanics in functional and work tasks.  Pt will increase strength globally to >/= 4+/5 for improved LQ function in work tasks.  Pt will perform STEADI within the following, demonstrating improved stability, strength, and endurance:  TUG: <8s  30s chair stand: >/= 12 repetitions  4 stage balance: 4/4 positions    Long term goals to be met by 8/16/2025 (60 days):  Demonstrate pain free AROM that is within 5° of the uninvolved knee and does not limit functional activities due to stiffness.   Pt will increase LE strength to at least 5/5 with MMT for improved performance of functional activities..   Pt will perform 10 repetitions of modified SL squat bilaterally (heel down), demonstrating appropriate control and strength for return to prior level of function  Pt will return to full work duty.  Improve LEFS to 65/80 demonstrating minimal functional restrictions with ADLs, work and athletic activities.     [employer]  Access Code: [email or web address]  URL: [email or web address]  Date: 06/17/2025  Prepared by: Mulugeta Gorman    Exercises  - Knee flexion/extension stretch  - 4 x daily - 2

## 2025-06-23 NOTE — TELEPHONE ENCOUNTER
PATIENTWALKED IN STATING HE HAS HAD INSOMNIA FOR 4 DAYS NOW, HE HAS HAD A PRIOR RX FOR:    zolpidem (AMBIEN) 5 MG tablet     [id number]  [place] [address] [phone] -  [phone]     PLEASE ADVISE.

## 2025-06-26 ENCOUNTER — TREATMENT (OUTPATIENT)
Age: 65
End: 2025-06-26

## 2025-06-26 DIAGNOSIS — M25.9 WALKING DIFFICULTY DUE TO KNEE JOINT DISORDER: ICD-10-CM

## 2025-06-26 DIAGNOSIS — Z78.9 IMPAIRED MOTOR CONTROL: ICD-10-CM

## 2025-06-26 DIAGNOSIS — M25.662 DECREASED RANGE OF MOTION OF BOTH KNEES: Primary | ICD-10-CM

## 2025-06-26 DIAGNOSIS — R29.898 LEG WEAKNESS, BILATERAL: ICD-10-CM

## 2025-06-26 DIAGNOSIS — R26.2 WALKING DIFFICULTY DUE TO KNEE JOINT DISORDER: ICD-10-CM

## 2025-06-26 DIAGNOSIS — M25.661 DECREASED RANGE OF MOTION OF BOTH KNEES: Primary | ICD-10-CM

## 2025-06-26 DIAGNOSIS — Z74.09 DECREASED FUNCTIONAL MOBILITY AND ENDURANCE: ICD-10-CM
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
## 2025-06-30 ENCOUNTER — TREATMENT (OUTPATIENT)
Age: 65
End: 2025-06-30
Payer: MEDICARE

## 2025-06-30 DIAGNOSIS — Z78.9 IMPAIRED MOTOR CONTROL: ICD-10-CM

## 2025-06-30 DIAGNOSIS — R29.898 LEG WEAKNESS, BILATERAL: ICD-10-CM

## 2025-06-30 DIAGNOSIS — Z74.09 DECREASED FUNCTIONAL MOBILITY AND ENDURANCE: ICD-10-CM

## 2025-06-30 DIAGNOSIS — R26.2 WALKING DIFFICULTY DUE TO KNEE JOINT DISORDER: ICD-10-CM

## 2025-06-30 DIAGNOSIS — M25.9 WALKING DIFFICULTY DUE TO KNEE JOINT DISORDER: ICD-10-CM

## 2025-06-30 DIAGNOSIS — M25.662 DECREASED RANGE OF MOTION OF BOTH KNEES: Primary | ICD-10-CM

## 2025-06-30 DIAGNOSIS — M25.661 DECREASED RANGE OF MOTION OF BOTH KNEES: Primary | ICD-10-CM

## 2025-06-30 PROCEDURE — 97110 THERAPEUTIC EXERCISES: CPT | Performed by: PHYSICAL THERAPIST
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
## 2025-07-03 ENCOUNTER — TREATMENT (OUTPATIENT)
Age: 65
End: 2025-07-03

## 2025-07-03 DIAGNOSIS — M25.9 WALKING DIFFICULTY DUE TO KNEE JOINT DISORDER: ICD-10-CM

## 2025-07-03 DIAGNOSIS — M25.469 KNEE SWELLING: ICD-10-CM

## 2025-07-03 DIAGNOSIS — M25.662 DECREASED RANGE OF MOTION OF LEFT KNEE: ICD-10-CM

## 2025-07-03 DIAGNOSIS — M25.562 LEFT KNEE PAIN, UNSPECIFIED CHRONICITY: ICD-10-CM

## 2025-07-03 DIAGNOSIS — Z74.09 DECREASED FUNCTIONAL MOBILITY AND ENDURANCE: Primary | ICD-10-CM

## 2025-07-03 DIAGNOSIS — R29.898 IMPAIRED STRENGTH OF LOWER EXTREMITY: ICD-10-CM

## 2025-07-03 DIAGNOSIS — Z78.9 IMPAIRED MOTOR CONTROL: ICD-10-CM

## 2025-07-03 DIAGNOSIS — R26.2 WALKING DIFFICULTY DUE TO KNEE JOINT DISORDER: ICD-10-CM
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
## 2025-07-07 ENCOUNTER — TREATMENT (OUTPATIENT)
Age: 65
End: 2025-07-07
Payer: MEDICARE

## 2025-07-07 DIAGNOSIS — Z74.09 DECREASED FUNCTIONAL MOBILITY AND ENDURANCE: Primary | ICD-10-CM

## 2025-07-07 DIAGNOSIS — M25.662 DECREASED RANGE OF MOTION OF LEFT KNEE: ICD-10-CM

## 2025-07-07 DIAGNOSIS — M25.562 LEFT KNEE PAIN, UNSPECIFIED CHRONICITY: ICD-10-CM

## 2025-07-07 DIAGNOSIS — M25.469 KNEE SWELLING: ICD-10-CM

## 2025-07-07 DIAGNOSIS — Z78.9 IMPAIRED MOTOR CONTROL: ICD-10-CM

## 2025-07-07 DIAGNOSIS — R29.898 IMPAIRED STRENGTH OF LOWER EXTREMITY: ICD-10-CM

## 2025-07-07 DIAGNOSIS — R26.2 WALKING DIFFICULTY DUE TO KNEE JOINT DISORDER: ICD-10-CM

## 2025-07-07 DIAGNOSIS — M25.9 WALKING DIFFICULTY DUE TO KNEE JOINT DISORDER: ICD-10-CM

## 2025-07-07 PROCEDURE — 97530 THERAPEUTIC ACTIVITIES: CPT | Performed by: PHYSICAL THERAPIST

## 2025-07-07 PROCEDURE — 97016 VASOPNEUMATIC DEVICE THERAPY: CPT | Performed by: PHYSICAL THERAPIST

## 2025-07-07 PROCEDURE — 97110 THERAPEUTIC EXERCISES: CPT | Performed by: PHYSICAL THERAPIST

## 2025-07-07 NOTE — PROGRESS NOTES
length, restore joint accessory motion to improve PROM, AROM for the restoration of function.  Time includes palpation for assessment of soft tissue structures as well as treatment.    STM L knee, distal quads and proximal calf  Scar mobilizations  A/P tibial glides - - -   07876 - VASOPNEUMATIC DEVICE w TEMPERATURE SET @ 34°: Game Ready to the LEFT KNEE w the LE elevated for management of pain and swelling which will assist in restoring ROM and mm function.  1  15         Totals   Charge Capture 4 40 minutes 60 minutes     ASSESSMENT     Sleep quality and duration improving although it remains variable night to night.  Today's session focused on improving ROM, mm function and mobility.    PLAN     Assess response to this session - sleep, pain, swelling, ROM.  Progress TE and TA.  Continue vaso to manage pain and swelling.    GOALS     Goals:  Short term goals to be met by 7/15/2025  (4 weeks):  Pt will demonstrate good recall of HEP requiring minimal verbal cuing for proper form and technique.  Increase bilateral knee AROM to 120 degrees flexion, for improved mechanics in functional and work tasks.  Pt will increase strength globally to >/= 4+/5 for improved LQ function in work tasks.  Pt will perform STEADI within the following, demonstrating improved stability, strength, and endurance:  TUG: <8s  30s chair stand: >/= 12 repetitions  4 stage balance: 4/4 positions    Long term goals to be met by 8/16/2025 (60 days):  Demonstrate pain free AROM that is within 5° of the uninvolved knee and does not limit functional activities due to stiffness.   Pt will increase LE strength to at least 5/5 with MMT for improved performance of functional activities..   Pt will perform 10 repetitions of modified SL squat bilaterally (heel down), demonstrating appropriate control and strength for return to prior level of function  Pt will return to full work duty.  Improve LEFS to 65/80 demonstrating minimal functional restrictions

## 2025-07-10 ENCOUNTER — TREATMENT (OUTPATIENT)
Age: 65
End: 2025-07-10

## 2025-07-10 DIAGNOSIS — Z74.09 DECREASED FUNCTIONAL MOBILITY AND ENDURANCE: ICD-10-CM

## 2025-07-10 DIAGNOSIS — R29.898 IMPAIRED STRENGTH OF LOWER EXTREMITY: Primary | ICD-10-CM

## 2025-07-10 DIAGNOSIS — M25.469 KNEE SWELLING: ICD-10-CM

## 2025-07-10 DIAGNOSIS — M25.562 LEFT KNEE PAIN, UNSPECIFIED CHRONICITY: ICD-10-CM

## 2025-07-10 DIAGNOSIS — M25.662 DECREASED RANGE OF MOTION OF LEFT KNEE: ICD-10-CM

## 2025-07-10 DIAGNOSIS — Z78.9 IMPAIRED MOTOR CONTROL: ICD-10-CM
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
## 2025-07-14 ENCOUNTER — TREATMENT (OUTPATIENT)
Age: 65
End: 2025-07-14
Payer: MEDICARE

## 2025-07-14 DIAGNOSIS — M25.661 DECREASED RANGE OF MOTION OF BOTH KNEES: ICD-10-CM

## 2025-07-14 DIAGNOSIS — M25.469 KNEE SWELLING: ICD-10-CM

## 2025-07-14 DIAGNOSIS — M25.9 WALKING DIFFICULTY DUE TO KNEE JOINT DISORDER: ICD-10-CM

## 2025-07-14 DIAGNOSIS — M25.662 DECREASED RANGE OF MOTION OF LEFT KNEE: ICD-10-CM

## 2025-07-14 DIAGNOSIS — Z74.09 DECREASED FUNCTIONAL MOBILITY AND ENDURANCE: ICD-10-CM

## 2025-07-14 DIAGNOSIS — R26.2 WALKING DIFFICULTY DUE TO KNEE JOINT DISORDER: ICD-10-CM

## 2025-07-14 DIAGNOSIS — R29.898 IMPAIRED STRENGTH OF LOWER EXTREMITY: Primary | ICD-10-CM

## 2025-07-14 DIAGNOSIS — M25.662 DECREASED RANGE OF MOTION OF BOTH KNEES: ICD-10-CM

## 2025-07-14 DIAGNOSIS — M25.562 LEFT KNEE PAIN, UNSPECIFIED CHRONICITY: ICD-10-CM

## 2025-07-14 DIAGNOSIS — Z78.9 IMPAIRED MOTOR CONTROL: ICD-10-CM

## 2025-07-14 PROCEDURE — 97530 THERAPEUTIC ACTIVITIES: CPT

## 2025-07-14 PROCEDURE — 97110 THERAPEUTIC EXERCISES: CPT

## 2025-07-14 PROCEDURE — 97112 NEUROMUSCULAR REEDUCATION: CPT
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
## 2025-07-17 ENCOUNTER — TREATMENT (OUTPATIENT)
Age: 65
End: 2025-07-17

## 2025-07-17 DIAGNOSIS — M25.9 WALKING DIFFICULTY DUE TO KNEE JOINT DISORDER: Primary | ICD-10-CM

## 2025-07-17 DIAGNOSIS — Z74.09 DECREASED FUNCTIONAL MOBILITY AND ENDURANCE: ICD-10-CM

## 2025-07-17 DIAGNOSIS — Z78.9 IMPAIRED MOTOR CONTROL: ICD-10-CM

## 2025-07-17 DIAGNOSIS — M25.469 KNEE SWELLING: ICD-10-CM

## 2025-07-17 DIAGNOSIS — R29.898 IMPAIRED STRENGTH OF LOWER EXTREMITY: ICD-10-CM

## 2025-07-17 DIAGNOSIS — M25.562 LEFT KNEE PAIN, UNSPECIFIED CHRONICITY: ICD-10-CM

## 2025-07-17 DIAGNOSIS — M25.662 DECREASED RANGE OF MOTION OF LEFT KNEE: ICD-10-CM

## 2025-07-17 DIAGNOSIS — R26.2 WALKING DIFFICULTY DUE TO KNEE JOINT DISORDER: Primary | ICD-10-CM
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
## 2025-07-28 ENCOUNTER — TREATMENT (OUTPATIENT)
Age: 65
End: 2025-07-28
Payer: MEDICARE

## 2025-07-28 DIAGNOSIS — M25.9 WALKING DIFFICULTY DUE TO KNEE JOINT DISORDER: Primary | ICD-10-CM

## 2025-07-28 DIAGNOSIS — Z74.09 DECREASED FUNCTIONAL MOBILITY AND ENDURANCE: ICD-10-CM

## 2025-07-28 DIAGNOSIS — M25.562 LEFT KNEE PAIN, UNSPECIFIED CHRONICITY: ICD-10-CM

## 2025-07-28 DIAGNOSIS — M25.662 DECREASED RANGE OF MOTION OF LEFT KNEE: ICD-10-CM

## 2025-07-28 DIAGNOSIS — R29.898 IMPAIRED STRENGTH OF LOWER EXTREMITY: ICD-10-CM

## 2025-07-28 DIAGNOSIS — M25.469 KNEE SWELLING: ICD-10-CM

## 2025-07-28 DIAGNOSIS — R26.2 WALKING DIFFICULTY DUE TO KNEE JOINT DISORDER: Primary | ICD-10-CM

## 2025-07-28 PROCEDURE — 97110 THERAPEUTIC EXERCISES: CPT | Performed by: PHYSICAL THERAPIST

## 2025-07-28 PROCEDURE — 97530 THERAPEUTIC ACTIVITIES: CPT | Performed by: PHYSICAL THERAPIST

## 2025-07-28 NOTE — PROGRESS NOTES
[facility] PT [facility]  [address] [place] [id number]  Dept: [phone]      Physical Therapy Daily Note     Referring MD: Abraham Tate MD  Diagnosis:     ICD-10-CM    1. Walking difficulty due to knee joint disorder  M25.9     R26.2       2. Decreased functional mobility and endurance  Z74.09       3. Impaired strength of lower extremity  R29.898       4. Knee swelling  M25.469       5. Decreased range of motion of left knee  M25.662       6. Left knee pain, unspecified chronicity  M25.562         Surgery: L TKA Date: 5/30/25  Therapy precautions:Diabetes/neuropathy- sensory precautions  Co-morbidities affecting plan of care: HTN, CAD, RTKA    Episode visit count:  11     PERTINENT MEDICAL HISTORY     Past medical and surgical history: See PT IE  Medications: reviewed in chart   Allergies: Not contrib.    SUBJECTIVE     Believe I will be ready to finish up on Thurs.  Overall the knee is doing well. Last week I had a stomach bug that is why I wasn't here.    Patient Stated Goals: restore function and return to golfing and work    OBJECTIVE     LEFS FUNCTIONAL QUESTIONNAIRE 6/17/25 7/14/25   RAW SCORE 52/80 53/80   % FUNCTION 65% 66%     ROM Measures:   Left knee AROM 7/3/25 7/7/25 7/14/25 7/17/25   PRE 0 - 125 0 - 122 L: Lacking 2-114  R: 115' knee flexion 0 - 118   POST         STEADI assessment  Test  7/14/25   TUG 8 seconds   30s Chair Stand 11 repetitions   4 Stage Balance Side by side: 10 seconds  Semi-tandem Bilateral fwd: 10 seconds  Tandem Bilateral fwd: 10 seconds  Single leg Bilateral: 10 seconds     Today's treatment consisted of :    Payor: Payor: MEDICARE /  /  /  Billing pattern: Government- total time    Authorized Visits: 11  Episode start date: 6/17/2025   11      CPT /   Modifier needed: No  REFERRAL UNITS Treatment Time (1:1)  Total Time    (in office)    [address] - THERAPEUTIC EXERCISE:  To address pain and deficits related to ROM and mm function - force production,

## 2025-07-31 ENCOUNTER — TREATMENT (OUTPATIENT)
Age: 65
End: 2025-07-31

## 2025-07-31 DIAGNOSIS — R29.898 IMPAIRED STRENGTH OF LOWER EXTREMITY: ICD-10-CM

## 2025-07-31 DIAGNOSIS — Z74.09 DECREASED FUNCTIONAL MOBILITY AND ENDURANCE: ICD-10-CM

## 2025-07-31 DIAGNOSIS — M25.562 LEFT KNEE PAIN, UNSPECIFIED CHRONICITY: ICD-10-CM

## 2025-07-31 DIAGNOSIS — R26.2 WALKING DIFFICULTY DUE TO KNEE JOINT DISORDER: Primary | ICD-10-CM

## 2025-07-31 DIAGNOSIS — M25.662 DECREASED RANGE OF MOTION OF LEFT KNEE: ICD-10-CM

## 2025-07-31 DIAGNOSIS — M25.469 KNEE SWELLING: ICD-10-CM

## 2025-07-31 DIAGNOSIS — M25.9 WALKING DIFFICULTY DUE TO KNEE JOINT DISORDER: Primary | ICD-10-CM

## 2025-07-31 NOTE — PROGRESS NOTES
[facility] PT [facility]  [address] [place] [address]  Dept: [phone]      Physical Therapy Daily Note     Referring MD: Abraham Tate MD  Diagnosis:     ICD-10-CM    1. Walking difficulty due to knee joint disorder  M25.9     R26.2       2. Decreased functional mobility and endurance  Z74.09       3. Impaired strength of lower extremity  R29.898       4. Knee swelling  M25.469       5. Decreased range of motion of left knee  M25.662       6. Left knee pain, unspecified chronicity  M25.562         Surgery: L TKA Date: 5/30/25  Therapy precautions:Diabetes/neuropathy- sensory precautions  Co-morbidities affecting plan of care: HTN, CAD, RTKA    Episode visit count:  12     PERTINENT MEDICAL HISTORY     Past medical and surgical history: See PT IE  Medications: reviewed in chart   Allergies: Not contrib.    SUBJECTIVE     Having no problems w work, I could work 10 hours w/o an issue. Icing it off and on.   Overall would say I am about 85% of my 'normal'.  I have not tried to play golf yet.    Patient Stated Goals: restore function and return to golfing and work    OBJECTIVE     LEFS FUNCTIONAL QUESTIONNAIRE 6/17/25 7/14/25 7/31/25   RAW SCORE 52/80 53/80 56/80   % FUNCTION 65% 66% 70%     ROM Measures:   Left knee AROM 7/3/25 7/7/25 7/14/25 7/17/25 7/31/25   PRE 0 - 125 0 - 122 L: Lacking 2-114  R: 115' knee flexion 0 - 118 0 - 125   POST          Today's treatment consisted of :  Payor: Payor: MEDICARE /  /  /  Billing pattern: Government- total time    Authorized Visits: 12  Episode start date: 6/17/2025   12      CPT /   Modifier needed: No  REFERRAL UNITS Treatment Time (1:1)  Total Time    (in office)    [address] - THERAPEUTIC EXERCISE:  To address pain and deficits related to ROM and mm function - force production, endurance, neuromuscular coordination for completing ADLs.   [] Assessment of progress towards strength/ROM goals and education on POC  [x]Assess knee AROM  [x]Recumbent

## 2025-08-04 ENCOUNTER — OFFICE VISIT (OUTPATIENT)
Dept: INTERNAL MEDICINE CLINIC | Facility: CLINIC | Age: 65
End: 2025-08-04
Payer: MEDICARE

## 2025-08-04 VITALS
DIASTOLIC BLOOD PRESSURE: 84 MMHG | SYSTOLIC BLOOD PRESSURE: 136 MMHG | BODY MASS INDEX: 30.8 KG/M2 | HEART RATE: 55 BPM | OXYGEN SATURATION: 98 % | WEIGHT: 240 LBS | HEIGHT: 74 IN

## 2025-08-04 DIAGNOSIS — I10 ESSENTIAL HYPERTENSION: ICD-10-CM

## 2025-08-04 DIAGNOSIS — F51.01 PRIMARY INSOMNIA: Primary | ICD-10-CM

## 2025-08-04 DIAGNOSIS — Z13.6 SCREENING FOR AAA (ABDOMINAL AORTIC ANEURYSM): ICD-10-CM

## 2025-08-04 DIAGNOSIS — Z87.891 PERSONAL HISTORY OF TOBACCO USE, PRESENTING HAZARDS TO HEALTH: ICD-10-CM

## 2025-08-04 DIAGNOSIS — E78.2 MIXED HYPERLIPIDEMIA: ICD-10-CM

## 2025-08-04 PROCEDURE — 1123F ACP DISCUSS/DSCN MKR DOCD: CPT

## 2025-08-04 PROCEDURE — 99214 OFFICE O/P EST MOD 30 MIN: CPT

## 2025-08-04 PROCEDURE — 3079F DIAST BP 80-89 MM HG: CPT

## 2025-08-04 PROCEDURE — 1159F MED LIST DOCD IN RCRD: CPT

## 2025-08-04 PROCEDURE — 1036F TOBACCO NON-USER: CPT

## 2025-08-04 PROCEDURE — 1160F RVW MEDS BY RX/DR IN RCRD: CPT

## 2025-08-04 PROCEDURE — G8417 CALC BMI ABV UP PARAM F/U: HCPCS

## 2025-08-04 PROCEDURE — 3075F SYST BP GE 130 - 139MM HG: CPT

## 2025-08-04 PROCEDURE — 3017F COLORECTAL CA SCREEN DOC REV: CPT

## 2025-08-04 PROCEDURE — G8427 DOCREV CUR MEDS BY ELIG CLIN: HCPCS

## 2025-08-04 RX ORDER — ATENOLOL 100 MG/1
100 TABLET ORAL DAILY
Qty: 90 TABLET | Refills: 1 | Status: SHIPPED | OUTPATIENT
Start: 2025-08-04

## 2025-08-04 RX ORDER — ROSUVASTATIN CALCIUM 20 MG/1
20 TABLET, COATED ORAL DAILY
Qty: 90 TABLET | Refills: 1 | Status: SHIPPED | OUTPATIENT
Start: 2025-08-04

## 2025-08-04 RX ORDER — AMLODIPINE BESYLATE 5 MG/1
5 TABLET ORAL DAILY
Qty: 100 TABLET | Refills: 1 | Status: SHIPPED | OUTPATIENT
Start: 2025-08-04

## 2025-08-04 RX ORDER — ZOLPIDEM TARTRATE 5 MG/1
5 TABLET ORAL NIGHTLY PRN
Qty: 30 TABLET | Refills: 5 | Status: SHIPPED | OUTPATIENT
Start: 2025-08-04 | End: 2026-01-31

## 2025-08-04 RX ORDER — ZOLPIDEM TARTRATE 5 MG/1
5 TABLET ORAL NIGHTLY PRN
Qty: 30 TABLET | Refills: 2 | Status: SHIPPED | OUTPATIENT
Start: 2025-08-04 | End: 2025-08-04

## 2025-08-04 ASSESSMENT — PATIENT HEALTH QUESTIONNAIRE - PHQ9
SUM OF ALL RESPONSES TO PHQ QUESTIONS 1-9: 0
2. FEELING DOWN, DEPRESSED OR HOPELESS: NOT AT ALL
SUM OF ALL RESPONSES TO PHQ QUESTIONS 1-9: 0
1. LITTLE INTEREST OR PLEASURE IN DOING THINGS: NOT AT ALL

## 2025-08-04 ASSESSMENT — ENCOUNTER SYMPTOMS
SHORTNESS OF BREATH: 0
COUGH: 0

## 2025-08-04 ASSESSMENT — VISUAL ACUITY: OU: 1

## 2025-08-15 RX ORDER — MELOXICAM 15 MG/1
15 TABLET ORAL DAILY
Qty: 30 TABLET | Refills: 2 | OUTPATIENT
Start: 2025-08-15

## (undated) DEVICE — MEDI-VAC YANKAUER SUCTION HANDLE W/BULBOUS TIP: Brand: CARDINAL HEALTH

## (undated) DEVICE — SUTURE STRATAFIX SPRL SZ 1 L5IN ABSRB VLT CT-1 L36MM 1/2 SXPD2B401

## (undated) DEVICE — TRAY PREP DRY W/ PREM GLV 2 APPL 6 SPNG 2 UNDPD 1 OVERWRAP

## (undated) DEVICE — APPLICATOR COT-TIP WOOD 6IN -- NON STRL

## (undated) DEVICE — Z DISCONTINUED USE 2423037 APPLICATOR MEDICATED 3 CC CHLORHEXIDINE GLUC 2% CHLORAPREP

## (undated) DEVICE — TROCAR: Brand: KII FIOS FIRST ENTRY

## (undated) DEVICE — BIPOLAR SEALER 23-313-1 AQM 9.5XL: Brand: AQUAMANTYS ®

## (undated) DEVICE — SUTURE VCRL SZ 3-0 L27IN ABSRB UD L26MM SH 1/2 CIR J416H

## (undated) DEVICE — DRAPE,TOP,102X53,STERILE: Brand: MEDLINE

## (undated) DEVICE — MONOPOLAR CAUTERY CORD

## (undated) DEVICE — MASTISOL ADHESIVE LIQ 2/3ML

## (undated) DEVICE — PACK PROCEDURE SURG TOT KNEE

## (undated) DEVICE — 3000CC GUARDIAN II: Brand: GUARDIAN

## (undated) DEVICE — SYR 10ML LUER LOK 1/5ML GRAD --

## (undated) DEVICE — AMD ANTIMICROBIAL GAUZE SPONGES,12 PLY USP TYPE VII, 0.2% POLYHEXAMETHYLENE BIGUANIDE HCI (PHMB): Brand: CURITY

## (undated) DEVICE — NEEDLE SPNL 22GA L3.5IN BLK HUB S STL REG WALL FIT STYL W/

## (undated) DEVICE — SUTURE STRATAFIX SZ 0 L12IN ABSRB VIO CT-1 L36MM SLV TAPR PT SXPP1A433

## (undated) DEVICE — BLADELESS OBTURATOR: Brand: WECK VISTA

## (undated) DEVICE — CONTAINER,SPECIMEN,O.R.STRL,4.5OZ: Brand: MEDLINE

## (undated) DEVICE — VISUALIZATION SYSTEM: Brand: CLEARIFY

## (undated) DEVICE — COVER,LIGHT HANDLE,FLX,2/PK: Brand: MEDLINE INDUSTRIES, INC.

## (undated) DEVICE — STERILE PRESSURE PROTECTOR PAD® FOR DE MAYO UNIVERSAL DISTRACTOR® (10/CASE): Brand: DE MAYO UNIVERSAL DISTRACTOR®

## (undated) DEVICE — BIPOLAR CAUTERY CORD

## (undated) DEVICE — AIRSEAL BIFURCATED SMOKE EVAC FILTERED TUBE SET: Brand: AIRSEAL

## (undated) DEVICE — 2000CC GUARDIAN II: Brand: GUARDIAN

## (undated) DEVICE — SOLUTION IRRIG 3000ML 0.9% SOD CHL FLX CONT 0797208] ICU MEDICAL INC]

## (undated) DEVICE — T4 HOOD

## (undated) DEVICE — BUTTON SWITCH PENCIL BLADE ELECTRODE, HOLSTER: Brand: EDGE

## (undated) DEVICE — ELECTRO LUBE IS A SINGLE PATIENT USE DEVICE THAT IS INTENDED TO BE USED ON ELECTROSURGICAL ELECTRODES TO REDUCE STICKING.: Brand: KEY SURGICAL ELECTRO LUBE

## (undated) DEVICE — COLUMN DRAPE

## (undated) DEVICE — 40585 XL ADVANCED TRENDELENBURG POSITIONING KIT: Brand: 40585 XL ADVANCED TRENDELENBURG POSITIONING KIT

## (undated) DEVICE — TRAY CATH 16F DRN BG LTX -- CONVERT TO ITEM 363158

## (undated) DEVICE — NEEDLE HYPO 21GA L1.5IN INTRAMUSCULAR S STL LATCH BVL UP

## (undated) DEVICE — SYR LR LCK 1ML GRAD NSAF 30ML --

## (undated) DEVICE — CANNULA SEAL

## (undated) DEVICE — INSUFFLATION NEEDLE: Brand: SURGINEEDLE

## (undated) DEVICE — Z DISCONTINUED PER MEDLINE USE 2741944 DRESSING AQUACEL 12 IN SURG W9XL30CM SIL CVR WTRPRF VIR BACT BARR ANTIMIC

## (undated) DEVICE — SUTURE FIBERWIRE SZ 2 W/ TAPERED NEEDLE BLUE L38IN NONABSORB BLU L26.5MM 1/2 CIRCLE AR7200

## (undated) DEVICE — FAN SPRAY KIT: Brand: PULSAVAC®

## (undated) DEVICE — STRIP,CLOSURE,WOUND,MEDI-STRIP,1/2X4: Brand: MEDLINE

## (undated) DEVICE — ARM DRAPE

## (undated) DEVICE — 3M™ TEGADERM™ TRANSPARENT FILM DRESSING FRAME STYLE, 1624W, 2-3/8 IN X 2-3/4 IN (6 CM X 7 CM), 100/CT 4CT/CASE: Brand: 3M™ TEGADERM™

## (undated) DEVICE — ROBOTIC HYSTERECTOMY: Brand: MEDLINE INDUSTRIES, INC.

## (undated) DEVICE — SUTURE MCRYL SZ 4-0 L27IN ABSRB UD L19MM PS-2 1/2 CIR PRIM Y426H

## (undated) DEVICE — (D)PREP SKN CHLRAPRP APPL 26ML -- CONVERT TO ITEM 371833

## (undated) DEVICE — GARMENT,MEDLINE,DVT,INT,CALF,MED, GEN2: Brand: MEDLINE

## (undated) DEVICE — REM POLYHESIVE ADULT PATIENT RETURN ELECTRODE: Brand: VALLEYLAB

## (undated) DEVICE — SUTURE V-LOC 180 SZ 2-0 L9IN ABSRB GRN L27MM GS-22 1/2 CIR VLOCL2145

## (undated) DEVICE — SOLUTION IV 1000ML 0.9% SOD CHL

## (undated) DEVICE — GOWN,REINFORCED,POLY,AURORA,XXLARGE,STR: Brand: MEDLINE

## (undated) DEVICE — SUTURE MCRYL SZ 2-0 L27IN ABSRB UD CP-1 1 L36MM 1/2 CIR REV Y266H